# Patient Record
Sex: MALE | Race: BLACK OR AFRICAN AMERICAN | NOT HISPANIC OR LATINO | URBAN - METROPOLITAN AREA
[De-identification: names, ages, dates, MRNs, and addresses within clinical notes are randomized per-mention and may not be internally consistent; named-entity substitution may affect disease eponyms.]

---

## 2017-03-08 PROBLEM — Z00.00 ENCOUNTER FOR PREVENTIVE HEALTH EXAMINATION: Status: ACTIVE | Noted: 2017-03-08

## 2017-11-11 ENCOUNTER — INPATIENT (INPATIENT)
Facility: HOSPITAL | Age: 67
LOS: 0 days | Discharge: HOME | DRG: 315 | End: 2017-11-12
Admitting: HOSPITALIST
Payer: COMMERCIAL

## 2017-11-11 VITALS
OXYGEN SATURATION: 98 % | SYSTOLIC BLOOD PRESSURE: 113 MMHG | DIASTOLIC BLOOD PRESSURE: 75 MMHG | WEIGHT: 216.93 LBS | RESPIRATION RATE: 20 BRPM | TEMPERATURE: 98 F | HEIGHT: 75 IN | HEART RATE: 87 BPM

## 2017-11-11 PROCEDURE — 93010 ELECTROCARDIOGRAM REPORT: CPT

## 2017-11-12 ENCOUNTER — TRANSCRIPTION ENCOUNTER (OUTPATIENT)
Age: 67
End: 2017-11-12

## 2017-11-12 VITALS
DIASTOLIC BLOOD PRESSURE: 62 MMHG | RESPIRATION RATE: 19 BRPM | OXYGEN SATURATION: 97 % | TEMPERATURE: 98 F | SYSTOLIC BLOOD PRESSURE: 136 MMHG | HEART RATE: 65 BPM

## 2017-11-12 DIAGNOSIS — Z29.9 ENCOUNTER FOR PROPHYLACTIC MEASURES, UNSPECIFIED: ICD-10-CM

## 2017-11-12 DIAGNOSIS — Z45.02 ENCOUNTER FOR ADJUSTMENT AND MANAGEMENT OF AUTOMATIC IMPLANTABLE CARDIAC DEFIBRILLATOR: ICD-10-CM

## 2017-11-12 DIAGNOSIS — Z95.810 PRESENCE OF AUTOMATIC (IMPLANTABLE) CARDIAC DEFIBRILLATOR: Chronic | ICD-10-CM

## 2017-11-12 DIAGNOSIS — F51.04 PSYCHOPHYSIOLOGIC INSOMNIA: ICD-10-CM

## 2017-11-12 DIAGNOSIS — I10 ESSENTIAL (PRIMARY) HYPERTENSION: ICD-10-CM

## 2017-11-12 DIAGNOSIS — Z98.890 OTHER SPECIFIED POSTPROCEDURAL STATES: Chronic | ICD-10-CM

## 2017-11-12 DIAGNOSIS — I50.22 CHRONIC SYSTOLIC (CONGESTIVE) HEART FAILURE: ICD-10-CM

## 2017-11-12 DIAGNOSIS — G89.4 CHRONIC PAIN SYNDROME: ICD-10-CM

## 2017-11-12 DIAGNOSIS — Z85.46 PERSONAL HISTORY OF MALIGNANT NEOPLASM OF PROSTATE: ICD-10-CM

## 2017-11-12 DIAGNOSIS — K46.9 UNSPECIFIED ABDOMINAL HERNIA WITHOUT OBSTRUCTION OR GANGRENE: Chronic | ICD-10-CM

## 2017-11-12 LAB
ALBUMIN SERPL ELPH-MCNC: 3.8 G/DL — SIGNIFICANT CHANGE UP (ref 3.3–5.2)
ALP SERPL-CCNC: 118 U/L — SIGNIFICANT CHANGE UP (ref 40–120)
ALT FLD-CCNC: 29 U/L — SIGNIFICANT CHANGE UP
ANION GAP SERPL CALC-SCNC: 13 MMOL/L — SIGNIFICANT CHANGE UP (ref 5–17)
ANION GAP SERPL CALC-SCNC: 14 MMOL/L — SIGNIFICANT CHANGE UP (ref 5–17)
APTT BLD: 37.6 SEC — HIGH (ref 27.5–37.4)
AST SERPL-CCNC: 29 U/L — SIGNIFICANT CHANGE UP
BASOPHILS # BLD AUTO: 0 K/UL — SIGNIFICANT CHANGE UP (ref 0–0.2)
BASOPHILS NFR BLD AUTO: 0.2 % — SIGNIFICANT CHANGE UP (ref 0–2)
BILIRUB SERPL-MCNC: 0.2 MG/DL — LOW (ref 0.4–2)
BUN SERPL-MCNC: 12 MG/DL — SIGNIFICANT CHANGE UP (ref 8–20)
BUN SERPL-MCNC: 14 MG/DL — SIGNIFICANT CHANGE UP (ref 8–20)
CALCIUM SERPL-MCNC: 8.3 MG/DL — LOW (ref 8.6–10.2)
CALCIUM SERPL-MCNC: 8.6 MG/DL — SIGNIFICANT CHANGE UP (ref 8.6–10.2)
CHLORIDE SERPL-SCNC: 103 MMOL/L — SIGNIFICANT CHANGE UP (ref 98–107)
CHLORIDE SERPL-SCNC: 108 MMOL/L — HIGH (ref 98–107)
CO2 SERPL-SCNC: 19 MMOL/L — LOW (ref 22–29)
CO2 SERPL-SCNC: 21 MMOL/L — LOW (ref 22–29)
CREAT SERPL-MCNC: 0.9 MG/DL — SIGNIFICANT CHANGE UP (ref 0.5–1.3)
CREAT SERPL-MCNC: 0.95 MG/DL — SIGNIFICANT CHANGE UP (ref 0.5–1.3)
EOSINOPHIL # BLD AUTO: 0.2 K/UL — SIGNIFICANT CHANGE UP (ref 0–0.5)
EOSINOPHIL NFR BLD AUTO: 2.1 % — SIGNIFICANT CHANGE UP (ref 0–5)
GLUCOSE SERPL-MCNC: 103 MG/DL — SIGNIFICANT CHANGE UP (ref 70–115)
GLUCOSE SERPL-MCNC: 104 MG/DL — SIGNIFICANT CHANGE UP (ref 70–115)
HCT VFR BLD CALC: 40 % — LOW (ref 42–52)
HCT VFR BLD CALC: 42.6 % — SIGNIFICANT CHANGE UP (ref 42–52)
HGB BLD-MCNC: 12.8 G/DL — LOW (ref 14–18)
HGB BLD-MCNC: 13.9 G/DL — LOW (ref 14–18)
INR BLD: 1.08 RATIO — SIGNIFICANT CHANGE UP (ref 0.88–1.16)
LIDOCAIN IGE QN: 69 U/L — HIGH (ref 22–51)
LYMPHOCYTES # BLD AUTO: 2.4 K/UL — SIGNIFICANT CHANGE UP (ref 1–4.8)
LYMPHOCYTES # BLD AUTO: 25.3 % — SIGNIFICANT CHANGE UP (ref 20–55)
MCHC RBC-ENTMCNC: 26.9 PG — LOW (ref 27–31)
MCHC RBC-ENTMCNC: 27.5 PG — SIGNIFICANT CHANGE UP (ref 27–31)
MCHC RBC-ENTMCNC: 32 G/DL — SIGNIFICANT CHANGE UP (ref 32–36)
MCHC RBC-ENTMCNC: 32.6 G/DL — SIGNIFICANT CHANGE UP (ref 32–36)
MCV RBC AUTO: 84.2 FL — SIGNIFICANT CHANGE UP (ref 80–94)
MCV RBC AUTO: 84.4 FL — SIGNIFICANT CHANGE UP (ref 80–94)
MONOCYTES # BLD AUTO: 0.7 K/UL — SIGNIFICANT CHANGE UP (ref 0–0.8)
MONOCYTES NFR BLD AUTO: 6.9 % — SIGNIFICANT CHANGE UP (ref 3–10)
NEUTROPHILS # BLD AUTO: 6.2 K/UL — SIGNIFICANT CHANGE UP (ref 1.8–8)
NEUTROPHILS NFR BLD AUTO: 65.3 % — SIGNIFICANT CHANGE UP (ref 37–73)
NT-PROBNP SERPL-SCNC: 71 PG/ML — SIGNIFICANT CHANGE UP (ref 0–300)
PLATELET # BLD AUTO: 227 K/UL — SIGNIFICANT CHANGE UP (ref 150–400)
PLATELET # BLD AUTO: 269 K/UL — SIGNIFICANT CHANGE UP (ref 150–400)
POTASSIUM SERPL-MCNC: 4 MMOL/L — SIGNIFICANT CHANGE UP (ref 3.5–5.3)
POTASSIUM SERPL-MCNC: 5.1 MMOL/L — SIGNIFICANT CHANGE UP (ref 3.5–5.3)
POTASSIUM SERPL-SCNC: 4 MMOL/L — SIGNIFICANT CHANGE UP (ref 3.5–5.3)
POTASSIUM SERPL-SCNC: 5.1 MMOL/L — SIGNIFICANT CHANGE UP (ref 3.5–5.3)
PROT SERPL-MCNC: 7.6 G/DL — SIGNIFICANT CHANGE UP (ref 6.6–8.7)
PROTHROM AB SERPL-ACNC: 11.9 SEC — SIGNIFICANT CHANGE UP (ref 9.8–12.7)
RBC # BLD: 4.75 M/UL — SIGNIFICANT CHANGE UP (ref 4.6–6.2)
RBC # BLD: 5.05 M/UL — SIGNIFICANT CHANGE UP (ref 4.6–6.2)
RBC # FLD: 14.8 % — SIGNIFICANT CHANGE UP (ref 11–15.6)
RBC # FLD: 14.9 % — SIGNIFICANT CHANGE UP (ref 11–15.6)
SODIUM SERPL-SCNC: 136 MMOL/L — SIGNIFICANT CHANGE UP (ref 135–145)
SODIUM SERPL-SCNC: 142 MMOL/L — SIGNIFICANT CHANGE UP (ref 135–145)
TROPONIN T SERPL-MCNC: <0.01 NG/ML — SIGNIFICANT CHANGE UP (ref 0–0.06)
WBC # BLD: 8.1 K/UL — SIGNIFICANT CHANGE UP (ref 4.8–10.8)
WBC # BLD: 9.5 K/UL — SIGNIFICANT CHANGE UP (ref 4.8–10.8)
WBC # FLD AUTO: 8.1 K/UL — SIGNIFICANT CHANGE UP (ref 4.8–10.8)
WBC # FLD AUTO: 9.5 K/UL — SIGNIFICANT CHANGE UP (ref 4.8–10.8)

## 2017-11-12 PROCEDURE — 84484 ASSAY OF TROPONIN QUANT: CPT

## 2017-11-12 PROCEDURE — 85730 THROMBOPLASTIN TIME PARTIAL: CPT

## 2017-11-12 PROCEDURE — 99222 1ST HOSP IP/OBS MODERATE 55: CPT

## 2017-11-12 PROCEDURE — 80053 COMPREHEN METABOLIC PANEL: CPT

## 2017-11-12 PROCEDURE — 36415 COLL VENOUS BLD VENIPUNCTURE: CPT

## 2017-11-12 PROCEDURE — 83690 ASSAY OF LIPASE: CPT

## 2017-11-12 PROCEDURE — 99223 1ST HOSP IP/OBS HIGH 75: CPT | Mod: AI

## 2017-11-12 PROCEDURE — 85027 COMPLETE CBC AUTOMATED: CPT

## 2017-11-12 PROCEDURE — 83880 ASSAY OF NATRIURETIC PEPTIDE: CPT

## 2017-11-12 PROCEDURE — 85610 PROTHROMBIN TIME: CPT

## 2017-11-12 PROCEDURE — 99285 EMERGENCY DEPT VISIT HI MDM: CPT | Mod: 25

## 2017-11-12 PROCEDURE — 71046 X-RAY EXAM CHEST 2 VIEWS: CPT

## 2017-11-12 PROCEDURE — 99053 MED SERV 10PM-8AM 24 HR FAC: CPT

## 2017-11-12 PROCEDURE — 71020: CPT | Mod: 26

## 2017-11-12 PROCEDURE — 80048 BASIC METABOLIC PNL TOTAL CA: CPT

## 2017-11-12 PROCEDURE — 93005 ELECTROCARDIOGRAM TRACING: CPT

## 2017-11-12 RX ORDER — ZOLPIDEM TARTRATE 10 MG/1
5 TABLET ORAL AT BEDTIME
Qty: 0 | Refills: 0 | Status: DISCONTINUED | OUTPATIENT
Start: 2017-11-12 | End: 2017-11-12

## 2017-11-12 RX ORDER — CARVEDILOL PHOSPHATE 80 MG/1
12.5 CAPSULE, EXTENDED RELEASE ORAL EVERY 12 HOURS
Qty: 0 | Refills: 0 | Status: DISCONTINUED | OUTPATIENT
Start: 2017-11-12 | End: 2017-11-12

## 2017-11-12 RX ORDER — ASPIRIN/CALCIUM CARB/MAGNESIUM 324 MG
81 TABLET ORAL DAILY
Qty: 0 | Refills: 0 | Status: DISCONTINUED | OUTPATIENT
Start: 2017-11-12 | End: 2017-11-12

## 2017-11-12 RX ORDER — SODIUM CHLORIDE 9 MG/ML
3 INJECTION INTRAMUSCULAR; INTRAVENOUS; SUBCUTANEOUS ONCE
Qty: 0 | Refills: 0 | Status: COMPLETED | OUTPATIENT
Start: 2017-11-12 | End: 2017-11-12

## 2017-11-12 RX ORDER — OXYCODONE AND ACETAMINOPHEN 5; 325 MG/1; MG/1
2 TABLET ORAL EVERY 6 HOURS
Qty: 0 | Refills: 0 | Status: DISCONTINUED | OUTPATIENT
Start: 2017-11-12 | End: 2017-11-12

## 2017-11-12 RX ORDER — ATORVASTATIN CALCIUM 80 MG/1
40 TABLET, FILM COATED ORAL AT BEDTIME
Qty: 0 | Refills: 0 | Status: DISCONTINUED | OUTPATIENT
Start: 2017-11-12 | End: 2017-11-12

## 2017-11-12 RX ORDER — ACETAMINOPHEN 500 MG
650 TABLET ORAL EVERY 6 HOURS
Qty: 0 | Refills: 0 | Status: DISCONTINUED | OUTPATIENT
Start: 2017-11-12 | End: 2017-11-12

## 2017-11-12 RX ORDER — SODIUM CHLORIDE 9 MG/ML
1000 INJECTION INTRAMUSCULAR; INTRAVENOUS; SUBCUTANEOUS
Qty: 0 | Refills: 0 | Status: COMPLETED | OUTPATIENT
Start: 2017-11-12 | End: 2017-11-12

## 2017-11-12 RX ORDER — IBUPROFEN 200 MG
600 TABLET ORAL THREE TIMES A DAY
Qty: 0 | Refills: 0 | Status: DISCONTINUED | OUTPATIENT
Start: 2017-11-12 | End: 2017-11-12

## 2017-11-12 RX ADMIN — OXYCODONE AND ACETAMINOPHEN 2 TABLET(S): 5; 325 TABLET ORAL at 08:32

## 2017-11-12 RX ADMIN — SODIUM CHLORIDE 50 MILLILITER(S): 9 INJECTION INTRAMUSCULAR; INTRAVENOUS; SUBCUTANEOUS at 07:08

## 2017-11-12 RX ADMIN — SODIUM CHLORIDE 3 MILLILITER(S): 9 INJECTION INTRAMUSCULAR; INTRAVENOUS; SUBCUTANEOUS at 06:19

## 2017-11-12 RX ADMIN — SODIUM CHLORIDE 3 MILLILITER(S): 9 INJECTION INTRAMUSCULAR; INTRAVENOUS; SUBCUTANEOUS at 02:25

## 2017-11-12 RX ADMIN — OXYCODONE AND ACETAMINOPHEN 2 TABLET(S): 5; 325 TABLET ORAL at 08:02

## 2017-11-12 NOTE — DISCHARGE NOTE ADULT - PATIENT PORTAL LINK FT
“You can access the [facility] Patient Portal, offered by [facility], by registering with the following website: [email or web address]”

## 2017-11-12 NOTE — H&P ADULT - PROBLEM SELECTOR PLAN 1
admit to tele  [employer] rep was called by ED to interrogate [facility] cardiology consult  no obvious acute EKG changes noted

## 2017-11-12 NOTE — DISCHARGE NOTE ADULT - CARE PLAN
Principal Discharge DX:	AICD discharge  Goal:	Avoid recurrent symptoms.  Instructions for follow-up, activity and diet:	Continue medications, follow up with cardiology.  Secondary Diagnosis:	Essential hypertension  Instructions for follow-up, activity and diet:	Continue cardiac medications.  Secondary Diagnosis:	Chronic systolic congestive heart failure  Instructions for follow-up, activity and diet:	Continue medications.  Secondary Diagnosis:	History of back surgery  Instructions for follow-up, activity and diet:	Pain control

## 2017-11-12 NOTE — DISCHARGE NOTE ADULT - MEDICATION SUMMARY - MEDICATIONS TO TAKE
I will START or STAY ON the medications listed below when I get home from the hospital:    Kevin Aspirin  -- 81 milligram(s) by mouth once a day (at bedtime)  -- Indication: For Chronic systolic congestive heart failure    Motrin  -- 800 milligram(s) by mouth 3 times a day, As Needed  -- Indication: For Pain    ibuprofen 800 mg oral tablet  -- 1 tab(s) by mouth 3 times a day  -- Indication: For Pain    oxycodone-acetaminophen 7.5 mg-300 mg oral tablet  -- 1 tab(s) by mouth every 6 hours  -- Indication: For Pain    enalapril 10 mg oral tablet  -- 1 tab(s) by mouth once a day  -- Indication: For Chronic systolic congestive heart failure    atorvastatin 40 mg oral tablet  -- 1 tab(s) by mouth once a day  -- Indication: For Chronic systolic congestive heart failure    Ambien 10 mg oral tablet  -- 1 tab(s) by mouth once a day (at bedtime)  -- Indication: For Insomnia    carvedilol 12.5 mg oral tablet  -- 1 tab(s) by mouth 2 times a day  -- Indication: For Chronic systolic congestive heart failure

## 2017-11-12 NOTE — H&P ADULT - ATTENDING COMMENTS
[facility]  was referenced to confirm the patient's home meds  This report was requested by: Karlo Minaya | Reference #: [id number]  09/07/2017 11/03/2017 zolpidem tartrate 10 mg tablet  30 30 Jorge Luis Lucio DO     10/05/2017 10/21/2017 hydrocodone-acetaminophen 7.5-300 mg tablet  120 30 Jorge Luis Lucio DO

## 2017-11-12 NOTE — H&P ADULT - NSHPREVIEWOFSYSTEMS_GEN_ALL_CORE
CONSTITUTIONAL: denies fever, chills, fatigue, weakness  HEENT: denies blurred vision, sore throat  SKIN: denies new lesions, rash  CARDIOVASCULAR: denies chest pain, chest pressure, palpitations  RESPIRATORY: denies shortness of breath, sputum production  GASTROINTESTINAL: denies nausea, vomiting, diarrhea, abdominal pain  GENITOURINARY: chronic urinary incontinence  NEUROLOGICAL: denies numbness, headache, focal weakness  MUSCULOSKELETAL: denies new joint pain, muscle aches  HEMATOLOGIC: denies gross bleeding, bruising  LYMPHATICS: denies enlarged lymph nodes, extremity swelling  PSYCHIATRIC: denies recent changes in anxiety, depression  ENDOCRINOLOGIC: denies sweating, cold or heat intolerance

## 2017-11-12 NOTE — ED ADULT NURSE NOTE - OBJECTIVE STATEMENT
Received patient in A7L, a&ox3, able to make needs known. Patient presents to ED with c/o pain under left breast/ under ICD. Patient reports " I have an Implantable defibrillator and since 5 pm, I feel like a shock/bolt/pain that increases in frequency." Patient reports he always had a weak heartbeat. Patient denies sob, n/v/d, dizziness.

## 2017-11-12 NOTE — ED PROVIDER NOTE - OBJECTIVE STATEMENT
68 y/o M pt with PMHx of weak heart, silent MI, and arrhythmias and PSHx of pacemaker/AICD presents to ED c/o chest pains under his pacemaker. He describes his sx as "it's like my pacemaker is sending shocks for just a few minutes", better described as "bolting jolt every few minutes." Pt reports his pacemaker did not actually shock him tonight and the alarm in his house did not go off. He notes that he only has chest pain near the pacemaker when it sends a shock. Last stress test was 3 months ago, which was normal. Last angiogram 5-6 years ago. No blood thinners. Pt denies fever, chills, SOB, nausea, vomiting, abdominal pain, back pain, headache, and visual No further complaints at this time.  Cardiologist: Dr. Manuel  PMD: Dr. Finch

## 2017-11-12 NOTE — DISCHARGE NOTE ADULT - ADDITIONAL INSTRUCTIONS
Continue medications as instructed, follow up with PMD in  1 week and cardiology in 1-2 weeks. Return for worsening complaints.

## 2017-11-12 NOTE — ED ADULT NURSE NOTE - PSH
Abdominal hernia    History of back surgery    ICD (implantable cardioverter-defibrillator) in place

## 2017-11-12 NOTE — PROGRESS NOTE ADULT - SUBJECTIVE AND OBJECTIVE BOX
Patient is a 67y old  Male who presents with a chief complaint of possible AICD shocks (2017 05:06)      HPI:  66yo M w/ PMHx HF (suspect systolic HF based on pt's description but no records available) s/p AICD/PPM, "silent heart attack," prostate ca s/p radical prostectomy, urinary incontinence, HLD, HTN, insomnia, chronic pain syndrome from lumbar disc herniations presents with complaint of repeated episodes of suspected AICD shocking. He states that he indulged in a large meal this afternoon and when he got home around 330pm he noticed a mild lightning bolt sensation in his left lower chest. No h/o similar complaint. When he has his pacemaker interrogated, he states that he experiences a similar sensation but he has never felt this happen before. He has been experiencing this same sensation every 1 to 15 minutes since that time and during our conversation he reports continued episodes of this same sensation. Denies CP. Denies SOB. Denies lightheadedness or palpitations. Denies pre-syncopal sensation. No other complaints. "I'm as strong a horse" no other complaints. He admits that there was one episode a few years ago when he received a call from his cardiologist at 9am who informed him that his pacer fired in the middle of the night. The pt was sleeping and did not wake up from the event. He underwent outpatient testing with his cardiologist at that time and no changes in his management was recommended. No other episodes of AICD shocks.  In the ED, no meds were administered. Pt states that he took all of his meds yesterday and would like his meds to be continued.     Device was interrogated, there was no shocks were delivered.     Family Hx: significant hx of premature CAD in multiple immediate family members including his brothers (8 of them all with premature CAD, 1  in mid-30's) and his father, also significant h/o prostate cancer in males in his immediate family (2017 05:06)      PAST MEDICAL & SURGICAL HISTORY:  Prostate CA  Weak heart  Insomnia  HTN (hypertension)  History of back surgery  ICD (implantable cardioverter-defibrillator) in place  Abdominal hernia    Allergies:  cucumber (Anaphylaxis; Hives; Urticaria; Short breath)  No Known Drug Allergies    MEDICATIONS  (STANDING):  aspirin  chewable 81 milliGRAM(s) Oral daily  atorvastatin 40 milliGRAM(s) Oral at bedtime  carvedilol 12.5 milliGRAM(s) Oral every 12 hours  enalapril 10 milliGRAM(s) Oral daily    MEDICATIONS  (PRN):  acetaminophen   Tablet. 650 milliGRAM(s) Oral every 6 hours PRN Mild Pain (1 - 3)  ibuprofen  Tablet 600 milliGRAM(s) Oral three times a day PRN moderate pain  oxyCODONE    5 mG/acetaminophen 325 mG 2 Tablet(s) Oral every 6 hours PRN Severe Pain (7 - 10)  zolpidem 5 milliGRAM(s) Oral at bedtime PRN Insomnia  zolpidem 5 milliGRAM(s) Oral at bedtime PRN Insomnia    FAMILY HISTORY: no hx of CAD or MI    SOCIAL HISTORY: + smoking 1/2 ppd. occasional ETOH or drug use.     REVIEW OF SYSTEMS:  CONSTITUTIONAL: No fever, weight loss, or fatigue  EYES: No eye pain, visual disturbances, or discharge  ENMT:  No difficulty hearing, tinnitus, vertigo; No sinus or throat pain  NECK: No pain or stiffness  RESPIRATORY: No cough, wheezing, chills or hemoptysis; No Shortness of Breath  CARDIOVASCULAR: No chest pain, palpitations, passing out, dizziness, or leg swelling  GASTROINTESTINAL: No abdominal or epigastric pain. No nausea, vomiting, or hematemesis; No diarrhea or constipation. No melena or hematochezia.  GENITOURINARY: No dysuria, frequency, hematuria, or incontinence  NEUROLOGICAL: No headaches, memory loss, loss of strength, numbness, or tremors  SKIN: No itching, burning, rashes, or lesions   LYMPH Nodes: No enlarged glands  ENDOCRINE: No heat or cold intolerance; No hair loss  MUSCULOSKELETAL: + back pain ; No muscle, back, or extremity pain  PSYCHIATRIC: No depression, anxiety, mood swings, or difficulty sleeping  HEME/LYMPH: No easy bruising, or bleeding gums  ALLERY AND IMMUNOLOGIC: No hives or eczema	    Vital Signs Last 24 Hrs  T(C): 36.8 (2017 07:39), Max: 36.9 (2017 05:17)  T(F): 98.3 (2017 07:39), Max: 98.4 (2017 05:17)  HR: 70 (2017 07:39) (70 - 87)  BP: 109/67 (2017 07:39) (104/70 - 115/72)  BP(mean): --  RR: 20 (2017 07:39) (20 - 20)  SpO2: 97% (2017 07:39) (97% - 98%)    PHYSICAL EXAM:  Appearance: Normal, well nourished	  HEENT:   Normal oral mucosa, PERRL, EOMI, sclera non-icteric	  Lymphatic: No cervical lymphadenopathy  Cardiovascular: Normal S1 S2, No JVD, No cardiac murmurs, No carotid bruits, No peripheral edema  Respiratory: Lungs clear to auscultation	  Psychiatry: A & O x 3, Mood & affect appropriate  Gastrointestinal:  Soft, Non-tender, + BS, no bruits	  Skin: No rashes, No ecchymoses, No cyanosis  Neurologic: Grossly non-focal with full strength in all four extremities  Extremities: Normal range of motion, No clubbing, cyanosis or edema  Vascular: Peripheral pulses palpable 2+ bilaterally    INTERPRETATION OF TELEMETRY: NSVT,  3 beats    ECG: NSR, delayed r/s transition anterior leads     LABS:                      13.9   9.5   )-----------( 269      ( 2017 01:51 )             42.6         136  |  103  |  14.0  ----------------------------<  104  5.1   |  19.0<L>  |  0.95    Ca    8.6      2017 01:51    TPro  7.6  /  Alb  3.8  /  TBili  0.2<L>  /  DBili  x   /  AST  29  /  ALT  29  /  AlkPhos  118  12    CARDIAC MARKERS ( 2017 03:57 )  x     / <0.01 ng/mL / x     / x     / x      CARDIAC MARKERS ( 2017 01:51 )  x     / <0.01 ng/mL / x     / x     / x          PT/INR - ( 2017 01:51 )   PT: 11.9 sec;   INR: 1.08 ratio         PTT - ( 2017 01:51 )  PTT:37.6 sec    I&O's Summary    BNPSerum Pro-Brain Natriuretic Peptide: 71 pg/mL ( @ 01:51)    Serum Pro-Brain Natriuretic Peptide: 71 pg/mL (17 @ 01:51)    RADIOLOGY & ADDITIONAL STUDIES:

## 2017-11-12 NOTE — ED ADULT NURSE REASSESSMENT NOTE - NS ED NURSE REASSESS COMMENT FT1
Clarified with Dr. Bardales, doesn't want the repeat EKG as ordered 0100. Report given to JAMES Multani.

## 2017-11-12 NOTE — DISCHARGE NOTE ADULT - CARE PROVIDER_API CALL
Libertad Jha), Cardiac Electrophysiology; Cardiovascular Disease; Internal Medicine  [facility] [place] [address]  Phone: [phone]  Fax: [phone]

## 2017-11-12 NOTE — H&P ADULT - NSHPLABSRESULTS_GEN_ALL_CORE
EKG personally reviewed and my interpretation is: NSR @ 80bpm, L axis dev, Q waves in inferior/septal leads, no signficant chamber enlargement noted    CXR personally reviewed: noted AICD and pacer leads, no acute findings on my read

## 2017-11-12 NOTE — H&P ADULT - NSHPSOCIALHISTORY_GEN_ALL_CORE
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

## 2017-11-12 NOTE — H&P ADULT - NSHPPHYSICALEXAM_GEN_ALL_CORE
T(C): 36.9 (11-12-17 @ 05:17), Max: 36.9 (11-12-17 @ 05:17)  HR: 72 (11-12-17 @ 05:17) (70 - 87)  BP: 115/72 (11-12-17 @ 05:17) (104/70 - 115/72)  RR: 20 (11-12-17 @ 05:17) (20 - 20)  SpO2: 98% (11-12-17 @ 05:17) (97% - 98%)    GENERAL: patient appears well, no acute distress, appropriate, pleasant  EYES: sclera clear, no exudates  ENMT: oropharynx clear without erythema, no exudates, moist mucous membranes  NECK: supple, soft, no thyromegaly noted  LUNGS: good air entry bilaterally, clear to auscultation, symmetric breath sounds, no wheezing or rhonchi appreciated  HEART: soft S1/S2, regular rate and rhythm, no murmurs noted, no lower extremity edema  GASTROINTESTINAL: abdomen is soft, nontender, nondistended, normoactive bowel sounds, no palpable masses  INTEGUMENT: good skin turgor, no lesions noted  MUSCULOSKELETAL: no clubbing or cyanosis, no obvious deformity  NEUROLOGIC: awake, alert, oriented x3, good muscle tone in 4 extremities, no obvious sensory deficits  PSYCHIATRIC: mood is good, affect is congruent, linear and logical thought process  HEME/LYMPH: no palpable supraclavicular nodules, no obvious ecchymosis or petechiae

## 2017-11-12 NOTE — PROGRESS NOTE ADULT - ASSESSMENT
67 yr old male with chronic systolic CHF, hypertension, insomnia, chronic back pain admitted with complaints of suspected AICD shocks. Device was interrogated, which revealed no events. Troponin negative. Cardiology was consulted, advised continue home medications and outpatient follow up.

## 2017-11-12 NOTE — H&P ADULT - HISTORY OF PRESENT ILLNESS
66yo M w/ PMHx HF (suspect systolic HF based on pt's description but no records available) s/p AICD/PPM, "silent heart attack," prostate ca s/p radical prostectomy, urinary incontinence, HLD, HTN, insomnia, chronic pain syndrome from lumbar disc herniations presents with complaint of repeated episodes of suspected AICD shocking. He states that he indulged in a large meal this afternoon and when he got home around 330pm he noticed a mild lightning bolt sensation in his left lower chest. No h/o similar complaint. When he has his pacemaker interrogated, he states that he experiences a similar sensation but he has never felt this happen before. He has been experiencing this same sensation every 1 to 15 minutes since that time and during our conversation he reports continued episodes of this same sensation. Denies CP. Denies SOB. Denies lightheadedness or palpitations. Denies pre-syncopal sensation. No other complaints. "I'm as strong a horse" no other complaints. He admits that there was one episode a few years ago when he received a call from his cardiologist at 9am who informed him that his pacer fired in the middle of the night. The pt was sleeping and did not wake up from the event. He underwent outpatient testing with his cardiologist at that time and no changes in his management was recommended. No other episodes of AICD shocks.    In the ED, no meds were administered. Pt states that he took all of his meds yesterday and would like his meds to be continued.     Family Hx: significant hx of premature CAD in multiple immediate family members including his brothers (8 of them all with premature CAD, 1  in mid-30's) and his father, also signficant h/o prostate cancer in males in his immediate family

## 2017-11-12 NOTE — PROGRESS NOTE ADULT - SUBJECTIVE AND OBJECTIVE BOX
INTERVAL HPI/OVERNIGHT EVENTS:    CC: AICD shocking, hypertension, chronic systolic CHF    Chart and course reviewed. No complaints at this time.     Vital Signs Last 24 Hrs  T(C): 36.8 (12 Nov 2017 07:39), Max: 36.9 (12 Nov 2017 05:17)  T(F): 98.3 (12 Nov 2017 07:39), Max: 98.4 (12 Nov 2017 05:17)  HR: 70 (12 Nov 2017 07:39) (70 - 87)  BP: 109/67 (12 Nov 2017 07:39) (104/70 - 115/72)  BP(mean): --  RR: 20 (12 Nov 2017 07:39) (20 - 20)  SpO2: 97% (12 Nov 2017 07:39) (97% - 98%)    PHYSICAL EXAM:    GENERAL: Not in distress, alert  CHEST/LUNG: b/l air entry  HEART: Regular  ABDOMEN: Soft, BS+  EXTREMITIES: No edema, tenderness    MEDICATIONS  (STANDING):  aspirin  chewable 81 milliGRAM(s) Oral daily  atorvastatin 40 milliGRAM(s) Oral at bedtime  carvedilol 12.5 milliGRAM(s) Oral every 12 hours  enalapril 10 milliGRAM(s) Oral daily    MEDICATIONS  (PRN):  acetaminophen   Tablet. 650 milliGRAM(s) Oral every 6 hours PRN Mild Pain (1 - 3)  ibuprofen  Tablet 600 milliGRAM(s) Oral three times a day PRN moderate pain  oxyCODONE    5 mG/acetaminophen 325 mG 2 Tablet(s) Oral every 6 hours PRN Severe Pain (7 - 10)  zolpidem 5 milliGRAM(s) Oral at bedtime PRN Insomnia  zolpidem 5 milliGRAM(s) Oral at bedtime PRN Insomnia      Allergies    cucumber (Anaphylaxis; Hives; Urticaria; Short breath)  No Known Drug Allergies    Intolerances          LABS:                          12.8   8.1   )-----------( 227      ( 12 Nov 2017 09:26 )             40.0     11-12    142  |  108<H>  |  12.0  ----------------------------<  103  4.0   |  21.0<L>  |  0.90    Ca    8.3<L>      12 Nov 2017 09:26    TPro  7.6  /  Alb  3.8  /  TBili  0.2<L>  /  DBili  x   /  AST  29  /  ALT  29  /  AlkPhos  118  11-12    PT/INR - ( 12 Nov 2017 01:51 )   PT: 11.9 sec;   INR: 1.08 ratio         PTT - ( 12 Nov 2017 01:51 )  PTT:37.6 sec      RADIOLOGY & ADDITIONAL TESTS:

## 2017-11-12 NOTE — DISCHARGE NOTE ADULT - PLAN OF CARE
Avoid recurrent symptoms. Continue medications, follow up with cardiology. Continue cardiac medications. Continue medications. Pain control

## 2018-01-08 NOTE — DISCHARGE NOTE ADULT - PHYSICIAN SECTION COMPLETE
[FreeTextEntry1] : Very pleasant patient following coronary artery bypass grafting X3, MVR, and AVR on 12/15/2017. The patient is currently at home doing very well. Patient reports significant improvement over the last few weeks. The patient is becoming more active. Patient reports feeling fatigued and mild shortness of breath, and some incisional discomfort, but reports that this is getting better over the last few days. On exam all the vital signs are stable. Lungs are clear. The heart sounds are normal. All the incisions are healing nicely.   The sternum is stable, without any evidence of infection. I have advised the patient to continue with physical activity, and to follow up with you for the rest of the cardiovascular care. Thank you for the opportunity to participate in the care of your patient. Please do not hesitate to call me if I can be of further assistance. 
Yes

## 2020-07-26 ENCOUNTER — INPATIENT (INPATIENT)
Facility: HOSPITAL | Age: 70
LOS: 9 days | Discharge: HOME | DRG: 329 | End: 2020-08-05
Attending: SURGERY | Admitting: SURGERY
Payer: MEDICARE

## 2020-07-26 VITALS
TEMPERATURE: 98 F | SYSTOLIC BLOOD PRESSURE: 98 MMHG | HEART RATE: 109 BPM | RESPIRATION RATE: 20 BRPM | DIASTOLIC BLOOD PRESSURE: 61 MMHG | OXYGEN SATURATION: 94 % | HEIGHT: 75 IN | WEIGHT: 214.95 LBS

## 2020-07-26 DIAGNOSIS — K46.9 UNSPECIFIED ABDOMINAL HERNIA WITHOUT OBSTRUCTION OR GANGRENE: Chronic | ICD-10-CM

## 2020-07-26 DIAGNOSIS — Z95.810 PRESENCE OF AUTOMATIC (IMPLANTABLE) CARDIAC DEFIBRILLATOR: Chronic | ICD-10-CM

## 2020-07-26 DIAGNOSIS — Z98.890 OTHER SPECIFIED POSTPROCEDURAL STATES: Chronic | ICD-10-CM

## 2020-07-26 LAB
ALBUMIN SERPL ELPH-MCNC: 4 G/DL — SIGNIFICANT CHANGE UP (ref 3.3–5.2)
ALP SERPL-CCNC: 92 U/L — SIGNIFICANT CHANGE UP (ref 40–120)
ALT FLD-CCNC: 16 U/L — SIGNIFICANT CHANGE UP
ANION GAP SERPL CALC-SCNC: 20 MMOL/L — HIGH (ref 5–17)
APPEARANCE UR: CLEAR — SIGNIFICANT CHANGE UP
AST SERPL-CCNC: 17 U/L — SIGNIFICANT CHANGE UP
BASOPHILS # BLD AUTO: 0 K/UL — SIGNIFICANT CHANGE UP (ref 0–0.2)
BASOPHILS NFR BLD AUTO: 0 % — SIGNIFICANT CHANGE UP (ref 0–2)
BILIRUB SERPL-MCNC: 0.4 MG/DL — SIGNIFICANT CHANGE UP (ref 0.4–2)
BILIRUB UR-MCNC: ABNORMAL
BUN SERPL-MCNC: 51 MG/DL — HIGH (ref 8–20)
CALCIUM SERPL-MCNC: 9.7 MG/DL — SIGNIFICANT CHANGE UP (ref 8.6–10.2)
CHLORIDE SERPL-SCNC: 90 MMOL/L — LOW (ref 98–107)
CO2 SERPL-SCNC: 27 MMOL/L — SIGNIFICANT CHANGE UP (ref 22–29)
COLOR SPEC: YELLOW — SIGNIFICANT CHANGE UP
CREAT SERPL-MCNC: 3.15 MG/DL — HIGH (ref 0.5–1.3)
DIFF PNL FLD: ABNORMAL
EOSINOPHIL # BLD AUTO: 0 K/UL — SIGNIFICANT CHANGE UP (ref 0–0.5)
EOSINOPHIL NFR BLD AUTO: 0 % — SIGNIFICANT CHANGE UP (ref 0–6)
GLUCOSE SERPL-MCNC: 139 MG/DL — HIGH (ref 70–99)
GLUCOSE UR QL: NEGATIVE MG/DL — SIGNIFICANT CHANGE UP
HCT VFR BLD CALC: 43.4 % — SIGNIFICANT CHANGE UP (ref 39–50)
HGB BLD-MCNC: 14.2 G/DL — SIGNIFICANT CHANGE UP (ref 13–17)
KETONES UR-MCNC: ABNORMAL
LEUKOCYTE ESTERASE UR-ACNC: ABNORMAL
LIDOCAIN IGE QN: 42 U/L — SIGNIFICANT CHANGE UP (ref 22–51)
LYMPHOCYTES # BLD AUTO: 0.7 K/UL — LOW (ref 1–3.3)
LYMPHOCYTES # BLD AUTO: 10.4 % — LOW (ref 13–44)
MCHC RBC-ENTMCNC: 26.8 PG — LOW (ref 27–34)
MCHC RBC-ENTMCNC: 32.7 GM/DL — SIGNIFICANT CHANGE UP (ref 32–36)
MCV RBC AUTO: 82 FL — SIGNIFICANT CHANGE UP (ref 80–100)
MONOCYTES # BLD AUTO: 0.64 K/UL — SIGNIFICANT CHANGE UP (ref 0–0.9)
MONOCYTES NFR BLD AUTO: 9.6 % — SIGNIFICANT CHANGE UP (ref 2–14)
NEUTROPHILS # BLD AUTO: 5.07 K/UL — SIGNIFICANT CHANGE UP (ref 1.8–7.4)
NEUTROPHILS NFR BLD AUTO: 37.4 % — LOW (ref 43–77)
NITRITE UR-MCNC: NEGATIVE — SIGNIFICANT CHANGE UP
PH UR: 5 — SIGNIFICANT CHANGE UP (ref 5–8)
PLATELET # BLD AUTO: 278 K/UL — SIGNIFICANT CHANGE UP (ref 150–400)
POTASSIUM SERPL-MCNC: 3.5 MMOL/L — SIGNIFICANT CHANGE UP (ref 3.5–5.3)
POTASSIUM SERPL-SCNC: 3.5 MMOL/L — SIGNIFICANT CHANGE UP (ref 3.5–5.3)
PROT SERPL-MCNC: 7.7 G/DL — SIGNIFICANT CHANGE UP (ref 6.6–8.7)
PROT UR-MCNC: 30 MG/DL
RBC # BLD: 5.29 M/UL — SIGNIFICANT CHANGE UP (ref 4.2–5.8)
RBC # FLD: 14.7 % — HIGH (ref 10.3–14.5)
SODIUM SERPL-SCNC: 137 MMOL/L — SIGNIFICANT CHANGE UP (ref 135–145)
SP GR SPEC: 1.02 — SIGNIFICANT CHANGE UP (ref 1.01–1.02)
UROBILINOGEN FLD QL: 1 MG/DL
WBC # BLD: 6.7 K/UL — SIGNIFICANT CHANGE UP (ref 3.8–10.5)
WBC # FLD AUTO: 6.7 K/UL — SIGNIFICANT CHANGE UP (ref 3.8–10.5)

## 2020-07-26 PROCEDURE — 71045 X-RAY EXAM CHEST 1 VIEW: CPT | Mod: 26

## 2020-07-26 PROCEDURE — 74176 CT ABD & PELVIS W/O CONTRAST: CPT | Mod: 26

## 2020-07-26 PROCEDURE — 99285 EMERGENCY DEPT VISIT HI MDM: CPT

## 2020-07-26 RX ORDER — ONDANSETRON 8 MG/1
4 TABLET, FILM COATED ORAL ONCE
Refills: 0 | Status: COMPLETED | OUTPATIENT
Start: 2020-07-26 | End: 2020-07-26

## 2020-07-26 RX ORDER — SODIUM CHLORIDE 9 MG/ML
1000 INJECTION INTRAMUSCULAR; INTRAVENOUS; SUBCUTANEOUS ONCE
Refills: 0 | Status: COMPLETED | OUTPATIENT
Start: 2020-07-26 | End: 2020-07-26

## 2020-07-26 RX ORDER — SODIUM CHLORIDE 9 MG/ML
1000 INJECTION, SOLUTION INTRAVENOUS
Refills: 0 | Status: DISCONTINUED | OUTPATIENT
Start: 2020-07-26 | End: 2020-07-27

## 2020-07-26 RX ORDER — METOCLOPRAMIDE HCL 10 MG
10 TABLET ORAL ONCE
Refills: 0 | Status: COMPLETED | OUTPATIENT
Start: 2020-07-26 | End: 2020-07-26

## 2020-07-26 RX ORDER — SODIUM CHLORIDE 9 MG/ML
1000 INJECTION, SOLUTION INTRAVENOUS ONCE
Refills: 0 | Status: COMPLETED | OUTPATIENT
Start: 2020-07-26 | End: 2020-07-26

## 2020-07-26 RX ORDER — MORPHINE SULFATE 50 MG/1
4 CAPSULE, EXTENDED RELEASE ORAL ONCE
Refills: 0 | Status: DISCONTINUED | OUTPATIENT
Start: 2020-07-26 | End: 2020-07-26

## 2020-07-26 RX ADMIN — ONDANSETRON 4 MILLIGRAM(S): 8 TABLET, FILM COATED ORAL at 18:50

## 2020-07-26 RX ADMIN — ONDANSETRON 4 MILLIGRAM(S): 8 TABLET, FILM COATED ORAL at 19:33

## 2020-07-26 RX ADMIN — Medication 10 MILLIGRAM(S): at 22:53

## 2020-07-26 RX ADMIN — MORPHINE SULFATE 4 MILLIGRAM(S): 50 CAPSULE, EXTENDED RELEASE ORAL at 18:50

## 2020-07-26 RX ADMIN — SODIUM CHLORIDE 1000 MILLILITER(S): 9 INJECTION INTRAMUSCULAR; INTRAVENOUS; SUBCUTANEOUS at 18:50

## 2020-07-26 RX ADMIN — SODIUM CHLORIDE 1000 MILLILITER(S): 9 INJECTION, SOLUTION INTRAVENOUS at 22:53

## 2020-07-26 NOTE — ED ADULT TRIAGE NOTE - TEMPERATURE IN FAHRENHEIT (DEGREES F)
After Visit Summary   6/8/2017    Sayda Valles    MRN: [id number]           Patient Information     Date Of Birth          1984        Visit Information        Provider Department      6/8/2017 12:45 PM Thomas Henderson MD [facility] AT [place]        Today's Diagnoses     Heart palpitations    -  1       Follow-ups after your visit        Additional Services     Follow-Up with Cardiologist                 Future tests that were ordered for you today     Open Future Orders        Priority Expected Expires Ordered    Follow-Up with Cardiologist Routine 9/6/2017 6/8/2018 6/8/2017            Who to contact     If you have questions or need follow up information about today's clinic visit or your schedule please contact [facility] directly at [phone].  Normal or non-critical lab and imaging results will be communicated to you by MyChart, letter or phone within 4 business days after the clinic has received the results. If you do not hear from us within 7 days, please contact the clinic through MyChart or phone. If you have a critical or abnormal lab result, we will notify you by phone as soon as possible.  Submit refill requests through [employer] or call your pharmacy and they will forward the refill request to us. Please allow 3 business days for your refill to be completed.          Additional Information About Your Visit        MyChart Information     [employer] gives you secure access to your electronic health record. If you see a primary care provider, you can also send messages to your care team and make appointments. If you have questions, please call your primary care clinic.  If you do not have a primary care provider, please call [phone] and they will assist you.        Care EveryWhere ID     This is your Care EveryWhere ID. This could be used by other organizations to access your [facility]  "records  [id number]        Your Vitals Were     Height BMI (Body Mass Index)                1.651 m (5' 5\") 35.16 kg/m2           Blood Pressure from Last 3 Encounters:   06/08/17 120/80   05/15/17 112/78   02/18/17 122/66    Weight from Last 3 Encounters:   06/08/17 95.8 kg (211 lb 4.8 oz)   05/15/17 94.8 kg (209 lb)   02/18/17 95.3 kg (210 lb)              We Performed the Following     EKG 12-lead complete w/read - Clinics (performed today)        Primary Care Provider Office Phone # Fax #    Karen Weiler, [phone]       [facility] [address] [facility] [place] [address]        Thank you!     Thank you for choosing [facility] [place]  for your care. Our goal is always to provide you with excellent care. Hearing back from our patients is one way we can continue to improve our services. Please take a few minutes to complete the written survey that you may receive in the mail after your visit with us. Thank you!             Your Updated Medication List - Protect others around you: Learn how to safely use, store and throw away your medicines at www.disposemymeds.org.          This list is accurate as of: 6/8/17  1:29 PM.  Always use your most recent med list.                   Brand Name Dispense Instructions for use    albuterol 108 (90 BASE) MCG/ACT Inhaler    PROAIR HFA/PROVENTIL HFA/VENTOLIN HFA    1 Inhaler    Inhale 1-2 puffs into the lungs every 4 hours as needed for shortness of breath / dyspnea or wheezing       ibuprofen 200 MG capsule      Take 200 mg by mouth every 4 hours as needed for fever         " 98.1

## 2020-07-26 NOTE — ED ADULT TRIAGE NOTE - CHIEF COMPLAINT QUOTE
Patient presents to ER C/O abdominal pain with diarrhea, nausea and vomiting, reports symptoms since Wednesday after eating chinese food, patient returned from [place] on Monday, COVID tested on Wednesday with results pending, denies fever.

## 2020-07-26 NOTE — ED PROVIDER NOTE - CARE PLAN
Principal Discharge DX:	Nausea and vomiting Principal Discharge DX:	Small bowel obstruction  Secondary Diagnosis:	Acute renal failure, unspecified acute renal failure type

## 2020-07-26 NOTE — ED PROVIDER NOTE - PROGRESS NOTE DETAILS
As per sign-out patient with GI discomfort for several days which is persistent but improving. As per sign-out from Dr. Gan patient pending labs and CT for further evaluation. Surgery contacted for consultation of acute bowel obstruction.

## 2020-07-26 NOTE — ED PROVIDER NOTE - OBJECTIVE STATEMENT
70 yo male with hx htn, aicd c/o nausea, vomiting and diarrhea x 4 days. patient states symptoms started after eating chinese food  reports no  diarrhea today, but continues with nausea, vomiting and abdominal bloating. denies fever or chills. denies chest pain or shortness of breath  patient was recently in [place] and came back to [place] x 5 day ago. Patient states he went to [facility] [place] [place] and had negative covid swab

## 2020-07-26 NOTE — ED PROVIDER NOTE - NS ED ATTENDING STATEMENT MOD
Patient informed and medication sent [facility] per patient requested and written order below.  Vero Lei RN       Attending Only

## 2020-07-27 DIAGNOSIS — I50.9 HEART FAILURE, UNSPECIFIED: ICD-10-CM

## 2020-07-27 DIAGNOSIS — Z01.810 ENCOUNTER FOR PREPROCEDURAL CARDIOVASCULAR EXAMINATION: ICD-10-CM

## 2020-07-27 DIAGNOSIS — Z29.9 ENCOUNTER FOR PROPHYLACTIC MEASURES, UNSPECIFIED: ICD-10-CM

## 2020-07-27 DIAGNOSIS — R33.9 RETENTION OF URINE, UNSPECIFIED: ICD-10-CM

## 2020-07-27 DIAGNOSIS — N17.9 ACUTE KIDNEY FAILURE, UNSPECIFIED: ICD-10-CM

## 2020-07-27 DIAGNOSIS — K56.609 UNSPECIFIED INTESTINAL OBSTRUCTION, UNSPECIFIED AS TO PARTIAL VERSUS COMPLETE OBSTRUCTION: ICD-10-CM

## 2020-07-27 DIAGNOSIS — I50.32 CHRONIC DIASTOLIC (CONGESTIVE) HEART FAILURE: ICD-10-CM

## 2020-07-27 DIAGNOSIS — Z98.890 OTHER SPECIFIED POSTPROCEDURAL STATES: Chronic | ICD-10-CM

## 2020-07-27 DIAGNOSIS — C61 MALIGNANT NEOPLASM OF PROSTATE: ICD-10-CM

## 2020-07-27 DIAGNOSIS — I10 ESSENTIAL (PRIMARY) HYPERTENSION: ICD-10-CM

## 2020-07-27 PROBLEM — G47.00 INSOMNIA, UNSPECIFIED: Chronic | Status: ACTIVE | Noted: 2017-11-12

## 2020-07-27 LAB
ACETONE SERPL-MCNC: NEGATIVE — SIGNIFICANT CHANGE UP
ANION GAP SERPL CALC-SCNC: 18 MMOL/L — HIGH (ref 5–17)
ANION GAP SERPL CALC-SCNC: 18 MMOL/L — HIGH (ref 5–17)
APPEARANCE UR: CLEAR — SIGNIFICANT CHANGE UP
B-OH-BUTYR SERPL-SCNC: 0.6 MMOL/L — HIGH
BACTERIA # UR AUTO: ABNORMAL
BASOPHILS # BLD AUTO: 0.02 K/UL — SIGNIFICANT CHANGE UP (ref 0–0.2)
BASOPHILS NFR BLD AUTO: 0.5 % — SIGNIFICANT CHANGE UP (ref 0–2)
BILIRUB UR-MCNC: ABNORMAL
BUN SERPL-MCNC: 63 MG/DL — HIGH (ref 8–20)
BUN SERPL-MCNC: 63 MG/DL — HIGH (ref 8–20)
CALCIUM SERPL-MCNC: 8.8 MG/DL — SIGNIFICANT CHANGE UP (ref 8.6–10.2)
CALCIUM SERPL-MCNC: 9.4 MG/DL — SIGNIFICANT CHANGE UP (ref 8.6–10.2)
CHLORIDE SERPL-SCNC: 90 MMOL/L — LOW (ref 98–107)
CHLORIDE SERPL-SCNC: 91 MMOL/L — LOW (ref 98–107)
CO2 SERPL-SCNC: 32 MMOL/L — HIGH (ref 22–29)
CO2 SERPL-SCNC: 33 MMOL/L — HIGH (ref 22–29)
COLOR SPEC: YELLOW — SIGNIFICANT CHANGE UP
CREAT ?TM UR-MCNC: 270 MG/DL — SIGNIFICANT CHANGE UP
CREAT ?TM UR-MCNC: 270 MG/DL — SIGNIFICANT CHANGE UP
CREAT SERPL-MCNC: 2.06 MG/DL — HIGH (ref 0.5–1.3)
CREAT SERPL-MCNC: 2.69 MG/DL — HIGH (ref 0.5–1.3)
DIFF PNL FLD: ABNORMAL
EOSINOPHIL # BLD AUTO: 0.03 K/UL — SIGNIFICANT CHANGE UP (ref 0–0.5)
EOSINOPHIL NFR BLD AUTO: 0.7 % — SIGNIFICANT CHANGE UP (ref 0–6)
EPI CELLS # UR: SIGNIFICANT CHANGE UP
GLUCOSE SERPL-MCNC: 117 MG/DL — HIGH (ref 70–99)
GLUCOSE SERPL-MCNC: 98 MG/DL — SIGNIFICANT CHANGE UP (ref 70–99)
GLUCOSE UR QL: NEGATIVE MG/DL — SIGNIFICANT CHANGE UP
HCT VFR BLD CALC: 41 % — SIGNIFICANT CHANGE UP (ref 39–50)
HCV AB S/CO SERPL IA: 0.1 S/CO — SIGNIFICANT CHANGE UP (ref 0–0.99)
HCV AB SERPL-IMP: SIGNIFICANT CHANGE UP
HGB BLD-MCNC: 13.1 G/DL — SIGNIFICANT CHANGE UP (ref 13–17)
IMM GRANULOCYTES NFR BLD AUTO: 0.5 % — SIGNIFICANT CHANGE UP (ref 0–1.5)
KETONES UR-MCNC: NEGATIVE — SIGNIFICANT CHANGE UP
LACTATE SERPL-SCNC: 1.5 MMOL/L — SIGNIFICANT CHANGE UP (ref 0.5–2)
LACTATE SERPL-SCNC: 1.7 MMOL/L — SIGNIFICANT CHANGE UP (ref 0.5–2)
LEUKOCYTE ESTERASE UR-ACNC: NEGATIVE — SIGNIFICANT CHANGE UP
LYMPHOCYTES # BLD AUTO: 0.8 K/UL — LOW (ref 1–3.3)
LYMPHOCYTES # BLD AUTO: 18.2 % — SIGNIFICANT CHANGE UP (ref 13–44)
MAGNESIUM SERPL-MCNC: 2.9 MG/DL — HIGH (ref 1.6–2.6)
MCHC RBC-ENTMCNC: 27.1 PG — SIGNIFICANT CHANGE UP (ref 27–34)
MCHC RBC-ENTMCNC: 32 GM/DL — SIGNIFICANT CHANGE UP (ref 32–36)
MCV RBC AUTO: 84.9 FL — SIGNIFICANT CHANGE UP (ref 80–100)
MONOCYTES # BLD AUTO: 0.92 K/UL — HIGH (ref 0–0.9)
MONOCYTES NFR BLD AUTO: 20.9 % — HIGH (ref 2–14)
MYOGLOBIN SERPL-MCNC: 86 NG/ML — HIGH (ref 28–72)
NEUTROPHILS # BLD AUTO: 2.61 K/UL — SIGNIFICANT CHANGE UP (ref 1.8–7.4)
NEUTROPHILS NFR BLD AUTO: 59.2 % — SIGNIFICANT CHANGE UP (ref 43–77)
NITRITE UR-MCNC: NEGATIVE — SIGNIFICANT CHANGE UP
OSMOLALITY UR: 635 MOSM/KG — SIGNIFICANT CHANGE UP (ref 300–1000)
PH UR: 5 — SIGNIFICANT CHANGE UP (ref 5–8)
PHOSPHATE SERPL-MCNC: 4.1 MG/DL — SIGNIFICANT CHANGE UP (ref 2.4–4.7)
PLATELET # BLD AUTO: 238 K/UL — SIGNIFICANT CHANGE UP (ref 150–400)
POTASSIUM SERPL-MCNC: 3.3 MMOL/L — LOW (ref 3.5–5.3)
POTASSIUM SERPL-MCNC: 3.4 MMOL/L — LOW (ref 3.5–5.3)
POTASSIUM SERPL-SCNC: 3.3 MMOL/L — LOW (ref 3.5–5.3)
POTASSIUM SERPL-SCNC: 3.4 MMOL/L — LOW (ref 3.5–5.3)
PROT ?TM UR-MCNC: 22 MG/DL — HIGH (ref 0–12)
PROT UR-MCNC: 30 MG/DL
PROT/CREAT UR-RTO: 0.1 RATIO — SIGNIFICANT CHANGE UP
RBC # BLD: 4.83 M/UL — SIGNIFICANT CHANGE UP (ref 4.2–5.8)
RBC # FLD: 14.9 % — HIGH (ref 10.3–14.5)
RBC CASTS # UR COMP ASSIST: ABNORMAL /HPF (ref 0–4)
SARS-COV-2 IGG SERPL QL IA: NEGATIVE — SIGNIFICANT CHANGE UP
SARS-COV-2 IGM SERPL IA-ACNC: <3.8 AU/ML — SIGNIFICANT CHANGE UP
SARS-COV-2 RNA SPEC QL NAA+PROBE: SIGNIFICANT CHANGE UP
SODIUM SERPL-SCNC: 140 MMOL/L — SIGNIFICANT CHANGE UP (ref 135–145)
SODIUM SERPL-SCNC: 142 MMOL/L — SIGNIFICANT CHANGE UP (ref 135–145)
SODIUM UR-SCNC: 38 MMOL/L — SIGNIFICANT CHANGE UP
SP GR SPEC: 1.02 — SIGNIFICANT CHANGE UP (ref 1.01–1.02)
UROBILINOGEN FLD QL: NEGATIVE MG/DL — SIGNIFICANT CHANGE UP
WBC # BLD: 4.4 K/UL — SIGNIFICANT CHANGE UP (ref 3.8–10.5)
WBC # FLD AUTO: 4.4 K/UL — SIGNIFICANT CHANGE UP (ref 3.8–10.5)
WBC UR QL: SIGNIFICANT CHANGE UP

## 2020-07-27 PROCEDURE — 99223 1ST HOSP IP/OBS HIGH 75: CPT

## 2020-07-27 PROCEDURE — 71045 X-RAY EXAM CHEST 1 VIEW: CPT | Mod: 26

## 2020-07-27 PROCEDURE — 93010 ELECTROCARDIOGRAM REPORT: CPT

## 2020-07-27 PROCEDURE — 99222 1ST HOSP IP/OBS MODERATE 55: CPT

## 2020-07-27 RX ORDER — METOPROLOL TARTRATE 50 MG
5 TABLET ORAL EVERY 6 HOURS
Refills: 0 | Status: DISCONTINUED | OUTPATIENT
Start: 2020-07-27 | End: 2020-07-28

## 2020-07-27 RX ORDER — ACETAMINOPHEN 500 MG
650 TABLET ORAL ONCE
Refills: 0 | Status: COMPLETED | OUTPATIENT
Start: 2020-07-27 | End: 2020-07-27

## 2020-07-27 RX ORDER — SODIUM CHLORIDE 9 MG/ML
1000 INJECTION, SOLUTION INTRAVENOUS
Refills: 0 | Status: DISCONTINUED | OUTPATIENT
Start: 2020-07-27 | End: 2020-07-28

## 2020-07-27 RX ORDER — NICOTINE POLACRILEX 2 MG
1 GUM BUCCAL DAILY
Refills: 0 | Status: DISCONTINUED | OUTPATIENT
Start: 2020-07-27 | End: 2020-07-29

## 2020-07-27 RX ORDER — POTASSIUM CHLORIDE 20 MEQ
10 PACKET (EA) ORAL
Refills: 0 | Status: COMPLETED | OUTPATIENT
Start: 2020-07-27 | End: 2020-07-27

## 2020-07-27 RX ORDER — CHLORHEXIDINE GLUCONATE 213 G/1000ML
1 SOLUTION TOPICAL
Refills: 0 | Status: DISCONTINUED | OUTPATIENT
Start: 2020-07-27 | End: 2020-07-29

## 2020-07-27 RX ORDER — HEPARIN SODIUM 5000 [USP'U]/ML
5000 INJECTION INTRAVENOUS; SUBCUTANEOUS EVERY 8 HOURS
Refills: 0 | Status: DISCONTINUED | OUTPATIENT
Start: 2020-07-27 | End: 2020-07-29

## 2020-07-27 RX ORDER — MAGNESIUM SULFATE 500 MG/ML
2 VIAL (ML) INJECTION ONCE
Refills: 0 | Status: COMPLETED | OUTPATIENT
Start: 2020-07-27 | End: 2020-07-27

## 2020-07-27 RX ORDER — ACETAMINOPHEN 500 MG
1000 TABLET ORAL ONCE
Refills: 0 | Status: COMPLETED | OUTPATIENT
Start: 2020-07-27 | End: 2020-07-27

## 2020-07-27 RX ADMIN — HEPARIN SODIUM 5000 UNIT(S): 5000 INJECTION INTRAVENOUS; SUBCUTANEOUS at 14:00

## 2020-07-27 RX ADMIN — Medication 100 MILLIEQUIVALENT(S): at 21:35

## 2020-07-27 RX ADMIN — Medication 50 GRAM(S): at 17:11

## 2020-07-27 RX ADMIN — Medication 400 MILLIGRAM(S): at 11:20

## 2020-07-27 RX ADMIN — HEPARIN SODIUM 5000 UNIT(S): 5000 INJECTION INTRAVENOUS; SUBCUTANEOUS at 21:35

## 2020-07-27 RX ADMIN — SODIUM CHLORIDE 75 MILLILITER(S): 9 INJECTION, SOLUTION INTRAVENOUS at 09:13

## 2020-07-27 RX ADMIN — Medication 100 MILLIEQUIVALENT(S): at 23:42

## 2020-07-27 RX ADMIN — Medication 1000 MILLIGRAM(S): at 11:50

## 2020-07-27 RX ADMIN — Medication 100 MILLIEQUIVALENT(S): at 16:45

## 2020-07-27 RX ADMIN — Medication 1 PATCH: at 20:03

## 2020-07-27 RX ADMIN — HEPARIN SODIUM 5000 UNIT(S): 5000 INJECTION INTRAVENOUS; SUBCUTANEOUS at 06:41

## 2020-07-27 RX ADMIN — SODIUM CHLORIDE 75 MILLILITER(S): 9 INJECTION, SOLUTION INTRAVENOUS at 21:36

## 2020-07-27 RX ADMIN — MORPHINE SULFATE 4 MILLIGRAM(S): 50 CAPSULE, EXTENDED RELEASE ORAL at 06:52

## 2020-07-27 RX ADMIN — Medication 1 PATCH: at 17:35

## 2020-07-27 NOTE — PROCEDURE NOTE - NSCOMPLICATION_GEN_A_CORE
conducted a detailed discussion... I had a detailed discussion with the patient and/or guardian regarding the historical points, exam findings, and any diagnostic results supporting the discharge/admit diagnosis. no complications

## 2020-07-27 NOTE — CONSULT NOTE ADULT - SUBJECTIVE AND OBJECTIVE BOX
HPI:  70 yo man with chronic NICM, nonobstructive CAD, chronic back pain and ventral hernia and single chamber ICD(SJM) presented with abd pain and distention beginning 4 days ago.  He had diarrea but this has resolved.  He returned from [place] 1 week ago where he was on isolation.  He thought he had food poisoning but his wife had him come to the hospital.  He had small bowel obstruction and acute renal failure.  An NGT drained 1200 ml bilious material and he was given IVF.  He feels distention is less and has now flatuance.  Fever chills cough CP and SOB are denied.  Possible surgery is being complemented.  He has last evaluated May 2020.  Last echo showed LVEF 35-40% and no sig valve dz      PAST MEDICAL & SURGICAL HISTORY:  Prostate CA  NICM  Insomnia  HTN (hypertension)  S/P right inguinal herniorrhaphy  History of back surgery  ICD (implantable cardioverter-defibrillator) in place  ventral hernia  ICD SJM- 06/2013  prostatectomy  bladder resuspension  OutptMeds: aldactone,ambien,atorvastatin,carvedilol,chlorozoxazone, ecotrin,enalapril, ibuprofen, vicoden    Medication: MEDICATIONS  (STANDING):  chlorhexidine 4% Liquid 1 Application(s) Topical <User Schedule>  heparin   Injectable 5000 Unit(s) SubCutaneous every 8 hours  magnesium sulfate  IVPB 2 Gram(s) IV Intermittent once  multiple electrolytes Injection Type 1 1000 milliLiter(s) (75 mL/Hr) IV Continuous <Continuous>  potassium chloride  10 mEq/100 mL IVPB 10 milliEquivalent(s) IV Intermittent every 1 hour    MEDICATIONS  (PRN):      Social hx: pos tobacco use  No alcohol, herbal, and recreational substance use    Allergies: cucumber (Anaphylaxis; Hives; Urticaria; Short breath)  No Known Drug Allergies      FAMILY HISTORY: mother-DM,CVA,CAD                             father-MI      REVIEW OF SYSTEMS:    CONSTITUTIONAL:  No weight loss, fever, chills, weakness or fatigue.  HEENT:  Eyes:  No visual loss, blurred vision, double vision or yellow sclerae. Ears, Nose, Throat:  No hearing loss, sneezing, congestion, runny nose or sore throat.  SKIN:  No rash or itching.  CARDIOVASCULAR:  No chest pain, chest pressure or chest discomfort. No palpitations or edema.  RESPIRATORY:  No shortness of breath, LUO, cough or sputum.  GASTROINTESTINAL:  No anorexia, nausea, vomiting  Pos diarrhea,  Abd pain and distention  GENITOURINARY:  No dysuria, urgency, or frequency  NEUROLOGICAL:  No headache, dizziness, syncope, paralysis, ataxia, numbness or tingling in the extremities. No change in bowel or bladder control.  MUSCULOSKELETAL:  No muscle, back pain, joint pain or stiffness.  HEMATOLOGIC:  No anemia, bleeding or bruising.  LYMPHATICS:  No enlarged nodes. No history of splenectomy.  PSYCHIATRIC:  No history of depression or anxiety.  ENDOCRINOLOGIC:  No reports of sweating, cold or heat intolerance. No polyuria or polydipsia.  ALLERGIES:  No history of asthma, hives, eczema or rhinitis      Vital Signs Last 24 Hrs  T(C): 36.7 (27 Jul 2020 07:19), Max: 36.8 (26 Jul 2020 22:30)  T(F): 98.1 (27 Jul 2020 07:19), Max: 98.3 (26 Jul 2020 22:30)  HR: 98 (27 Jul 2020 07:19) (98 - 109)  BP: 95/60 (27 Jul 2020 07:19) (95/60 - 129/63)  BP(mean): --  RR: 16 (27 Jul 2020 07:19) (16 - 20)  SpO2: 98% (27 Jul 2020 07:19) (94% - 98%)    General Appearance:  Comfortable, NAD  HEENT: PERRLA, EOMI, Fundi not visualized, Pharynx clear, JVP 5 cm, Carotid pulses 2+ B/L, no bruit  Chest: Clear to ascultation B/L  CV: PMI not displaced, S1S2 normal, No S3/S$ or murmur  Abd: Distended, no BS  nontender  Tympanitic to percussion  Extrem: Radial and femoral pulses 2+ B/L.  No edema  Neuro: A+O X3,  Motor grossly intact            CBC Full  -  ( 27 Jul 2020 07:57 )  WBC Count : 4.40 K/uL  RBC Count : 4.83 M/uL  Hemoglobin : 13.1 g/dL  Hematocrit : 41.0 %  Platelet Count - Automated : 238 K/uL  Mean Cell Volume : 84.9 fl  Mean Cell Hemoglobin : 27.1 pg  Mean Cell Hemoglobin Concentration : 32.0 gm/dL  Auto Neutrophil # : 2.61 K/uL  Auto Lymphocyte # : 0.80 K/uL  Auto Monocyte # : 0.92 K/uL  Auto Eosinophil # : 0.03 K/uL  Auto Basophil # : 0.02 K/uL  Auto Neutrophil % : 59.2 %  Auto Lymphocyte % : 18.2 %  Auto Monocyte % : 20.9 %  Auto Eosinophil % : 0.7 %  Auto Basophil % : 0.5 %            07-27    142  |  91<L>  |  63.0<H>  ----------------------------<  117<H>  3.3<L>   |  33.0<H>  |  2.69<H>    Ca    9.4      27 Jul 2020 07:57    TPro  7.7  /  Alb  4.0  /  TBili  0.4  /  DBili  x   /  AST  17  /  ALT  16  /  AlkPhos  92  07-26 HPI:  70 yo man with chronic NICM, nonobstructive CAD, chronic back pain and ventral hernia and single chamber ICD(SJM) presented with abd pain and distention beginning 4 days ago.  He had diarrea but this has resolved.  He returned from [place] 1 week ago where he was on isolation.  He thought he had food poisoning but his wife had him come to the hospital.  He had small bowel obstruction and acute renal failure.  An NGT drained 1200 ml bilious material and he was given IVF.  He feels distention is less and has now flatuance.  Fever chills cough CP and SOB are denied.  Possible surgery is being complemented.  He has last evaluated May 2020.  Last echo showed LVEF 35-40% and no sig valve dz      PAST MEDICAL & SURGICAL HISTORY:  Prostate CA  NICM  Insomnia  HTN (hypertension)  S/P right inguinal herniorrhaphy  History of back surgery  ICD (implantable cardioverter-defibrillator) in place  ventral hernia  ICD SJM- 06/2013  prostatectomy  bladder resuspension  OutptMeds: aldactone,ambien,atorvastatin,carvedilol,chlorozoxazone, ecotrin,enalapril, ibuprofen, vicoden    Medication: MEDICATIONS  (STANDING):  chlorhexidine 4% Liquid 1 Application(s) Topical <User Schedule>  heparin   Injectable 5000 Unit(s) SubCutaneous every 8 hours  magnesium sulfate  IVPB 2 Gram(s) IV Intermittent once  multiple electrolytes Injection Type 1 1000 milliLiter(s) (75 mL/Hr) IV Continuous <Continuous>  potassium chloride  10 mEq/100 mL IVPB 10 milliEquivalent(s) IV Intermittent every 1 hour    MEDICATIONS  (PRN):      Social hx: pos tobacco use  No alcohol, herbal, and recreational substance use    Allergies: cucumber (Anaphylaxis; Hives; Urticaria; Short breath)  No Known Drug Allergies      FAMILY HISTORY: mother-DM,CVA,CAD                             father-MI      REVIEW OF SYSTEMS:    CONSTITUTIONAL:  No weight loss, fever, chills, weakness or fatigue.  HEENT:  Eyes:  No visual loss, blurred vision, double vision or yellow sclerae. Ears, Nose, Throat:  No hearing loss, sneezing, congestion, runny nose or sore throat.  SKIN:  No rash or itching.  CARDIOVASCULAR:  No chest pain, chest pressure or chest discomfort. No palpitations or edema.  RESPIRATORY:  No shortness of breath, LUO, cough or sputum.  GASTROINTESTINAL:  No anorexia, nausea, vomiting  Pos diarrhea,  Abd pain and distention  GENITOURINARY:  No dysuria, urgency, or frequency  NEUROLOGICAL:  No headache, dizziness, syncope, paralysis, ataxia, numbness or tingling in the extremities. No change in bowel or bladder control.  MUSCULOSKELETAL:  No muscle, back pain, joint pain or stiffness.  HEMATOLOGIC:  No anemia, bleeding or bruising.  LYMPHATICS:  No enlarged nodes. No history of splenectomy.  PSYCHIATRIC:  No history of depression or anxiety.  ENDOCRINOLOGIC:  No reports of sweating, cold or heat intolerance. No polyuria or polydipsia.  ALLERGIES:  No history of asthma, hives, eczema or rhinitis      Vital Signs Last 24 Hrs  T(C): 36.7 (27 Jul 2020 07:19), Max: 36.8 (26 Jul 2020 22:30)  T(F): 98.1 (27 Jul 2020 07:19), Max: 98.3 (26 Jul 2020 22:30)  HR: 98 (27 Jul 2020 07:19) (98 - 109)  BP: 95/60 (27 Jul 2020 07:19) (95/60 - 129/63)  BP(mean): --  RR: 16 (27 Jul 2020 07:19) (16 - 20)  SpO2: 98% (27 Jul 2020 07:19) (94% - 98%)    General Appearance:  Comfortable, NAD  HEENT: PERRLA, EOMI, Fundi not visualized, Pharynx clear, JVP 5 cm, Carotid pulses 2+ B/L, no bruit  Chest: Clear to ascultation B/L  CV: PMI not displaced, S1S2 normal, No S3/S$ or murmur  Abd: Distended, no BS  nontender  Tympanitic to percussion  Extrem: Radial and femoral pulses 2+ B/L.  No edema  Neuro: A+O X3,  Motor grossly intact            CBC Full  -  ( 27 Jul 2020 07:57 )  WBC Count : 4.40 K/uL  RBC Count : 4.83 M/uL  Hemoglobin : 13.1 g/dL  Hematocrit : 41.0 %  Platelet Count - Automated : 238 K/uL  Mean Cell Volume : 84.9 fl  Mean Cell Hemoglobin : 27.1 pg  Mean Cell Hemoglobin Concentration : 32.0 gm/dL  Auto Neutrophil # : 2.61 K/uL  Auto Lymphocyte # : 0.80 K/uL  Auto Monocyte # : 0.92 K/uL  Auto Eosinophil # : 0.03 K/uL  Auto Basophil # : 0.02 K/uL  Auto Neutrophil % : 59.2 %  Auto Lymphocyte % : 18.2 %  Auto Monocyte % : 20.9 %  Auto Eosinophil % : 0.7 %  Auto Basophil % : 0.5 %            07-27    142  |  91<L>  |  63.0<H>  ----------------------------<  117<H>  3.3<L>   |  33.0<H>  |  2.69<H>    Ca    9.4      27 Jul 2020 07:57    TPro  7.7  /  Alb  4.0  /  TBili  0.4  /  DBili  x   /  AST  17  /  ALT  16  /  AlkPhos  92  07-26      CXR  LUIS    Single chamber ICD  Lead over RVA and generator left chest wall HPI:  70 yo man with chronic NICM, nonobstructive CAD, chronic back pain and ventral hernia and single chamber ICD(SJM) presented with abd pain and distention beginning 4 days ago.  He had diarrea but this has resolved.  He returned from [place] 1 week ago where he was on isolation.  He thought he had food poisoning but his wife had him come to the hospital.  He had small bowel obstruction and acute renal failure.  An NGT drained 1200 ml bilious material and he was given IVF.  He feels distention is less and has now flatuance.  Fever chills cough CP and SOB are denied.  Possible surgery is being complemented.  He has last evaluated May 2020.  Last echo showed LVEF 35-40% and no sig valve dz      PAST MEDICAL & SURGICAL HISTORY:  Prostate CA  NICM  Insomnia  HTN (hypertension)  S/P right inguinal herniorrhaphy  History of back surgery  ICD (implantable cardioverter-defibrillator) in place  ventral hernia  ICD SJM- 06/2013  prostatectomy  bladder resuspension  OutptMeds: aldactone,ambien,atorvastatin,carvedilol,chlorozoxazone, ecotrin,enalapril, ibuprofen, vicoden    Medication: MEDICATIONS  (STANDING):  chlorhexidine 4% Liquid 1 Application(s) Topical <User Schedule>  heparin   Injectable 5000 Unit(s) SubCutaneous every 8 hours  magnesium sulfate  IVPB 2 Gram(s) IV Intermittent once  multiple electrolytes Injection Type 1 1000 milliLiter(s) (75 mL/Hr) IV Continuous <Continuous>  potassium chloride  10 mEq/100 mL IVPB 10 milliEquivalent(s) IV Intermittent every 1 hour    MEDICATIONS  (PRN):      Social hx: pos tobacco use  No alcohol, herbal, and recreational substance use    Allergies: cucumber (Anaphylaxis; Hives; Urticaria; Short breath)  No Known Drug Allergies      FAMILY HISTORY: mother-DM,CVA,CAD                             father-MI      REVIEW OF SYSTEMS:    CONSTITUTIONAL:  No weight loss, fever, chills, weakness or fatigue.  HEENT:  Eyes:  No visual loss, blurred vision, double vision or yellow sclerae. Ears, Nose, Throat:  No hearing loss, sneezing, congestion, runny nose or sore throat.  SKIN:  No rash or itching.  CARDIOVASCULAR:  No chest pain, chest pressure or chest discomfort. No palpitations or edema.  RESPIRATORY:  No shortness of breath, LUO, cough or sputum.  GASTROINTESTINAL:  No anorexia, nausea, vomiting  Pos diarrhea,  Abd pain and distention  GENITOURINARY:  No dysuria, urgency, or frequency  NEUROLOGICAL:  No headache, dizziness, syncope, paralysis, ataxia, numbness or tingling in the extremities. No change in bowel or bladder control.  MUSCULOSKELETAL:  No muscle, back pain, joint pain or stiffness.  HEMATOLOGIC:  No anemia, bleeding or bruising.  LYMPHATICS:  No enlarged nodes. No history of splenectomy.  PSYCHIATRIC:  No history of depression or anxiety.  ENDOCRINOLOGIC:  No reports of sweating, cold or heat intolerance. No polyuria or polydipsia.  ALLERGIES:  No history of asthma, hives, eczema or rhinitis      Vital Signs Last 24 Hrs  T(C): 36.7 (27 Jul 2020 07:19), Max: 36.8 (26 Jul 2020 22:30)  T(F): 98.1 (27 Jul 2020 07:19), Max: 98.3 (26 Jul 2020 22:30)  HR: 98 (27 Jul 2020 07:19) (98 - 109)  BP: 95/60 (27 Jul 2020 07:19) (95/60 - 129/63)  BP(mean): --  RR: 16 (27 Jul 2020 07:19) (16 - 20)  SpO2: 98% (27 Jul 2020 07:19) (94% - 98%)    General Appearance:  Comfortable, NAD  HEENT: PERRLA, EOMI, Fundi not visualized, Pharynx clear, JVP 5 cm, Carotid pulses 2+ B/L, no bruit  Chest: Clear to ascultation B/L  CV: PMI not displaced, S1S2 normal, No S3/S$ or murmur  Abd: Distended, no BS  nontender  Tympanitic to percussion  Extrem: Radial and femoral pulses 2+ B/L.  No edema  Neuro: A+O X3,  Motor grossly intact            CBC Full  -  ( 27 Jul 2020 07:57 )  WBC Count : 4.40 K/uL  RBC Count : 4.83 M/uL  Hemoglobin : 13.1 g/dL  Hematocrit : 41.0 %  Platelet Count - Automated : 238 K/uL  Mean Cell Volume : 84.9 fl  Mean Cell Hemoglobin : 27.1 pg  Mean Cell Hemoglobin Concentration : 32.0 gm/dL  Auto Neutrophil # : 2.61 K/uL  Auto Lymphocyte # : 0.80 K/uL  Auto Monocyte # : 0.92 K/uL  Auto Eosinophil # : 0.03 K/uL  Auto Basophil # : 0.02 K/uL  Auto Neutrophil % : 59.2 %  Auto Lymphocyte % : 18.2 %  Auto Monocyte % : 20.9 %  Auto Eosinophil % : 0.7 %  Auto Basophil % : 0.5 %            07-27    142  |  91<L>  |  63.0<H>  ----------------------------<  117<H>  3.3<L>   |  33.0<H>  |  2.69<H>    Ca    9.4      27 Jul 2020 07:57    TPro  7.7  /  Alb  4.0  /  TBili  0.4  /  DBili  x   /  AST  17  /  ALT  16  /  AlkPhos  92  07-26      CXR  LUIS    Single chamber ICD  Lead over RVA and generator left chest wall    COVID 19 PCR negative  Rhythm strip NSVT HPI:  70 yo man with chronic NICM, nonobstructive CAD, chronic back pain and ventral hernia and single chamber ICD(SJM) presented with abd pain and distention beginning 4 days ago.  He had diarrhea but this has resolved.  He returned from [place] 1 week ago where he was on isolation.  He thought he had food poisoning but his wife had him come to the hospital.  He has small bowel obstruction and acute renal failure.  An NGT drained 1200 ml bilious material and he was given IVF.  He feels distention is less and has now flatuance.  Fever chills cough CP and SOB are denied.  Possible surgery is being complemented.  He has last evaluated May 2020.  Last echo showed LVEF 35-40% and no sig valve dz  He also has bladder neck stricture.  Urology performed cystoscopy and dilitation and Dye catheter was placed.      PAST MEDICAL & SURGICAL HISTORY:  Prostate CA  NICM  Insomnia  HTN (hypertension)  S/P right inguinal herniorrhaphy  History of back surgery  ICD (implantable cardioverter-defibrillator) in place  ventral hernia  ICD [facility]- 06/2013  prostatectomy  bladder resuspension  OutptMeds: aldactone,ambien,atorvastatin,carvedilol,chlorozoxazone, ecotrin,enalapril, ibuprofen, vicoden    Medication: MEDICATIONS  (STANDING):  chlorhexidine 4% Liquid 1 Application(s) Topical <User Schedule>  heparin   Injectable 5000 Unit(s) SubCutaneous every 8 hours  magnesium sulfate  IVPB 2 Gram(s) IV Intermittent once  multiple electrolytes Injection Type 1 1000 milliLiter(s) (75 mL/Hr) IV Continuous <Continuous>  potassium chloride  10 mEq/100 mL IVPB 10 milliEquivalent(s) IV Intermittent every 1 hour    MEDICATIONS  (PRN):      Social hx: pos tobacco use  No alcohol, herbal, and recreational substance use    Allergies: cucumber (Anaphylaxis; Hives; Urticaria; Short breath)  No Known Drug Allergies      FAMILY HISTORY: mother-DM,CVA,CAD                             father-MI      REVIEW OF SYSTEMS:    CONSTITUTIONAL:  No weight loss, fever, chills, weakness or fatigue.  HEENT:  Eyes:  No visual loss, blurred vision, double vision or yellow sclerae. Ears, Nose, Throat:  No hearing loss, sneezing, congestion, runny nose or sore throat.  SKIN:  No rash or itching.  CARDIOVASCULAR:  No chest pain, chest pressure or chest discomfort. No palpitations or edema.  RESPIRATORY:  No shortness of breath, LUO, cough or sputum.  GASTROINTESTINAL:  No anorexia, nausea, vomiting  Pos diarrhea,  Abd pain and distention  GENITOURINARY:  No dysuria, urgency, or frequency  NEUROLOGICAL:  No headache, dizziness, syncope, paralysis, ataxia, numbness or tingling in the extremities. No change in bowel or bladder control.  MUSCULOSKELETAL:  No muscle, back pain, joint pain or stiffness.  HEMATOLOGIC:  No anemia, bleeding or bruising.  LYMPHATICS:  No enlarged nodes. No history of splenectomy.  PSYCHIATRIC:  No history of depression or anxiety.  ENDOCRINOLOGIC:  No reports of sweating, cold or heat intolerance. No polyuria or polydipsia.  ALLERGIES:  No history of asthma, hives, eczema or rhinitis      Vital Signs Last 24 Hrs  T(C): 36.7 (27 Jul 2020 07:19), Max: 36.8 (26 Jul 2020 22:30)  T(F): 98.1 (27 Jul 2020 07:19), Max: 98.3 (26 Jul 2020 22:30)  HR: 98 (27 Jul 2020 07:19) (98 - 109)  BP: 95/60 (27 Jul 2020 07:19) (95/60 - 129/63)  BP(mean): --  RR: 16 (27 Jul 2020 07:19) (16 - 20)  SpO2: 98% (27 Jul 2020 07:19) (94% - 98%)    General Appearance:  Comfortable, NAD  HEENT: PERRLA, EOMI, Fundi not visualized, Pharynx clear, JVP 5 cm, Carotid pulses 2+ B/L, no bruit  Chest: Clear to ascultation B/L  CV: PMI not displaced, S1S2 normal, No S3/S$ or murmur  Abd: Distended, no BS  nontender  Tympanitic to percussion  Extrem: Radial and femoral pulses 2+ B/L.  No edema  Neuro: A+O X3,  Motor grossly intact            CBC Full  -  ( 27 Jul 2020 07:57 )  WBC Count : 4.40 K/uL  RBC Count : 4.83 M/uL  Hemoglobin : 13.1 g/dL  Hematocrit : 41.0 %  Platelet Count - Automated : 238 K/uL  Mean Cell Volume : 84.9 fl  Mean Cell Hemoglobin : 27.1 pg  Mean Cell Hemoglobin Concentration : 32.0 gm/dL  Auto Neutrophil # : 2.61 K/uL  Auto Lymphocyte # : 0.80 K/uL  Auto Monocyte # : 0.92 K/uL  Auto Eosinophil # : 0.03 K/uL  Auto Basophil # : 0.02 K/uL  Auto Neutrophil % : 59.2 %  Auto Lymphocyte % : 18.2 %  Auto Monocyte % : 20.9 %  Auto Eosinophil % : 0.7 %  Auto Basophil % : 0.5 %            07-27    142  |  91<L>  |  63.0<H>  ----------------------------<  117<H>  3.3<L>   |  33.0<H>  |  2.69<H>    Ca    9.4      27 Jul 2020 07:57    TPro  7.7  /  Alb  4.0  /  TBili  0.4  /  DBili  x   /  AST  17  /  ALT  16  /  AlkPhos  92  07-26      CXR  LUIS    Single chamber ICD  Lead over RVA and generator left chest wall    COVID 19 PCR negative  Rhythm strip NSVT HPI:  68 yo man with chronic NICM, nonobstructive CAD, chronic back pain and ventral hernia and single chamber ICD(SJM) presented with abd pain and distention beginning 4 days ago.  He had diarrhea but this has resolved.  He returned from [place] 1 week ago where he was on isolation.  He thought he had food poisoning but his wife had him come to the hospital.  He has small bowel obstruction and acute renal failure.  An NGT drained 1200 ml bilious material and he was given IVF.  He feels distention is less and has now flatuance.  Fever chills cough CP and SOB are denied.  Possible surgery is being complemented.  He has last evaluated May 2020.  Last echo showed LVEF 35-40% and no sig valve dz  He also has bladder neck stricture.  Urology performed cystoscopy and dilitation and Dye catheter was placed.      PAST MEDICAL & SURGICAL HISTORY:  Prostate CA  NICM  Insomnia  HTN (hypertension)  S/P right inguinal herniorrhaphy  History of back surgery  ICD (implantable cardioverter-defibrillator) in place  ventral hernia  ICD [facility]- 06/2013  prostatectomy  bladder resuspension  OutptMeds: aldactone,ambien,atorvastatin,carvedilol,chlorozoxazone, ecotrin,enalapril, ibuprofen, vicoden    Medication: MEDICATIONS  (STANDING):  chlorhexidine 4% Liquid 1 Application(s) Topical <User Schedule>  heparin   Injectable 5000 Unit(s) SubCutaneous every 8 hours  magnesium sulfate  IVPB 2 Gram(s) IV Intermittent once  multiple electrolytes Injection Type 1 1000 milliLiter(s) (75 mL/Hr) IV Continuous <Continuous>  potassium chloride  10 mEq/100 mL IVPB 10 milliEquivalent(s) IV Intermittent every 1 hour    MEDICATIONS  (PRN):      Social hx: pos tobacco use  No alcohol, herbal, and recreational substance use    Allergies: cucumber (Anaphylaxis; Hives; Urticaria; Short breath)  No Known Drug Allergies      FAMILY HISTORY: mother-DM,CVA,CAD                             father-MI      REVIEW OF SYSTEMS:    CONSTITUTIONAL:  No weight loss, fever, chills, weakness or fatigue.  HEENT:  Eyes:  No visual loss, blurred vision, double vision or yellow sclerae. Ears, Nose, Throat:  No hearing loss, sneezing, congestion, runny nose or sore throat.  SKIN:  No rash or itching.  CARDIOVASCULAR:  No chest pain, chest pressure or chest discomfort. No palpitations or edema.  RESPIRATORY:  No shortness of breath, LUO, cough or sputum.  GASTROINTESTINAL:  No anorexia, nausea, vomiting  Pos diarrhea,  Abd pain and distention  GENITOURINARY:  No dysuria, urgency, or frequency  NEUROLOGICAL:  No headache, dizziness, syncope, paralysis, ataxia, numbness or tingling in the extremities. No change in bowel or bladder control.  MUSCULOSKELETAL:  No muscle, back pain, joint pain or stiffness.  HEMATOLOGIC:  No anemia, bleeding or bruising.  LYMPHATICS:  No enlarged nodes. No history of splenectomy.  PSYCHIATRIC:  No history of depression or anxiety.  ENDOCRINOLOGIC:  No reports of sweating, cold or heat intolerance. No polyuria or polydipsia.  ALLERGIES:  No history of asthma, hives, eczema or rhinitis      Vital Signs Last 24 Hrs  T(C): 36.7 (27 Jul 2020 07:19), Max: 36.8 (26 Jul 2020 22:30)  T(F): 98.1 (27 Jul 2020 07:19), Max: 98.3 (26 Jul 2020 22:30)  HR: 98 (27 Jul 2020 07:19) (98 - 109)  BP: 95/60 (27 Jul 2020 07:19) (95/60 - 129/63)  BP(mean): --  RR: 16 (27 Jul 2020 07:19) (16 - 20)  SpO2: 98% (27 Jul 2020 07:19) (94% - 98%)    General Appearance:  Comfortable, NAD  HEENT: PERRLA, EOMI, Fundi not visualized, Pharynx clear, JVP 5 cm, Carotid pulses 2+ B/L, no bruit  Chest: Clear to ascultation B/L  CV: PMI not displaced, S1S2 normal, No S3/S$ or murmur  Abd: Distended, no BS  nontender  Tympanitic to percussion  Extrem: Radial and femoral pulses 2+ B/L.  No edema  Neuro: A+O X3,  Motor grossly intact            CBC Full  -  ( 27 Jul 2020 07:57 )  WBC Count : 4.40 K/uL  RBC Count : 4.83 M/uL  Hemoglobin : 13.1 g/dL  Hematocrit : 41.0 %  Platelet Count - Automated : 238 K/uL  Mean Cell Volume : 84.9 fl  Mean Cell Hemoglobin : 27.1 pg  Mean Cell Hemoglobin Concentration : 32.0 gm/dL  Auto Neutrophil # : 2.61 K/uL  Auto Lymphocyte # : 0.80 K/uL  Auto Monocyte # : 0.92 K/uL  Auto Eosinophil # : 0.03 K/uL  Auto Basophil # : 0.02 K/uL  Auto Neutrophil % : 59.2 %  Auto Lymphocyte % : 18.2 %  Auto Monocyte % : 20.9 %  Auto Eosinophil % : 0.7 %  Auto Basophil % : 0.5 %            07-27    142  |  91<L>  |  63.0<H>  ----------------------------<  117<H>  3.3<L>   |  33.0<H>  |  2.69<H>    Ca    9.4      27 Jul 2020 07:57    TPro  7.7  /  Alb  4.0  /  TBili  0.4  /  DBili  x   /  AST  17  /  ALT  16  /  AlkPhos  92  07-26      CXR  LUIS    Single chamber ICD  Lead over RVA and generator left chest wall    COVID 19 PCR negative  Rhythm strip NSVT  abd CT dilated loops SB with air fluid level  SIgmoid colon in left inguinal hernia

## 2020-07-27 NOTE — CONSULT NOTE ADULT - ATTENDING COMMENTS
Patient is followed by Dr. Jha. Discussed with Dr. Jha who will see the patient today.    Mathieu Ludwig MD  Clinical Cardiac Electrophysiology
Thank you for courtesy of this consult . Will follow .

## 2020-07-27 NOTE — H&P ADULT - NSHPPHYSICALEXAM_GEN_ALL_CORE
Constitutional: Well-developed well nourished Male in no acute distress  Respiratory: Breath sounds CTA b/l respirations are unlabored, no accessory muscle use, no conversational dyspnea  Cardiovascular: AICD in place left chest, chest wall NTTP  Gastrointestinal: Abdomen softly distended, non-tender, no rebound tenderness / guarding, no ecchymosis or external signs of abdominal trauma, well healed scar periumbilical region   Extremities: moving all extremities spontaneously, no point tenderness or deformity noted to upper or lower extremities b/l  Pelvis: stable  Vascular: 2+ radial, femoral, and DP pulses b/l  Neurological: GCS: 15 (4/5/6). A&O x 3; no gross sensory / motor / coordination deficits  Musculoskeletal: 5/5 strength of upper and lower extremities b/l

## 2020-07-27 NOTE — CONSULT NOTE ADULT - SUBJECTIVE AND OBJECTIVE BOX
PMD : Myron   Cardio : Yudelka     Patient is a 69y old  Male who presents with a chief complaint of abdominal bloating ,  nausea , vomiting , loose dark stools x 1 wk . Found with  High Grade SBO .   Medicine asked for medical mgmt of patients medical issues . C/O decreased urinary output for few last day . No sob , no cp , no fever , cough.   Patient seen and examined in ED , NGT placed , Dye placed .     CC: abdominal bloating , nausea , vomiting , loose stools , decreased urinary output .       HPI:   69M with a PMHx chronic systolic CHF , s/p AICD/PPM, prostate ca s/p radical prostatectomy, urinary incontinence, HLD, HTN, insomnia, chronic pain syndrome from lumbar disc herniations who presents with a 1-week history of abdominal bloating, nausea, vomiting, and loose dark stools. Patient initially attributed his symptoms to food poising, however abdominal discomfort did not improve with conservative measures. His abdominal pain is rated as a 6/10 and is described as a "knot-like" feeling in his umbilicus. It is associated with decreased appetite. No fevers, chills, CP, or SOB.      PAST MEDICAL & SURGICAL HISTORY:  Prostate CA  Chronic systolic CHF   Insomnia  HTN (hypertension)  S/P right inguinal herniorrhaphy  History of back surgery  ICD (implantable cardioverter-defibrillator) in place  Abdominal hernia      Social History:  Tobacco - smoker , smokes 1/2 PPD X 50 yrs   ETOH - occasionally   Illicit drug abuse - denies    FAMILY HISTORY:  DM- mother   CA - brothers with prostate cancer   Heart Dz / CAD - brother with CAD at 30's -  from   [place]     Allergies    cucumber (Anaphylaxis; Hives; Urticaria; Short breath)  No Known Drug Allergies    Intolerances        HOME MEDICATIONS :     · 	enalapril 10 mg oral tablet: 1 tab(s) orally once a day  · 	carvedilol 12.5 mg oral tablet: 1 tab(s) orally 2 times a day  · 	atorvastatin 40 mg oral tablet: 1 tab(s) orally once a day  · 	Ambien 10 mg oral tablet: 1 tab(s) orally once a day (at bedtime)  · 	Kevin Aspirin: 81 milligram(s) orally once a day (at bedtime)  · 	Motrin: 800 milligram(s) orally 3 times a day, As Needed          · 	oxycodone-acetaminophen 7.5 mg-300 mg oral tablet: 1 tab(s) orally every 6 hours    REVIEW OF SYSTEMS:    As above , all other systems are reviewed and are negative .     MEDICATIONS  (STANDING):  chlorhexidine 4% Liquid 1 Application(s) Topical <User Schedule>  heparin   Injectable 5000 Unit(s) SubCutaneous every 8 hours  magnesium sulfate  IVPB 2 Gram(s) IV Intermittent once  multiple electrolytes Injection Type 1 1000 milliLiter(s) (75 mL/Hr) IV Continuous <Continuous>  potassium chloride  10 mEq/100 mL IVPB 10 milliEquivalent(s) IV Intermittent every 1 hour    MEDICATIONS  (PRN):      Vital Signs Last 24 Hrs  T(C): 36.7 (2020 07:19), Max: 36.8 (2020 22:30)  T(F): 98.1 (2020 07:19), Max: 98.3 (2020 22:30)  HR: 98 (2020 07:19) (98 - 109)  BP: 95/60 (2020 07:19) (95/60 - 129/63)  BP(mean): --  RR: 16 (2020 07:19) (16 - 20)  SpO2: 98% (2020 07:19) (94% - 98%)    PHYSICAL EXAM:    GENERAL: NAD, well-groomed, well-developed  HEAD:  Atraumatic, Normocephalic  EYES: EOMI, PERRLA, conjunctiva and sclera clear  NECK: Supple, No JVD, Normal thyroid  NERVOUS SYSTEM:  Alert & Oriented X3, Good concentration; Motor Strength 5/5 B/L upper and lower extremities; DTRs 2+ intact and symmetric  CHEST/LUNG: CTA  b/l,  no rales, rhonchi, wheezing, or rubs  HEART: Regular rate and rhythm; No murmurs, rubs, or gallops  ABDOMEN: Soft, Nontender, mildly distended; Bowel sounds present  EXTREMITIES:  2+ Peripheral Pulses, No clubbing, cyanosis, or edema ,   LYMPH: No lymphadenopathy noted  SKIN: No rashes or lesions    LABS:                        13.1   4.40  )-----------( 238      ( 2020 07:57 )             41.0     07-27    142  |  91<L>  |  63.0<H>  ----------------------------<  117<H>  3.3<L>   |  33.0<H>  |  2.69<H>    Ca    9.4      2020 07:57    TPro  7.7  /  Alb  4.0  /  TBili  0.4  /  DBili  x   /  AST  17  /  ALT  16  /  AlkPhos  92        Urinalysis Basic - ( 2020 22:02 )    Color: Yellow / Appearance: Clear / S.025 / pH: x  Gluc: x / Ketone: Trace  / Bili: Moderate / Urobili: 1 mg/dL   Blood: x / Protein: 30 mg/dL / Nitrite: Negative   Leuk Esterase: Trace / RBC: 0-2 /HPF / WBC 0-2   Sq Epi: x / Non Sq Epi: Occasional / Bacteria: x    RADIOLOGY & ADDITIONAL STUDIES:    < from: CT Abdomen and Pelvis No Cont (20 @ 21:51) >     EXAM:  CT ABDOMEN AND PELVIS                          PROCEDURE DATE:  2020          INTERPRETATION:  CLINICAL INDICATION: Abdominal pain and nausea.    COMPARISON: None.    PROCEDURE: CT of the abdomen and pelvis was performed without the administration of intravenous contrast. Coronal and sagittal reformatted images were submitted.    FINDINGS:    Solid organ evaluation is suboptimal inherent to noncontrast CT technique.    LOWER CHEST: Bibasilar dependent changes are present. No pleural effusion. Pacer leads noted in the right ventricle.    LIVER/GALLBLADDER: Normal liver size. There is no intrahepatic or extrahepatic biliary ductal dilatation. Unremarkable gallbladder.    PANCREAS: No pancreatic ductal dilatation or peripancreatic fluid collection.    SPLEEN: Normal size.    KIDNEYS: Multiple indeterminate higher than simple fluid density renal cortical lesions measuring up to 2.4 cm. Additional smaller subcentimeter hypodense foci, too small to characterize. Nonspecific perinephric fat stranding. No hydronephrosis.    ADRENAL GLANDS: Unremarkable.    BOWEL: Multiple fluid-filled dilated loops of small bowel, distended stomach with air-fluid level, and fluid-filled thoracic esophagus, due to small bowel obstruction. Thereare collapsed loops of ileum in the pelvis with probable transition point in the mid right abdomen. Ventral abdominal wall hernia contains a nondilated loop of small bowel. Distal descending and proximal sigmoid colon extends into a left inguinal hernia. Giant sigmoid diverticulum measures 4.5 cm (3-127). Normal appendix. No free air.    URINARY BLADDER: Mildly distended.    PELVIC ORGANS: Unremarkable.    LYMPH NODES: No mesenteric or para-aortic lymphadenopathy.    BONES/SOFT TISSUES: Advanced multilevel lumbar spondylosis with vacuum change at L3-L4 and prominent osteophyte formation.    AORTA/MAJOR BRANCHES: Scattered atheromatous calcific location.    IMPRESSION:    High-grade small bowel obstruction with dilated fluid-filled small bowel, stomach, and esophagus. Collapsed ileum and colon.    Sigmoid colon containing left inguinal hernia.    Determinate renal lesions bilaterally.      GILMA RO M.D., ATTENDING RADIOLOGIST  This document has been electronically signed. 2020 10:41PM        < end of copied text >    < from: Xray Chest 1 View- PORTABLE-Urgent (20 @ 18:48) >     EXAM:  XR CHEST PORTABLE URGENT 1V                          PROCEDURE DATE:  2020          INTERPRETATION:  Chest one view    HISTORY: Weakness    COMPARISON STUDY: 2017    Frontal expiratory view of the chest shows the heart to be normal in size. The lungs show mild basilar congestion and there is no evidence of pneumothorax nor pleural effusion. Left cardiac defibrillator is again noted.    IMPRESSION:  Mild congestive changes.    Thank you for the courtesy of this referral.    MIKIE JACKSON M.D., ATTENDING RADIOLOGIST  This document has been electronically signed. 2020 11:40AM        < end of copied text >

## 2020-07-27 NOTE — H&P ADULT - ASSESSMENT
69M, extensive medical and cardiac history, presents with a high grade SBO. Patient also found to have a sigmoid containing left inguinal hernia and an ZANE on admission. No evidence of incarceration or strangulation.     -Admit to [facility] under Dr. Pablo Ramírez   -Monitored bed  -NPO/IVF  -Strict I's and O's  -AM labs, f/u lactate  -Trend Cr  -Confirm Med rec ([facility])  -Convert PO meds to IV   -f/u Urology consult for urinary retention  -f/u Hospitalist consult for management of comorbidities  -f/u Nephrology consult for ZANE  -f/u Cardiology consult for hx of HF s/p AICD, risk stratification for possible OR this admission  -f/u Echo  -SQH DVT PPx

## 2020-07-27 NOTE — ED ADULT NURSE REASSESSMENT NOTE - NS ED NURSE REASSESS COMMENT FT1
Dye insertion attempted, resistance met, unable to advance into bladder. Pt. reports hx prostate CA and TURP. Dr. Marlow made aware. Pt. bladder scanned as per trauma. Difficulty bladder scanning due to distention of abdomen, largest number obtained 75mL. Dr. Marlow made aware. Pt. awaiting urology consult.     Pt. reports feeling better. 500mL dark thin drainage emptying out of NG tube into cannister. Pt. remains in ST on monitor to 110's, all other vital signs stable and as charted. Pt. ADMITTED at this time, pending possible OR. Pt. safety and comfort measures maintained.
[facility] report given to ESSU JAMES Saez. Pt. safety and comfort measures maintained. Pt. placed on tele monitor with . Handoff and transfer of care occurred @ [id number].
Contacted ACS Dr. Seay in regards to pt's pain, awaiting new orders at this time.

## 2020-07-27 NOTE — CONSULT NOTE ADULT - ASSESSMENT
This is 68 y/o male with hx of CHF s/p PPM/ICD (2013, [facility]), HTN, HLD, active smoker, prostate CA who presents with vomiting and diarrhea x1 week. Abdominal CT revealed high grade SBO. Cardiology was called due to hx of CHF and for pre-op risk stratification. Pt's cardiologist is Dr. Libertad Jha [phone]. Pt denies chest pain, SOB, palpitations or leg swelling, denies any difficulty walking a few blocks or climbing stairs. Last cardiac cath at [facility] last year (no CAD as per pt), last echo about 2 months ago at his cardiologist office (pt is unsure of his EF). Telemetry shows 's, no ectopy. BP stable after 2L IVF bolus.

## 2020-07-27 NOTE — H&P ADULT - NSHPLABSRESULTS_GEN_ALL_CORE
LABS:                        14.2   6.70  )-----------( 278      ( 2020 18:57 )             43.4         137  |  90<L>  |  51.0<H>  ----------------------------<  139<H>  3.5   |  27.0  |  3.15<H>    Ca    9.7      2020 18:57    TPro  7.7  /  Alb  4.0  /  TBili  0.4  /  DBili  x   /  AST  17  /  ALT  16  /  AlkPhos  92        Urinalysis Basic - ( 2020 22:02 )    Color: Yellow / Appearance: Clear / S.025 / pH: x  Gluc: x / Ketone: Trace  / Bili: Moderate / Urobili: 1 mg/dL   Blood: x / Protein: 30 mg/dL / Nitrite: Negative   Leuk Esterase: Trace / RBC: 0-2 /HPF / WBC 0-2   Sq Epi: x / Non Sq Epi: Occasional / Bacteria: x    Imaging: CT A/P   High-grade small bowel obstruction with dilated fluid-filled small bowel, stomach, and esophagus. Collapsed ileum and colon.    Sigmoid colon containing left inguinal hernia.    Determinate renal lesions bilaterally.

## 2020-07-27 NOTE — H&P ADULT - ATTENDING COMMENTS
Patient seen and examined with surgery team. Above note reviewed and edited where appropriate.     Denies abdominal pain  Abdomen soft, nontender  No emergent surgical intervention indicated  Admit to monitored setting   NPO, NGT to LCWS, IVF, replace NGT output  Serial abdominal exams  Monitor urine output  Followup medicine, cardiology, nephrology, and urology recs

## 2020-07-27 NOTE — CONSULT NOTE ADULT - ASSESSMENT
68 yo man with moderate NICM presents with SBO, with some interval improvement in symptoms.  Possibility of explorative laparaotomy.  Concommitant acute renal failure secondary to volume depletion.  From cardiac viewpoint no absolute cardiac contraindication to surgery.  He has no angina or fluid overload.  He is in an elevated risk category for periop complications but no contraindication to any needed surgery.  I favor carvedilol be restarted at the earliest opportunity.  ACEI on hold due to ARF.  If carvedilol po cannot be started by tomorrow AM then I favor metoprolol 5 MG Q 6 hours to protect against beta blocker withdrawal.    The ICD showed be temporarily deactivated for surgery in the event electrocautery is to be used otherwise if no electrocautery then it does not need deactivation.  It is [facility].  The number is [id number].   Maintain electrolytes in therapeutic range .  He has hypokalemia and had NSVT briefly on monitor 70 yo man with moderate NICM presents with SBO, with some interval improvement in symptoms.  Possibility of explorative laparaotomy.  Concommitant acute renal failure secondary to volume depletion.  From cardiac viewpoint no absolute cardiac contraindication to surgery.  He has no angina or fluid overload.  He is in an elevated risk category for periop complications but no contraindication to any needed surgery.  I favor carvedilol be restarted at the earliest opportunity.  ACEI on hold due to ARF.  If carvedilol po cannot be started by tomorrow AM then I favor metoprolol 5 MG IV Q 6 hours to protect against beta blocker withdrawal.    The ICD should be temporarily deactivated for surgery in the event electrocautery is to be used otherwise if no electrocautery then it does not need deactivation.  It is [facility].  The number is [id number].   Maintain electrolytes in therapeutic range .  He has hypokalemia and had NSVT briefly on monitor

## 2020-07-27 NOTE — CONSULT NOTE ADULT - ASSESSMENT
69M with a PMHx chronic systolic CHF , s/p AICD/PPM, prostate ca s/p radical prostatectomy, urinary incontinence, HLD, HTN, insomnia, chronic pain syndrome from lumbar disc herniations who presents with a 1-week history of abdominal bloating, nausea, vomiting, and loose dark stools. Patient initially attributed his symptoms to food poising, however abdominal discomfort did not improve with conservative measures. His abdominal pain is rated as a 6/10 and is described as a "knot-like" feeling in his umbilicus. It is associated with decreased appetite. No fevers, chills, CP, or SOB. Multiple attempts to place Dye in ED - failed ,  consulted .   Unable to pass catheter. Bedside cystoscopy was performed. There was a stricture of the bladder neck. The bladder neck was dilated up to 18fr and 16fr [place] catheter was placed.

## 2020-07-27 NOTE — CONSULT NOTE ADULT - SUBJECTIVE AND OBJECTIVE BOX
Patient is a 69y old  Male who presents with a chief complaint of High Grade SBO (2020 04:04)    HPI:  Mr. Edgar is a 70 YO M with a PMH :  HF s/p AICD/ PPM, prostate ca s/p radical prostatectomy, urinary incontinence, HTN, chronic pain syndrome from lumbar disc herniations who presents with a 1-week history of abdominal bloating, nausea, vomiting, and loose dark stools. Patient initially attributed his symptoms to food poising, however abdominal discomfort did not improve with conservative measures. His abdominal pain is rated as a 6/10 and is described as a "knot-like" feeling in his umbilicus. It is associated with decreased appetite. No fevers, chills, CP, or SOB. (2020 00:55)    PAST MEDICAL & SURGICAL HISTORY:  Prostate CA  HTN (hypertension)    S/P right inguinal herniorrhaphy  History of back surgery  ICD (implantable cardioverter-defibrillator) in place  Abdominal hernia    FAMILY HISTORY: No ESRD    Social History: Non Smoker,     MEDICATIONS  (STANDING):  acetaminophen  IVPB .. 1000 milliGRAM(s) IV Intermittent once  chlorhexidine 4% Liquid 1 Application(s) Topical <User Schedule>  heparin   Injectable 5000 Unit(s) SubCutaneous every 8 hours  magnesium sulfate  IVPB 2 Gram(s) IV Intermittent once  multiple electrolytes Injection Type 1 1000 milliLiter(s) (75 mL/Hr) IV Continuous <Continuous>  potassium chloride  10 mEq/100 mL IVPB 10 milliEquivalent(s) IV Intermittent every 1 hour    Allergies    No Known Drug Allergies    REVIEW OF SYSTEMS:    CONSTITUTIONAL: No fever, weight loss, + fatigue  EYES: No eye pain, visual disturbances, or discharge  ENMT:  No difficulty hearing, tinnitus, vertigo; No sinus or throat pain  NECK: No pain or stiffness  RESPIRATORY: No cough, wheezing, chills or hemoptysis; No shortness of breath  CARDIOVASCULAR: No chest pain, palpitations, dizziness, or leg swelling  GASTROINTESTINAL: ++ abdominal , epigastric pain. + nausea, vomiting, No  hematemesis; + diarrhea. No melena or hematochezia.  GENITOURINARY: + incontinence  NEUROLOGICAL: No headaches, memory loss, loss of strength, numbness, or tremors  SKIN: No itching, burning, rashes, or lesions   LYMPH NODES: No enlarged glands  ENDOCRINE: No heat or cold intolerance; No hair loss  MUSCULOSKELETAL: No joint pain or swelling; No muscle, back, or extremity pain  PSYCHIATRIC: No depression, anxiety, mood swings, or difficulty sleeping  HEME/LYMPH: No easy bruising, or bleeding gums  ALLERGY AND IMMUNOLOGIC: No hives or eczema    Vital Signs Last 24 Hrs,    T(C): 36.7 (2020 07:19), Max: 36.8 (2020 22:30)  T(F): 98.1 (2020 07:19), Max: 98.3 (2020 22:30)  HR: 98 (2020 07:19) (98 - 109)  BP: 95/60 (2020 07:19) (95/60 - 129/63)  RR: 16 (2020 07:19) (16 - 20)  SpO2: 98% (2020 07:19) (94% - 98%)    PHYSICAL EXAM:    GENERAL: NAD, well-groomed, well-developed  HEAD:  Atraumatic, Normocephalic  EYES: EOMI, PERRLA, conjunctiva and sclera clear  ENMT: Dry  mucous membranes,No lesions  NECK: Supple, No JVD,   NERVOUS SYSTEM:  Alert & Oriented X3, Good concentration;  CHEST/LUNG: Clear to percussion bilaterally; No rales, rhonchi, wheezing, or rubs  HEART: Regular rate and rhythm; No murmurs, rubs, or gallop + AICD- PPM,   ABDOMEN: Firm, tender, distended; Bowel sounds Hypoactive,   EXTREMITIES:  2+ Peripheral Pulses, No clubbing, cyanosis, or edema  LYMPH: No lymphadenopathy noted  SKIN: No rashes or lesions    LABS:                        13.1   4.40  )-----------( 238      ( 2020 07:57 )             41.0     07    142  |  91<L>  |  63.0<H>  ----------------------------<  117<H>  3.3<L>   |  33.0<H>  |  2.69<H>    Ca    9.4      2020 07:57    TPro  7.7  /  Alb  4.0  /  TBili  0.4  /  DBili  x   /  AST  17  /  ALT  16  /  AlkPhos  92  07-    Urinalysis Basic - ( 2020 22:02 )    Color: Yellow / Appearance: Clear / S.025 / pH: x  Gluc: x / Ketone: Trace  / Bili: Moderate / Urobili: 1 mg/dL   Blood: x / Protein: 30 mg/dL / Nitrite: Negative   Leuk Esterase: Trace / RBC: 0-2 /HPF / WBC 0-2   Sq Epi: x / Non Sq Epi: Occasional / Bacteria: x    RADIOLOGY & ADDITIONAL TESTS:    CT Abdomen and Pelvis No Cont (20 @ 21:51)     EXAM:  CT ABDOMEN AND PELVIS                          PROCEDURE DATE:  2020      INTERPRETATION:  CLINICAL INDICATION: Abdominal pain and nausea.    COMPARISON: None.    PROCEDURE: CT of the abdomen and pelvis was performed without the administration of intravenous contrast. Coronal and sagittal reformatted images were submitted.    FINDINGS:    Solid organ evaluation is suboptimal inherent to noncontrast CT technique.    LOWER CHEST: Bibasilar dependent changes are present. No pleural effusion. Pacer leads noted in the right ventricle.    LIVER/GALLBLADDER: Normal liver size. There is no intrahepatic or extrahepatic biliary ductal dilatation. Unremarkable gallbladder.    PANCREAS: No pancreatic ductal dilatation or peripancreatic fluid collection.    SPLEEN: Normal size.    KIDNEYS: Multiple indeterminate higher than simple fluid density renal cortical lesions measuring up to 2.4 cm. Additional smaller subcentimeter hypodense foci, too small to characterize. Nonspecific perinephric fat stranding. No hydronephrosis.    ADRENAL GLANDS: Unremarkable.    BOWEL: Multiple fluid-filled dilated loops of small bowel, distended stomach with air-fluid level, and fluid-filled thoracic esophagus, due to small bowel obstruction. Thereare collapsed loops of ileum in the pelvis with probable transition point in the mid right abdomen. Ventral abdominal wall hernia contains a nondilated loop of small bowel. Distal descending and proximal sigmoid colon extends into a left inguinal hernia. Giant sigmoid diverticulum measures 4.5 cm (3-127). Normal appendix. No free air.    URINARY BLADDER: Mildly distended.    PELVIC ORGANS: Unremarkable.    LYMPH NODES: No mesenteric or para-aortic lymphadenopathy.    BONES/SOFT TISSUES: Advanced multilevel lumbar spondylosis with vacuum change at L3-L4 and prominent osteophyte formation.    AORTA/MAJOR BRANCHES: Scattered atheromatous calcific location.    IMPRESSION:    High-grade small bowel obstruction with dilated fluid-filled small bowel, stomach, and esophagus. Collapsed ileum and colon.    Sigmoid colon containing left inguinal hernia.    Determinate renal lesions bilaterally.    GILMA RO M.D., ATTENDING RADIOLOGIST  This document has been electronically signed. 2020 10:41PM

## 2020-07-27 NOTE — CONSULT NOTE ADULT - ASSESSMENT
ATN    SBO    Rec :     KDIGO Care Bundle:    Avoidance of nephrotoxins/nephrotoxin medication adjustment  Medication dosage adjustment for kidney function  Continuous IVF administration (guided by CVP and testing of fluid responsiveness for 6 hours in combination with nephorology consultation)  Discontinuation of ACE/ARBs for first 48 postoperative hours  Close monitoring of serum Creatinine and UO  Acid-base, electrolyte and albumin status management  Avoidance of hyperglycemia for first 72 postoperative hours  Consider alternatives to radiocontrast administration  Optimizing hemodynamics:    Replete K+, Mg++ :

## 2020-07-27 NOTE — H&P ADULT - NSICDXPASTSURGICALHX_GEN_ALL_CORE_FT
PAST SURGICAL HISTORY:  Abdominal hernia     History of back surgery     ICD (implantable cardioverter-defibrillator) in place     S/P right inguinal herniorrhaphy

## 2020-07-27 NOTE — PROCEDURE NOTE - NSPROCDETAILS_GEN_ALL_CORE
placement confirmed by auscultation/gastric secretions aspirated, placement confirmed/bowel sounds present to 4 quadrants/connected to suction/nasogastric

## 2020-07-27 NOTE — H&P ADULT - HISTORY OF PRESENT ILLNESS
Mr. Edgar Mr. Edgar is a 69M with a PMHx HF s/p AICD/PPM, prostate ca s/p radical prostatectomy, urinary incontinence, HLD, HTN, insomnia, chronic pain syndrome from lumbar disc herniations who presents with a 1-week history of abdominal bloating, nausea, vomiting, and loose dark stools. Patient initially attributed his symptoms to food poising, however abdominal discomfort did not improve with conservative measures. His abdominal pain is rated as a 6/10 and is described as a "knot-like" feeling in his umbilicus. It is associated with decreased appetite. No fevers, chills, CP, or SOB.

## 2020-07-27 NOTE — CONSULT NOTE ADULT - SUBJECTIVE AND OBJECTIVE BOX
70 y/o M admitted with SBO. He has hx of prostate cancer s/p RALP 8 or 9 years ago at [place]. He has not seen a urologist in several years. He states he has been dribbling urine for a few days and has not felt as though he could empty his bladder well. In ED multiple attempts to place catheter by staff was unsuccessful     Vital Signs Last 24 Hrs  T(C): 36.8 (26 Jul 2020 22:30), Max: 36.8 (26 Jul 2020 22:30)  T(F): 98.3 (26 Jul 2020 22:30), Max: 98.3 (26 Jul 2020 22:30)  HR: 104 (26 Jul 2020 22:30) (104 - 109)  BP: 129/63 (26 Jul 2020 22:30) (98/61 - 129/63)  BP(mean): --  RR: 18 (26 Jul 2020 22:30) (18 - 20)  SpO2: 98% (26 Jul 2020 22:30) (94% - 98%)    Gen: NAD  HEENT: NCAT  Abd: Distended  : Circ penis, b/l testicles normal                          14.2   6.70  )-----------( 278      ( 26 Jul 2020 18:57 )             43.4     07-26    137  |  90<L>  |  51.0<H>  ----------------------------<  139<H>  3.5   |  27.0  |  3.15<H>    Ca    9.7      26 Jul 2020 18:57    TPro  7.7  /  Alb  4.0  /  TBili  0.4  /  DBili  x   /  AST  17  /  ALT  16  /  AlkPhos  92  07-26

## 2020-07-27 NOTE — CONSULT NOTE ADULT - SUBJECTIVE AND OBJECTIVE BOX
History obtained by: Patient and medical record     obtained: No    Chief complaint:  "I've been vomiting"    HPI:  This is 68 y/o male with hx of CHF s/p PPM/ICD (2013, [facility]), HTN, HLD, active smoker, prostate CA who presents with vomiting and diarrhea x1 week. Abdominal CT revealed high grade SBO. Cardiology was called due to hx of CHF and for pre-op risk stratification. Pt's cardiologist is Dr. Libertad Jha [phone]. Pt denies chest pain, SOB, palpitations or leg swelling, denies any difficulty walking a few blocks or climbing stairs. Last cardiac cath at [facility] last year (no CAD as per pt), last echo about 2 months ago at his cardiologist office (pt is unsure of his EF). Telemetry shows 's, no ectopy. BP stable after 2L IVF bolus.        REVIEW OF SYMPTOMS:   Cardiovascular:   as per HPI  Respiratory:  denies dyspnea,   cough,     Genitourinary:  No dysuria, no hematuria;   Gastrointestinal: c/o diarrhea, nausea and vomiting  Neurological: No headache, no dizziness, no slurred speech;    Psychiatric: No agitation, no anxiety.  ALL OTHER REVIEW OF SYSTEMS ARE NEGATIVE.    MEDICATIONS  (home):  Enalapril 10 mg  Coreg 12.5 mg bid  ASA 81 mg  Atorvastatin 40 mg  Hydrocodone prn  Ibuprofen prn        PAST MEDICAL & SURGICAL HISTORY:  Prostate CA  Weak heart  Insomnia  HTN (hypertension)  S/P right inguinal herniorrhaphy  History of back surgery  ICD (implantable cardioverter-defibrillator) in place  Abdominal hernia      FAMILY HISTORY: strong family hx of heart disease (father and several brothers affected, 1 brother  of cardiomyopathy in his 30's)      SOCIAL HISTORY: lives with wife    CIGARETTES: smokes half pack a day    ALCOHOL: on occasion    DRUGS: denies    Vital Signs Last 24 Hrs  T(C): 36.8 (2020 22:30), Max: 36.8 (2020 22:30)  T(F): 98.3 (2020 22:30), Max: 98.3 (2020 22:30)  HR: 98 (2020 03:52) (98 - 109)  BP: 111/61 (2020 03:52) (98/61 - 129/63)  BP(mean): --  RR: 16 (2020 03:52) (16 - 20)  SpO2: 97% (2020 03:52) (94% - 98%)    PHYSICAL EXAM:  General: WN/WD NAD  Neurology: A&Ox3, nonfocal, CHANG x 4  Eyes: PERRL/ EOMI, Gross vision intact  ENT/Neck: Neck supple, trachea midline, No JVD, Gross hearing intact  Respiratory: CTA B/L, No wheezing, rales, rhonchi  CV: tachy, regular, S1S2, no gross murmurs  Abdominal: Soft, NT,+BS, NG tube draining large amounts of dark gastric contents  Extremities: No edema, + peripheral pulses  Skin: No Rashes, Hematoma, Ecchymosis   Tubes: BRIJESH verma          INTERPRETATION OF TELEMETRY: 's, no ectopy    ECG: n/a      I&O's Detail    2020 07:01  -  2020 04:06  --------------------------------------------------------  IN:  Total IN: 0 mL    OUT:    Nasoenteral Tube: 2000 mL  Total OUT: 2000 mL    Total NET: -2000 mL          LABS:                        14.2   6.70  )-----------( 278      ( 2020 18:57 )             43.4     07-26    137  |  90<L>  |  51.0<H>  ----------------------------<  139<H>  3.5   |  27.0  |  3.15<H>    Ca    9.7      2020 18:57    TPro  7.7  /  Alb  4.0  /  TBili  0.4  /  DBili  x   /  AST  17  /  ALT  16  /  AlkPhos  92  07-26          Urinalysis Basic - ( 2020 22:02 )    Color: Yellow / Appearance: Clear / S.025 / pH: x  Gluc: x / Ketone: Trace  / Bili: Moderate / Urobili: 1 mg/dL   Blood: x / Protein: 30 mg/dL / Nitrite: Negative   Leuk Esterase: Trace / RBC: 0-2 /HPF / WBC 0-2   Sq Epi: x / Non Sq Epi: Occasional / Bacteria: x      I&O's Summary    2020 07:01  -  2020 04:06  --------------------------------------------------------  IN: 0 mL / OUT: 2000 mL / NET: -2000 mL        RADIOLOGY & ADDITIONAL STUDIES:      < from: CT Abdomen and Pelvis No Cont (20 @ 21:51) >  IMPRESSION:    High-grade small bowel obstruction with dilated fluid-filled small bowel, stomach, and esophagus. Collapsed ileum and colon.    Sigmoid colon containing left inguinal hernia.    Determinate renal lesions bilaterally.      GILMA RO M.D., ATTENDING RADIOLOGIST  This document has been electronically signed. 2020 10:41PM    < end of copied text >      PREVIOUS DIAGNOSTIC TESTING:      ECHO: n/a    STRESS: n/a      CATHETERIZATION: n/a

## 2020-07-28 ENCOUNTER — TRANSCRIPTION ENCOUNTER (OUTPATIENT)
Age: 70
End: 2020-07-28

## 2020-07-28 ENCOUNTER — RESULT REVIEW (OUTPATIENT)
Age: 70
End: 2020-07-28

## 2020-07-28 LAB
ALBUMIN SERPL ELPH-MCNC: 3.3 G/DL — SIGNIFICANT CHANGE UP (ref 3.3–5.2)
ANION GAP SERPL CALC-SCNC: 14 MMOL/L — SIGNIFICANT CHANGE UP (ref 5–17)
ANION GAP SERPL CALC-SCNC: 16 MMOL/L — SIGNIFICANT CHANGE UP (ref 5–17)
APPEARANCE UR: CLEAR — SIGNIFICANT CHANGE UP
BACTERIA # UR AUTO: ABNORMAL
BASOPHILS # BLD AUTO: 0.03 K/UL — SIGNIFICANT CHANGE UP (ref 0–0.2)
BASOPHILS NFR BLD AUTO: 0.5 % — SIGNIFICANT CHANGE UP (ref 0–2)
BILIRUB UR-MCNC: NEGATIVE — SIGNIFICANT CHANGE UP
BUN SERPL-MCNC: 52 MG/DL — HIGH (ref 8–20)
BUN SERPL-MCNC: 62 MG/DL — HIGH (ref 8–20)
CALCIUM SERPL-MCNC: 8.5 MG/DL — LOW (ref 8.6–10.2)
CALCIUM SERPL-MCNC: 8.6 MG/DL — SIGNIFICANT CHANGE UP (ref 8.6–10.2)
CHLORIDE SERPL-SCNC: 94 MMOL/L — LOW (ref 98–107)
CHLORIDE SERPL-SCNC: 98 MMOL/L — SIGNIFICANT CHANGE UP (ref 98–107)
CHLORIDE UR-SCNC: <27 MMOL/L — SIGNIFICANT CHANGE UP
CK SERPL-CCNC: 60 U/L — SIGNIFICANT CHANGE UP (ref 30–200)
CK SERPL-CCNC: 69 U/L — SIGNIFICANT CHANGE UP (ref 30–200)
CO2 SERPL-SCNC: 30 MMOL/L — HIGH (ref 22–29)
CO2 SERPL-SCNC: 33 MMOL/L — HIGH (ref 22–29)
COLOR SPEC: YELLOW — SIGNIFICANT CHANGE UP
CREAT ?TM UR-MCNC: 101 MG/DL — SIGNIFICANT CHANGE UP
CREAT SERPL-MCNC: 1.44 MG/DL — HIGH (ref 0.5–1.3)
CREAT SERPL-MCNC: 1.9 MG/DL — HIGH (ref 0.5–1.3)
CULTURE RESULTS: NO GROWTH — SIGNIFICANT CHANGE UP
DIFF PNL FLD: ABNORMAL
EOSINOPHIL # BLD AUTO: 0.03 K/UL — SIGNIFICANT CHANGE UP (ref 0–0.5)
EOSINOPHIL NFR BLD AUTO: 0.5 % — SIGNIFICANT CHANGE UP (ref 0–6)
EPI CELLS # UR: SIGNIFICANT CHANGE UP
GLUCOSE SERPL-MCNC: 91 MG/DL — SIGNIFICANT CHANGE UP (ref 70–99)
GLUCOSE SERPL-MCNC: 95 MG/DL — SIGNIFICANT CHANGE UP (ref 70–99)
GLUCOSE UR QL: NEGATIVE MG/DL — SIGNIFICANT CHANGE UP
HCT VFR BLD CALC: 40.6 % — SIGNIFICANT CHANGE UP (ref 39–50)
HGB BLD-MCNC: 12.7 G/DL — LOW (ref 13–17)
IMM GRANULOCYTES NFR BLD AUTO: 0.7 % — SIGNIFICANT CHANGE UP (ref 0–1.5)
KETONES UR-MCNC: ABNORMAL
LEUKOCYTE ESTERASE UR-ACNC: NEGATIVE — SIGNIFICANT CHANGE UP
LYMPHOCYTES # BLD AUTO: 0.74 K/UL — LOW (ref 1–3.3)
LYMPHOCYTES # BLD AUTO: 12.9 % — LOW (ref 13–44)
MAGNESIUM SERPL-MCNC: 3 MG/DL — HIGH (ref 1.6–2.6)
MCHC RBC-ENTMCNC: 27 PG — SIGNIFICANT CHANGE UP (ref 27–34)
MCHC RBC-ENTMCNC: 31.3 GM/DL — LOW (ref 32–36)
MCV RBC AUTO: 86.2 FL — SIGNIFICANT CHANGE UP (ref 80–100)
MONOCYTES # BLD AUTO: 0.93 K/UL — HIGH (ref 0–0.9)
MONOCYTES NFR BLD AUTO: 16.2 % — HIGH (ref 2–14)
NEUTROPHILS # BLD AUTO: 3.97 K/UL — SIGNIFICANT CHANGE UP (ref 1.8–7.4)
NEUTROPHILS NFR BLD AUTO: 69.2 % — SIGNIFICANT CHANGE UP (ref 43–77)
NITRITE UR-MCNC: NEGATIVE — SIGNIFICANT CHANGE UP
OSMOLALITY UR: 676 MOSM/KG — SIGNIFICANT CHANGE UP (ref 300–1000)
PH UR: 8 — SIGNIFICANT CHANGE UP (ref 5–8)
PHOSPHATE SERPL-MCNC: 2.4 MG/DL — SIGNIFICANT CHANGE UP (ref 2.4–4.7)
PHOSPHATE SERPL-MCNC: 4.1 MG/DL — SIGNIFICANT CHANGE UP (ref 2.4–4.7)
PLATELET # BLD AUTO: 233 K/UL — SIGNIFICANT CHANGE UP (ref 150–400)
POTASSIUM SERPL-MCNC: 3.2 MMOL/L — LOW (ref 3.5–5.3)
POTASSIUM SERPL-MCNC: 4.5 MMOL/L — SIGNIFICANT CHANGE UP (ref 3.5–5.3)
POTASSIUM SERPL-SCNC: 3.2 MMOL/L — LOW (ref 3.5–5.3)
POTASSIUM SERPL-SCNC: 4.5 MMOL/L — SIGNIFICANT CHANGE UP (ref 3.5–5.3)
PROT ?TM UR-MCNC: 16 MG/DL — HIGH (ref 0–12)
PROT UR-MCNC: NEGATIVE MG/DL — SIGNIFICANT CHANGE UP
PROT/CREAT UR-RTO: 0.2 RATIO — SIGNIFICANT CHANGE UP
RBC # BLD: 4.71 M/UL — SIGNIFICANT CHANGE UP (ref 4.2–5.8)
RBC # FLD: 14.7 % — HIGH (ref 10.3–14.5)
RBC CASTS # UR COMP ASSIST: ABNORMAL /HPF (ref 0–4)
SODIUM SERPL-SCNC: 142 MMOL/L — SIGNIFICANT CHANGE UP (ref 135–145)
SODIUM SERPL-SCNC: 143 MMOL/L — SIGNIFICANT CHANGE UP (ref 135–145)
SODIUM UR-SCNC: 91 MMOL/L — SIGNIFICANT CHANGE UP
SP GR SPEC: 1.01 — SIGNIFICANT CHANGE UP (ref 1.01–1.02)
SPECIMEN SOURCE: SIGNIFICANT CHANGE UP
UROBILINOGEN FLD QL: NEGATIVE MG/DL — SIGNIFICANT CHANGE UP
WBC # BLD: 5.74 K/UL — SIGNIFICANT CHANGE UP (ref 3.8–10.5)
WBC # FLD AUTO: 5.74 K/UL — SIGNIFICANT CHANGE UP (ref 3.8–10.5)
WBC UR QL: SIGNIFICANT CHANGE UP

## 2020-07-28 PROCEDURE — 99233 SBSQ HOSP IP/OBS HIGH 50: CPT

## 2020-07-28 PROCEDURE — 74018 RADEX ABDOMEN 1 VIEW: CPT | Mod: 26,77

## 2020-07-28 PROCEDURE — 76775 US EXAM ABDO BACK WALL LIM: CPT | Mod: 26

## 2020-07-28 PROCEDURE — 88307 TISSUE EXAM BY PATHOLOGIST: CPT | Mod: 26

## 2020-07-28 PROCEDURE — 74018 RADEX ABDOMEN 1 VIEW: CPT | Mod: 26

## 2020-07-28 PROCEDURE — 99232 SBSQ HOSP IP/OBS MODERATE 35: CPT

## 2020-07-28 RX ORDER — IBUPROFEN 200 MG
800 TABLET ORAL
Qty: 0 | Refills: 0 | DISCHARGE

## 2020-07-28 RX ORDER — CARVEDILOL PHOSPHATE 80 MG/1
1 CAPSULE, EXTENDED RELEASE ORAL
Qty: 0 | Refills: 0 | DISCHARGE

## 2020-07-28 RX ORDER — ZOLPIDEM TARTRATE 10 MG/1
1 TABLET ORAL
Qty: 0 | Refills: 0 | DISCHARGE

## 2020-07-28 RX ORDER — IBUPROFEN 200 MG
1 TABLET ORAL
Qty: 0 | Refills: 0 | DISCHARGE

## 2020-07-28 RX ORDER — ACETAMINOPHEN 500 MG
1000 TABLET ORAL ONCE
Refills: 0 | Status: COMPLETED | OUTPATIENT
Start: 2020-07-28 | End: 2020-07-28

## 2020-07-28 RX ORDER — DEXTROSE MONOHYDRATE, SODIUM CHLORIDE, AND POTASSIUM CHLORIDE 50; .745; 4.5 G/1000ML; G/1000ML; G/1000ML
1000 INJECTION, SOLUTION INTRAVENOUS
Refills: 0 | Status: DISCONTINUED | OUTPATIENT
Start: 2020-07-28 | End: 2020-07-29

## 2020-07-28 RX ORDER — ATORVASTATIN CALCIUM 80 MG/1
40 TABLET, FILM COATED ORAL AT BEDTIME
Refills: 0 | Status: DISCONTINUED | OUTPATIENT
Start: 2020-07-28 | End: 2020-07-29

## 2020-07-28 RX ORDER — ASPIRIN/CALCIUM CARB/MAGNESIUM 324 MG
81 TABLET ORAL
Qty: 0 | Refills: 0 | DISCHARGE

## 2020-07-28 RX ORDER — ACETAMINOPHEN 500 MG
1000 TABLET ORAL
Refills: 0 | Status: COMPLETED | OUTPATIENT
Start: 2020-07-28 | End: 2020-07-28

## 2020-07-28 RX ORDER — SODIUM CHLORIDE 9 MG/ML
500 INJECTION, SOLUTION INTRAVENOUS
Refills: 0 | Status: DISCONTINUED | OUTPATIENT
Start: 2020-07-28 | End: 2020-07-28

## 2020-07-28 RX ORDER — POTASSIUM CHLORIDE 20 MEQ
20 PACKET (EA) ORAL
Refills: 0 | Status: COMPLETED | OUTPATIENT
Start: 2020-07-28 | End: 2020-07-28

## 2020-07-28 RX ORDER — CARVEDILOL PHOSPHATE 80 MG/1
3.12 CAPSULE, EXTENDED RELEASE ORAL EVERY 12 HOURS
Refills: 0 | Status: DISCONTINUED | OUTPATIENT
Start: 2020-07-28 | End: 2020-07-29

## 2020-07-28 RX ORDER — ATORVASTATIN CALCIUM 80 MG/1
1 TABLET, FILM COATED ORAL
Qty: 0 | Refills: 0 | DISCHARGE

## 2020-07-28 RX ADMIN — Medication 1 PATCH: at 07:09

## 2020-07-28 RX ADMIN — HEPARIN SODIUM 5000 UNIT(S): 5000 INJECTION INTRAVENOUS; SUBCUTANEOUS at 13:58

## 2020-07-28 RX ADMIN — CARVEDILOL PHOSPHATE 3.12 MILLIGRAM(S): 80 CAPSULE, EXTENDED RELEASE ORAL at 23:45

## 2020-07-28 RX ADMIN — Medication 5 MILLIGRAM(S): at 11:20

## 2020-07-28 RX ADMIN — Medication 1 PATCH: at 11:20

## 2020-07-28 RX ADMIN — Medication 1000 MILLIGRAM(S): at 22:15

## 2020-07-28 RX ADMIN — Medication 400 MILLIGRAM(S): at 22:00

## 2020-07-28 RX ADMIN — Medication 400 MILLIGRAM(S): at 07:58

## 2020-07-28 RX ADMIN — HEPARIN SODIUM 5000 UNIT(S): 5000 INJECTION INTRAVENOUS; SUBCUTANEOUS at 21:59

## 2020-07-28 RX ADMIN — Medication 400 MILLIGRAM(S): at 16:16

## 2020-07-28 RX ADMIN — Medication 1 PATCH: at 17:49

## 2020-07-28 RX ADMIN — Medication 1 PATCH: at 19:32

## 2020-07-28 RX ADMIN — HEPARIN SODIUM 5000 UNIT(S): 5000 INJECTION INTRAVENOUS; SUBCUTANEOUS at 05:17

## 2020-07-28 RX ADMIN — Medication 1000 MILLIGRAM(S): at 08:13

## 2020-07-28 RX ADMIN — DEXTROSE MONOHYDRATE, SODIUM CHLORIDE, AND POTASSIUM CHLORIDE 80 MILLILITER(S): 50; .745; 4.5 INJECTION, SOLUTION INTRAVENOUS at 09:39

## 2020-07-28 RX ADMIN — Medication 50 MILLIEQUIVALENT(S): at 10:01

## 2020-07-28 RX ADMIN — Medication 50 MILLIEQUIVALENT(S): at 12:30

## 2020-07-28 RX ADMIN — ATORVASTATIN CALCIUM 40 MILLIGRAM(S): 80 TABLET, FILM COATED ORAL at 21:59

## 2020-07-28 RX ADMIN — Medication 50 MILLIEQUIVALENT(S): at 14:00

## 2020-07-28 RX ADMIN — Medication 1000 MILLIGRAM(S): at 16:31

## 2020-07-28 NOTE — PROGRESS NOTE ADULT - SUBJECTIVE AND OBJECTIVE BOX
69y with NICM and LV dysfunction admitt.ed with SBO has improved since yesterday,  ABd distention significantly reduced and NGT removed.  He had NSVT of several beats on monitor    cucumber (Anaphylaxis; Hives; Urticaria; Short breath)  No Known Drug Allergies      Male    Intestinal obstruction  H/o or current diagnosis of HF- ACEI/ARB contraindication unknown  Handoff  MEWS Score  Prostate CA  Weak heart  Insomnia  HTN (hypertension)  Small bowel obstruction  Nausea and vomiting  Need for prophylactic measure  Chronic diastolic congestive heart failure  Essential hypertension  Acute renal failure, unspecified acute renal failure type  Small bowel obstruction  Pre-operative cardiovascular examination  Chronic heart failure, unspecified heart failure type  Urinary retention  Prostate CA  S/P right inguinal herniorrhaphy  History of back surgery  ICD (implantable cardioverter-defibrillator) in place  Abdominal hernia  VOMITING  90+  Acute renal failure, unspecified acute renal failure type  [email or web address]        07-27 @ 07:01  -  07-28 @ 07:00  --------------------------------------------------------  IN: 900 mL / OUT: 1400 mL / NET: -500 mL    07-28 @ 07:01  -  07-29 @ 01:03  --------------------------------------------------------  IN: 600 mL / OUT: 1820 mL / NET: -1220 mL                atorvastatin 40 milliGRAM(s) Oral at bedtime  carvedilol 3.125 milliGRAM(s) Oral every 12 hours  chlorhexidine 4% Liquid 1 Application(s) Topical <User Schedule>  heparin   Injectable 5000 Unit(s) SubCutaneous every 8 hours  nicotine -  14 mG/24Hr(s) Patch 1 patch Transdermal daily  sodium chloride 0.45% with potassium chloride 20 mEq/L 1000 milliLiter(s) IV Continuous <Continuous>      T(C): 36.8 (07-28-20 @ 23:07), Max: 36.9 (07-28-20 @ 05:15)  HR: 93 (07-28-20 @ 23:07) (86 - 94)  BP: 104/70 (07-28-20 @ 23:07) (96/63 - 113/69)  RR: 18 (07-28-20 @ 23:07) (18 - 19)  SpO2: 97% (07-28-20 @ 23:07) (92% - 97%)  Wt(kg): --                          12.7   5.74  )-----------( 233      ( 28 Jul 2020 06:22 )             40.6       FAMILY HISTORY:      MEDICATIONS  (STANDING):  atorvastatin 40 milliGRAM(s) Oral at bedtime  carvedilol 3.125 milliGRAM(s) Oral every 12 hours  chlorhexidine 4% Liquid 1 Application(s) Topical <User Schedule>  heparin   Injectable 5000 Unit(s) SubCutaneous every 8 hours  nicotine -  14 mG/24Hr(s) Patch 1 patch Transdermal daily  sodium chloride 0.45% with potassium chloride 20 mEq/L 1000 milliLiter(s) (80 mL/Hr) IV Continuous <Continuous>    MEDICATIONS  (PRN):      PAST MEDICAL & SURGICAL HISTORY:  Prostate CA  Weak heart  Insomnia  HTN (hypertension)  S/P right inguinal herniorrhaphy  History of back surgery  ICD (implantable cardioverter-defibrillator) in place  Abdominal hernia            REVIEW OF SYSTEMS:    CONSTITUTIONAL:  No weight loss, fever, chills, weakness or fatigue.  HEENT:  Eyes:  No visual loss, blurred vision, double vision or yellow sclerae. Ears, Nose, Throat:  No hearing loss, sneezing, congestion, runny nose or sore throat.  SKIN:  No rash or itching.  CARDIOVASCULAR:  No chest pain, chest pressure or chest discomfort. No palpitations or edema.  RESPIRATORY:  No shortness of breath, LUO, cough or sputum.  GASTROINTESTINAL:  No anorexia, nausea, vomiting or diarrhea. No abdominal pain or blood.  GENITOURINARY:  No dysuria, urgency, or frequency  NEUROLOGICAL:  No headache, dizziness, syncope, paralysis, ataxia, numbness or tingling in the extremities. No change in bowel or bladder control.  MUSCULOSKELETAL:  No muscle, back pain, joint pain or stiffness.  HEMATOLOGIC:  No anemia, bleeding or bruising.  LYMPHATICS:  No enlarged nodes. No history of splenectomy.  PSYCHIATRIC:  No history of depression or anxiety.  ENDOCRINOLOGIC:  No reports of sweating, cold or heat intolerance. No polyuria or polydipsia.  ALLERGIES:  No history of asthma, hives, eczema or rhinitis    Vital Signs Last 24 Hrs  T(C): 36.8 (28 Jul 2020 23:07), Max: 36.9 (28 Jul 2020 05:15)  T(F): 98.3 (28 Jul 2020 23:07), Max: 98.5 (28 Jul 2020 05:15)  HR: 93 (28 Jul 2020 23:07) (86 - 94)  BP: 104/70 (28 Jul 2020 23:07) (96/63 - 113/69)  BP(mean): --  RR: 18 (28 Jul 2020 23:07) (18 - 19)  SpO2: 97% (28 Jul 2020 23:07) (92% - 97%)    General Appearance:  Comfortable, NAD  HEENT: PERRLA, EOMI, Fundi not visualized, Pharynx clear, JVP 5 cm, Carotid pulses 2+ B/L, no bruit  Chest: Clear to ascultation B/L  CV: PMI not displaced, S1S2 normal, No S3/S4 or murmur  Abd: soft  no distention or tenderness  Extrem: Radial and femoral pulses 2+ B/L.  No edema  Neuro: A+O X3,  Motor grossly intact    CBC Full  -  ( 28 Jul 2020 06:22 )  WBC Count : 5.74 K/uL  RBC Count : 4.71 M/uL  Hemoglobin : 12.7 g/dL  Hematocrit : 40.6 %  Platelet Count - Automated : 233 K/uL  Mean Cell Volume : 86.2 fl  Mean Cell Hemoglobin : 27.0 pg  Mean Cell Hemoglobin Concentration : 31.3 gm/dL  Auto Neutrophil # : 3.97 K/uL  Auto Lymphocyte # : 0.74 K/uL  Auto Monocyte # : 0.93 K/uL  Auto Eosinophil # : 0.03 K/uL  Auto Basophil # : 0.03 K/uL  Auto Neutrophil % : 69.2 %  Auto Lymphocyte % : 12.9 %  Auto Monocyte % : 16.2 %  Auto Eosinophil % : 0.5 %  Auto Basophil % : 0.5 %      CARDIAC MARKERS ( 28 Jul 2020 16:41 )  x     / x     / 69 U/L / x     / x      CARDIAC MARKERS ( 28 Jul 2020 06:22 )  x     / x     / 60 U/L / x     / x            07-28    142  |  98  |  52.0<H>  ----------------------------<  95  4.5   |  30.0<H>  |  1.44<H>    Ca    8.6      28 Jul 2020 16:41  Phos  2.4     07-28  Mg     3.0     07-28    TPro  x   /  Alb  3.3  /  TBili  x   /  DBili  x   /  AST  x   /  ALT  x   /  AlkPhos  x   07-28

## 2020-07-28 NOTE — PROGRESS NOTE ADULT - ASSESSMENT
69M, extensive medical and cardiac history, presents with a high grade SBO. Patient also found to have a sigmoid containing left inguinal hernia and an ZANE on admission. No evidence of incarceration or strangulation. NGT in place.     -NGT decompression IVF, NPO  -No emergent surgical intervention indicated  -Serial abdominal exams  -Monitor urine output

## 2020-07-28 NOTE — PROGRESS NOTE ADULT - ASSESSMENT
69M with a PMHx chronic systolic CHF , s/p AICD/PPM, prostate ca s/p radical prostatectomy, urinary incontinence, HLD, HTN, insomnia, chronic pain syndrome from lumbar disc herniations who presents with a 1-week history of abdominal bloating, nausea, vomiting, and loose dark stools. Patient initially attributed his symptoms to food poising, however abdominal discomfort did not improve with conservative measures. His abdominal pain is rated as a 6/10 and is described as a "knot-like" feeling in his umbilicus. It is associated with decreased appetite. No fevers, chills, CP, or SOB. Multiple attempts to place Dye in ED - failed ,  consulted .   Multiple attempts on 7/27  to pass catheter. Bedside cystoscopy was performed. There was a stricture of the bladder neck. The bladder neck was dilated up to 18fr and 16fr [place] catheter was placed. Dye placed by  ON 7/27 . Pain better controlled , high NGT output , on ivf .      Problem/Recommendation - 1:  Problem: Small bowel obstruction. Recommendation: NPO , IVF - as per primary team.     Problem/Recommendation - 2:  ·  Problem: Acute renal failure due to ATN 2/2 volume depletion   Recommendation: resolving , continue ivf , follow BMP , renal on board   avoid nephrotoxic medications     Problem/Recommendation - 3:  ·  Problem: Urinary retention.  Recommendation: Hx of prostate cancer , c/o decreased urine output , dribbling of urine , unable to empty bladder  . Unable to pass catheter.  consulted . Bedside cystoscopy was performed. There was a stricture of the bladder neck. The bladder neck was dilated up to 18fr and 16fr [place] catheter was placed. Dye placed , Flomax started . TOV as per  .      Problem/Recommendation - 4:  ·  Problem: Essential hypertension.  Recommendation: BP on lower side , hold ACE/ARB's due to ARF , continue BB ( Metoprolol 5 mg Q6 hrs , hold for SBP < 100.      Problem/Recommendation - 5:  ·  Problem: Chronic diastolic congestive heart failure.  Recommendation: Hx of NICM with EF of 35%-45%  AICD/PPM - follows with Dr Jha - consulted , appreciated   cautious iv fluids .  Monitor noted with NSVT episodes - patient is asymptomatic , keep K> 4 , MAG > 2 - continue monitor      Problem/Recommendation - 6:  Problem: Need for prophylactic measure. Recommendation: Heparin SQ.

## 2020-07-28 NOTE — PROGRESS NOTE ADULT - SUBJECTIVE AND OBJECTIVE BOX
INTERVAL HPI/OVERNIGHT EVENTS:    SUBJECTIVE: Patient evaluated at bedside, found resting comfortably in bed, nad. No acute complaints or concerns. Patient with improved abdominal pain since admission, though continues to have significant output from NGT. Denies sob, chest pain, n/v, fever/chills.       MEDICATIONS  (STANDING):  chlorhexidine 4% Liquid 1 Application(s) Topical <User Schedule>  heparin   Injectable 5000 Unit(s) SubCutaneous every 8 hours  metoprolol tartrate Injectable 5 milliGRAM(s) IV Push every 6 hours  multiple electrolytes Injection Type 1 1000 milliLiter(s) (75 mL/Hr) IV Continuous <Continuous>  nicotine -  14 mG/24Hr(s) Patch 1 patch Transdermal daily    MEDICATIONS  (PRN):      Vital Signs Last 24 Hrs  T(C): 36.9 (2020 05:15), Max: 37.1 (2020 23:26)  T(F): 98.5 (2020 05:15), Max: 98.8 (2020 23:26)  HR: 89 (2020 05:15) (89 - 98)  BP: 96/63 (2020 05:15) (95/60 - 100/67)  BP(mean): --  RR: 18 (2020 05:15) (16 - 18)  SpO2: 94% (2020 05:15) (93% - 100%)    PE  Gen: resting comfortably in bed, nad  HEENT: NGT in place with dark bilious output  Pulm: no increased wob, no conversational dyspnea  Abd: mildly distended, soft, non-tender, non-peritoneal  : verma in place, no evidence of hematuria  Ext: distal extremities warm and well-perfused, no peripheral edema        I&O's Detail    2020 07:01  -  2020 07:00  --------------------------------------------------------  IN:  Total IN: 0 mL    OUT:    Nasoenteral Tube: 2200 mL  Total OUT: 2200 mL    Total NET: -2200 mL      2020 07:01  -  2020 05:37  --------------------------------------------------------  IN:    multiple electrolytes Injection Type 1: 900 mL  Total IN: 900 mL    OUT:    Nasoenteral Tube: 800 mL  Total OUT: 800 mL    Total NET: 100 mL          LABS:                        13.1   4.40  )-----------( 238      ( 2020 07:57 )             41.0     07-27    140  |  90<L>  |  63.0<H>  ----------------------------<  98  3.4<L>   |  32.0<H>  |  2.06<H>    Ca    8.8      2020 22:40  Phos  4.1       Mg     2.9         TPro  7.7  /  Alb  4.0  /  TBili  0.4  /  DBili  x   /  AST  17  /  ALT  16  /  AlkPhos  92  07-26      Urinalysis Basic - ( 2020 16:28 )    Color: Yellow / Appearance: Clear / S.020 / pH: x  Gluc: x / Ketone: Negative  / Bili: Small / Urobili: Negative mg/dL   Blood: x / Protein: 30 mg/dL / Nitrite: Negative   Leuk Esterase: Negative / RBC: 25-50 /HPF / WBC 3-5   Sq Epi: x / Non Sq Epi: Occasional / Bacteria: Few        RADIOLOGY & ADDITIONAL STUDIES:

## 2020-07-28 NOTE — PROGRESS NOTE ADULT - SUBJECTIVE AND OBJECTIVE BOX
Patient seen and examined . Comfortable , abdominal pain better controlled , NGT +  with high output .     CC : abdominal pain - better controlled       MEDICATIONS  (STANDING):  acetaminophen  IVPB .. 1000 milliGRAM(s) IV Intermittent once  acetaminophen  IVPB .. 1000 milliGRAM(s) IV Intermittent once  chlorhexidine 4% Liquid 1 Application(s) Topical <User Schedule>  heparin   Injectable 5000 Unit(s) SubCutaneous every 8 hours  metoprolol tartrate Injectable 5 milliGRAM(s) IV Push every 6 hours  nicotine -  14 mG/24Hr(s) Patch 1 patch Transdermal daily  potassium chloride  20 mEq/100 mL IVPB 20 milliEquivalent(s) IV Intermittent every 2 hours  sodium chloride 0.45% with potassium chloride 20 mEq/L 1000 milliLiter(s) (80 mL/Hr) IV Continuous <Continuous>    MEDICATIONS  (PRN):      LABS:                          12.7   5.74  )-----------( 233      ( 2020 06:22 )             40.6     07-28    143  |  94<L>  |  62.0<H>  ----------------------------<  91  3.2<L>   |  33.0<H>  |  1.90<H>    Ca    8.5<L>      2020 06:22  Phos  4.1       Mg     3.0         TPro  7.7  /  Alb  4.0  /  TBili  0.4  /  DBili  x   /  AST  17  /  ALT  16  /  AlkPhos  92  07-26      Urinalysis Basic - ( 2020 16:28 )    Color: Yellow / Appearance: Clear / S.020 / pH: x  Gluc: x / Ketone: Negative  / Bili: Small / Urobili: Negative mg/dL   Blood: x / Protein: 30 mg/dL / Nitrite: Negative   Leuk Esterase: Negative / RBC: 25-50 /HPF / WBC 3-5   Sq Epi: x / Non Sq Epi: Occasional / Bacteria: Few        RADIOLOGY & ADDITIONAL TESTS:  < from: Xray Chest 1 View- PORTABLE-Urgent (20 @ 04:30) >     EXAM:  XR CHEST PORTABLE URGENT 1V                          PROCEDURE DATE:  2020          INTERPRETATION:  CHEST AP PORTABLE:    History: confirm NGT placement.    Date and time of exam: 2020 4:07 AM.    Technique: A single AP view of the chest was obtained.    Comparison exam: 2020.    Findings:  A nasogastric tube has been inserted since the prior study. The tip is not visualized however is located in the region approximately of the gastric antrum. Again noted is an ICD witha single lead, unchanged. The lungs are clear. No evidence of pneumothorax or pleural effusion. The heart is not enlarged. No hilar or mediastinal abnormality..    Impression:  NG tube insertion as described.    MITA DUPREE M.D., ATTENDING RADIOLOGIST  This document has been electronically signed. 2020  2:49PM    < end of copied text >      I&O's Detail    2020 07:  -  2020 07:00  --------------------------------------------------------  IN:  Total IN: 0 mL    OUT:    Nasoenteral Tube: 2200 mL  Total OUT: 2200 mL    Total NET: -2200 mL      2020 07:01  -  2020 05:37  --------------------------------------------------------  IN:    multiple electrolytes Injection Type 1: 900 mL  Total IN: 900 mL    OUT:    Nasoenteral Tube: 800 mL  Total OUT: 800 mL    Total NET: 100 mL        REVIEW OF SYSTEMS:    Abdominal pain better controlled today , all other systems are reviewed and are negative .     Vital Signs Last 24 Hrs  T(C): 36.9 (2020 12:46), Max: 37.1 (2020 23:26)  T(F): 98.4 (2020 12:46), Max: 98.8 (2020 23:26)  HR: 88 (2020 12:46) (86 - 98)  BP: 105/70 (2020 12:46) (96/63 - 112/74)  BP(mean): --  RR: 19 (2020 12:46) (18 - 19)  SpO2: 95% (2020 12:46) (93% - 100%)    PHYSICAL EXAM:    GENERAL: NAD, well-groomed, well-developed , NGT with dark greenish fluid +   HEAD:  Atraumatic, Normocephalic  EYES: EOMI, PERRLA, conjunctiva and sclera clear  NECK: Supple, No JVD, Normal thyroid  NERVOUS SYSTEM:  Alert & Oriented X3, no focal deficit  CHEST/LUNG: CTA b/l ,  no  rales, rhonchi, wheezing, or rubs  HEART: Regular rate and rhythm; No murmurs, rubs, or gallops  ABDOMEN: Soft, mildly distended , no rebound , no guarding  , decreased bs+   EXTREMITIES:  2+ Peripheral Pulses, No clubbing, cyanosis, or edema  LYMPH: No lymphadenopathy noted  SKIN: No rashes or lesions  Dye cath + ,

## 2020-07-28 NOTE — PROGRESS NOTE ADULT - ASSESSMENT
69M, extensive medical and cardiac history, presents with a high grade SBO. Patient also found to have a sigmoid containing left inguinal hernia and an ZANE on admission. No evidence of incarceration or strangulation. NGT in place.     - continue non-op managment with NGT decompression IVF, NPO  -No emergent surgical intervention indicated  -NPO, NGT to LCWS, IVF, replace NGT output  -Serial abdominal exams  -Monitor urine output

## 2020-07-28 NOTE — PROGRESS NOTE ADULT - SUBJECTIVE AND OBJECTIVE BOX
Vital Signs Last 24 Hrs,    T(C): 36.9 (28 Jul 2020 08:10), Max: 37.1 (27 Jul 2020 23:26)  T(F): 98.4 (28 Jul 2020 08:10), Max: 98.8 (27 Jul 2020 23:26)  HR: 94 (28 Jul 2020 08:10) (89 - 98)  BP: 104/68 (28 Jul 2020 08:10) (96/63 - 104/68)  RR: 18 (28 Jul 2020 08:10) (18 - 18)  SpO2: 97% (28 Jul 2020 08:10) (93% - 100%)    [facility] DIVISION OF KIDNEY DISEASES AND HYPERTENSION -- FOLLOW UP NOTE  --------------------------------------------------------------------------------  Chief Complaint:    24 hour events/subjective:    PAST HISTORY  --------------------------------------------------------------------------------  No significant changes to PMH, PSH, FHx, SHx, unless otherwise noted    ALLERGIES & MEDICATIONS  --------------------------------------------------------------------------------  Allergies    cucumber (Anaphylaxis; Hives; Urticaria; Short breath)  No Known Drug Allergies    Standing Inpatient Medications  chlorhexidine 4% Liquid 1 Application(s) Topical <User Schedule>  heparin   Injectable 5000 Unit(s) SubCutaneous every 8 hours  metoprolol tartrate Injectable 5 milliGRAM(s) IV Push every 6 hours  nicotine -  14 mG/24Hr(s) Patch 1 patch Transdermal daily  potassium chloride  20 mEq/100 mL IVPB 20 milliEquivalent(s) IV Intermittent every 2 hours  sodium chloride 0.45% with potassium chloride 20 mEq/L 1000 milliLiter(s) IV Continuous <Continuous>    PRN Inpatient Medications    REVIEW OF SYSTEMS  --------------------------------------------------------------------------------  Gen: No weight changes, fatigue, fevers/chills, weakness  Skin: No rashes  Head/Eyes/Ears/Mouth: No headache; Normal hearing; Normal vision w/o blurriness; No sinus pain/discomfort, sore throat  Respiratory: No dyspnea, cough, wheezing, hemoptysis  CV: No chest pain, PND, orthopnea  GI: No abdominal pain, diarrhea, constipation, nausea, vomiting, melena, hematochezia  : No increased frequency, dysuria, hematuria, nocturia  MSK: No joint pain/swelling; no back pain; no edema  Neuro: No dizziness/lightheadedness, weakness, seizures, numbness, tingling  Heme: No easy bruising or bleeding  Endo: No heat/cold intolerance  Psych: No significant nervousness, anxiety, stress, depression    All other systems were reviewed and are negative, except as noted.    VITALS/PHYSICAL EXAM  --------------------------------------------------------------------------------  T(C): 36.9 (07-28-20 @ 08:10), Max: 37.1 (07-27-20 @ 23:26)  HR: 94 (07-28-20 @ 08:10) (89 - 98)  BP: 104/68 (07-28-20 @ 08:10) (96/63 - 104/68)  RR: 18 (07-28-20 @ 08:10) (18 - 18)  SpO2: 97% (07-28-20 @ 08:10) (93% - 100%)    Height (cm): 190.5 (07-26-20 @ 17:24)  Weight (kg): 97.5 (07-26-20 @ 17:24)    BMI (kg/m2): 26.9 (07-26-20 @ 17:24)  BSA (m2): 2.26 (07-26-20 @ 17:24)    07-27-20 @ 07:01  -  07-28-20 @ 07:00  --------------------------------------------------------  IN: 900 mL / OUT: 1400 mL / NET: -500 mL    Physical Exam:  	Gen: NAD, well-appearing  	HEENT: PERRL, supple neck, clear oropharynx  	Pulm: CTA B/L  	CV: RRR, S1S2; no rub  	Back: No spinal or CVA tenderness; no sacral edema  	Abd: +BS, soft, nontender/nondistended  	: No suprapubic tenderness  	UE: Warm, FROM, no clubbing, intact strength; no edema; no asterixis  	LE: Warm, FROM, no clubbing, intact strength; no edema  	Neuro: No focal deficits, intact gait  	Psych: Normal affect and mood  	Skin: Warm, without rashes  	Vascular access:    LABS/STUDIES  --------------------------------------------------------------------------------              12.7   5.74  >-----------<  233      [07-28-20 @ 06:22]              40.6     143  |  94  |  62.0  ----------------------------<  91      [07-28-20 @ 06:22]  3.2   |  33.0  |  1.90        Ca     8.5     [07-28-20 @ 06:22]      Mg     3.0     [07-28-20 @ 06:22]      Phos  4.1     [07-28-20 @ 06:22]    TPro  7.7  /  Alb  4.0  /  TBili  0.4  /  DBili  x   /  AST  17  /  ALT  16  /  AlkPhos  92  [07-26-20 @ 18:57]    CK 60      [07-28-20 @ 06:22]    Creatinine Trend:  SCr 1.90 [07-28 @ 06:22]  SCr 2.06 [07-27 @ 22:40]  SCr 2.69 [07-27 @ 07:57]  SCr 3.15 [07-26 @ 18:57]    Urinalysis - [07-27-20 @ 16:28]      Color Yellow / Appearance Clear / SG 1.020 / pH 5.0      Gluc Negative / Ketone Negative  / Bili Small / Urobili Negative       Blood Moderate / Protein 30 / Leuk Est Negative / Nitrite Negative      RBC 25-50 / WBC 3-5 / Hyaline  / Gran  / Sq Epi  / Non Sq Epi Occasional / Bacteria Few    Urine Creatinine 270      [07-27-20 @ 16:26]  Urine Protein 22.0      [07-27-20 @ 16:26]  Urine Sodium 38      [07-27-20 @ 16:26]  Urine Osmolality 635      [07-27-20 @ 16:26]    HCV 0.10, Nonreact      [07-27-20 @ 15:40]      DX : ATN    SBO    Rec :     KDIGO Care Bundle:    Avoidance of nephrotoxins/nephrotoxin medication adjustment  Medication dosage adjustment for kidney function  Continuous IVF administration   Discontinuation of ACE/ARBs for first 48 postoperative hours - as Applicable,   Close monitoring of serum Creatinine and U. O  Acid-base, electrolyte and albumin status management  Avoidance of hyperglycemia for first 72 postoperative hours  Consider alternatives to radiocontrast administration  Optimizing hemodynamics:    Replete K+, Mg++ :

## 2020-07-28 NOTE — PROGRESS NOTE ADULT - ASSESSMENT
Small bowel obstruction improved with noninvasive therapies.  Remains NPO with advancement diet tomorrow.  NSVT on  monitor- 3 beats.  Electrolytes corrected.  Renal failure improved with hydration.  I recommend hold off ACEI until renal function normalizes.  Transition to oral hydration and eliminate IVF to reduce risk of fluid overload Small bowel obstruction improved with noninvasive therapies.  Remains NPO with advancement diet tomorrow.  NSVT on  monitor- 3 beats.  Electrolytes corrected.  Renal failure improved with hydration.  I recommend hold off ACEI until renal function normalizes.  Transition to oral hydration and eliminate IVF to reduce risk of fluid overload.  Restart oral carvedilol today and if tolerated tomorrow raise to 6.25 mg BID with return to outpt dose as an outpt.  BP is WNL and anticipate to remain so once tolerating oral diet.  D/W pt

## 2020-07-29 LAB
ABO RH CONFIRMATION: SIGNIFICANT CHANGE UP
ANION GAP SERPL CALC-SCNC: 13 MMOL/L — SIGNIFICANT CHANGE UP (ref 5–17)
ANION GAP SERPL CALC-SCNC: 14 MMOL/L — SIGNIFICANT CHANGE UP (ref 5–17)
APTT BLD: 28.1 SEC — SIGNIFICANT CHANGE UP (ref 27.5–35.5)
BASOPHILS # BLD AUTO: 0.03 K/UL — SIGNIFICANT CHANGE UP (ref 0–0.2)
BASOPHILS NFR BLD AUTO: 0.5 % — SIGNIFICANT CHANGE UP (ref 0–2)
BUN SERPL-MCNC: 33 MG/DL — HIGH (ref 8–20)
BUN SERPL-MCNC: 44 MG/DL — HIGH (ref 8–20)
CALCIUM SERPL-MCNC: 8.1 MG/DL — LOW (ref 8.6–10.2)
CALCIUM SERPL-MCNC: 8.1 MG/DL — LOW (ref 8.6–10.2)
CHLORIDE SERPL-SCNC: 101 MMOL/L — SIGNIFICANT CHANGE UP (ref 98–107)
CHLORIDE SERPL-SCNC: 101 MMOL/L — SIGNIFICANT CHANGE UP (ref 98–107)
CK SERPL-CCNC: 56 U/L — SIGNIFICANT CHANGE UP (ref 30–200)
CO2 SERPL-SCNC: 28 MMOL/L — SIGNIFICANT CHANGE UP (ref 22–29)
CO2 SERPL-SCNC: 28 MMOL/L — SIGNIFICANT CHANGE UP (ref 22–29)
CREAT SERPL-MCNC: 1.12 MG/DL — SIGNIFICANT CHANGE UP (ref 0.5–1.3)
CREAT SERPL-MCNC: 1.25 MG/DL — SIGNIFICANT CHANGE UP (ref 0.5–1.3)
EOSINOPHIL # BLD AUTO: 0.03 K/UL — SIGNIFICANT CHANGE UP (ref 0–0.5)
EOSINOPHIL NFR BLD AUTO: 0.5 % — SIGNIFICANT CHANGE UP (ref 0–6)
GAS PNL BLDA: SIGNIFICANT CHANGE UP
GLUCOSE SERPL-MCNC: 106 MG/DL — HIGH (ref 70–99)
GLUCOSE SERPL-MCNC: 83 MG/DL — SIGNIFICANT CHANGE UP (ref 70–99)
HCT VFR BLD CALC: 41.8 % — SIGNIFICANT CHANGE UP (ref 39–50)
HGB BLD-MCNC: 13.1 G/DL — SIGNIFICANT CHANGE UP (ref 13–17)
IMM GRANULOCYTES NFR BLD AUTO: 1.1 % — SIGNIFICANT CHANGE UP (ref 0–1.5)
INR BLD: 1.2 RATIO — HIGH (ref 0.88–1.16)
LACTATE SERPL-SCNC: 1.6 MMOL/L — SIGNIFICANT CHANGE UP (ref 0.5–2)
LYMPHOCYTES # BLD AUTO: 0.47 K/UL — LOW (ref 1–3.3)
LYMPHOCYTES # BLD AUTO: 7.1 % — LOW (ref 13–44)
MAGNESIUM SERPL-MCNC: 2.9 MG/DL — HIGH (ref 1.6–2.6)
MCHC RBC-ENTMCNC: 27.4 PG — SIGNIFICANT CHANGE UP (ref 27–34)
MCHC RBC-ENTMCNC: 31.3 GM/DL — LOW (ref 32–36)
MCV RBC AUTO: 87.4 FL — SIGNIFICANT CHANGE UP (ref 80–100)
MONOCYTES # BLD AUTO: 0.87 K/UL — SIGNIFICANT CHANGE UP (ref 0–0.9)
MONOCYTES NFR BLD AUTO: 13.2 % — SIGNIFICANT CHANGE UP (ref 2–14)
MYOGLOBIN SERPL-MCNC: 83 NG/ML — HIGH (ref 28–72)
NEUTROPHILS # BLD AUTO: 5.12 K/UL — SIGNIFICANT CHANGE UP (ref 1.8–7.4)
NEUTROPHILS NFR BLD AUTO: 77.6 % — HIGH (ref 43–77)
PHOSPHATE SERPL-MCNC: 2 MG/DL — LOW (ref 2.4–4.7)
PLATELET # BLD AUTO: 247 K/UL — SIGNIFICANT CHANGE UP (ref 150–400)
POTASSIUM SERPL-MCNC: 3.7 MMOL/L — SIGNIFICANT CHANGE UP (ref 3.5–5.3)
POTASSIUM SERPL-MCNC: 3.9 MMOL/L — SIGNIFICANT CHANGE UP (ref 3.5–5.3)
POTASSIUM SERPL-SCNC: 3.7 MMOL/L — SIGNIFICANT CHANGE UP (ref 3.5–5.3)
POTASSIUM SERPL-SCNC: 3.9 MMOL/L — SIGNIFICANT CHANGE UP (ref 3.5–5.3)
PROTHROM AB SERPL-ACNC: 13.8 SEC — HIGH (ref 10.6–13.6)
RBC # BLD: 4.78 M/UL — SIGNIFICANT CHANGE UP (ref 4.2–5.8)
RBC # FLD: 14.5 % — SIGNIFICANT CHANGE UP (ref 10.3–14.5)
SODIUM SERPL-SCNC: 142 MMOL/L — SIGNIFICANT CHANGE UP (ref 135–145)
SODIUM SERPL-SCNC: 143 MMOL/L — SIGNIFICANT CHANGE UP (ref 135–145)
WBC # BLD: 6.59 K/UL — SIGNIFICANT CHANGE UP (ref 3.8–10.5)
WBC # FLD AUTO: 6.59 K/UL — SIGNIFICANT CHANGE UP (ref 3.8–10.5)

## 2020-07-29 PROCEDURE — 71045 X-RAY EXAM CHEST 1 VIEW: CPT | Mod: 26

## 2020-07-29 PROCEDURE — 44120 REMOVAL OF SMALL INTESTINE: CPT

## 2020-07-29 PROCEDURE — 74177 CT ABD & PELVIS W/CONTRAST: CPT | Mod: 26

## 2020-07-29 PROCEDURE — 99233 SBSQ HOSP IP/OBS HIGH 50: CPT

## 2020-07-29 PROCEDURE — 44120 REMOVAL OF SMALL INTESTINE: CPT | Mod: 80

## 2020-07-29 PROCEDURE — 99232 SBSQ HOSP IP/OBS MODERATE 35: CPT | Mod: 57

## 2020-07-29 PROCEDURE — 99232 SBSQ HOSP IP/OBS MODERATE 35: CPT

## 2020-07-29 RX ORDER — ONDANSETRON 8 MG/1
4 TABLET, FILM COATED ORAL EVERY 6 HOURS
Refills: 0 | Status: DISCONTINUED | OUTPATIENT
Start: 2020-07-30 | End: 2020-08-05

## 2020-07-29 RX ORDER — POTASSIUM PHOSPHATE, MONOBASIC POTASSIUM PHOSPHATE, DIBASIC 236; 224 MG/ML; MG/ML
15 INJECTION, SOLUTION INTRAVENOUS ONCE
Refills: 0 | Status: COMPLETED | OUTPATIENT
Start: 2020-07-29 | End: 2020-07-29

## 2020-07-29 RX ORDER — SODIUM CHLORIDE 9 MG/ML
1000 INJECTION, SOLUTION INTRAVENOUS ONCE
Refills: 0 | Status: COMPLETED | OUTPATIENT
Start: 2020-07-29 | End: 2020-07-29

## 2020-07-29 RX ORDER — SODIUM CHLORIDE 9 MG/ML
1000 INJECTION, SOLUTION INTRAVENOUS
Refills: 0 | Status: DISCONTINUED | OUTPATIENT
Start: 2020-07-29 | End: 2020-07-29

## 2020-07-29 RX ORDER — HYDROMORPHONE HYDROCHLORIDE 2 MG/ML
30 INJECTION INTRAMUSCULAR; INTRAVENOUS; SUBCUTANEOUS
Refills: 0 | Status: DISCONTINUED | OUTPATIENT
Start: 2020-07-30 | End: 2020-08-03

## 2020-07-29 RX ORDER — NALOXONE HYDROCHLORIDE 4 MG/.1ML
0.1 SPRAY NASAL
Refills: 0 | Status: DISCONTINUED | OUTPATIENT
Start: 2020-07-30 | End: 2020-08-05

## 2020-07-29 RX ADMIN — CARVEDILOL PHOSPHATE 3.12 MILLIGRAM(S): 80 CAPSULE, EXTENDED RELEASE ORAL at 17:18

## 2020-07-29 RX ADMIN — POTASSIUM PHOSPHATE, MONOBASIC POTASSIUM PHOSPHATE, DIBASIC 62.5 MILLIMOLE(S): 236; 224 INJECTION, SOLUTION INTRAVENOUS at 10:40

## 2020-07-29 RX ADMIN — HEPARIN SODIUM 5000 UNIT(S): 5000 INJECTION INTRAVENOUS; SUBCUTANEOUS at 13:53

## 2020-07-29 RX ADMIN — SODIUM CHLORIDE 250 MILLILITER(S): 9 INJECTION, SOLUTION INTRAVENOUS at 19:09

## 2020-07-29 RX ADMIN — Medication 1 PATCH: at 12:06

## 2020-07-29 RX ADMIN — Medication 1 PATCH: at 12:37

## 2020-07-29 RX ADMIN — SODIUM CHLORIDE 75 MILLILITER(S): 9 INJECTION, SOLUTION INTRAVENOUS at 12:06

## 2020-07-29 RX ADMIN — SODIUM CHLORIDE 75 MILLILITER(S): 9 INJECTION, SOLUTION INTRAVENOUS at 10:40

## 2020-07-29 RX ADMIN — HEPARIN SODIUM 5000 UNIT(S): 5000 INJECTION INTRAVENOUS; SUBCUTANEOUS at 05:36

## 2020-07-29 RX ADMIN — Medication 1 PATCH: at 19:20

## 2020-07-29 RX ADMIN — Medication 1 PATCH: at 07:57

## 2020-07-29 RX ADMIN — CARVEDILOL PHOSPHATE 3.12 MILLIGRAM(S): 80 CAPSULE, EXTENDED RELEASE ORAL at 12:06

## 2020-07-29 NOTE — PROGRESS NOTE ADULT - SUBJECTIVE AND OBJECTIVE BOX
INTERVAL HPI/OVERNIGHT EVENTS:    MEDICATIONS  (STANDING):  atorvastatin 40 milliGRAM(s) Oral at bedtime  carvedilol 3.125 milliGRAM(s) Oral every 12 hours  chlorhexidine 4% Liquid 1 Application(s) Topical <User Schedule>  heparin   Injectable 5000 Unit(s) SubCutaneous every 8 hours  nicotine -  14 mG/24Hr(s) Patch 1 patch Transdermal daily  sodium chloride 0.45% with potassium chloride 20 mEq/L 1000 milliLiter(s) (80 mL/Hr) IV Continuous <Continuous>    MEDICATIONS  (PRN):      Allergies    cucumber (Anaphylaxis; Hives; Urticaria; Short breath)  No Known Drug Allergies    Intolerances        Vital Signs Last 24 Hrs  T(C): 36.3 (2020 04:29), Max: 36.9 (2020 08:10)  T(F): 97.3 (2020 04:29), Max: 98.4 (2020 08:10)  HR: 75 (2020 04:29) (75 - 94)  BP: 103/69 (2020 04:29) (103/69 - 113/69)  BP(mean): --  RR: 18 (2020 04:29) (18 - 19)  SpO2: 97% (2020 04:29) (92% - 97%)     ON PE:  General: alert and awake  Abdomen: No flank pain  : Dye intact. Urine clear    LABS:                        12.7   5.74  )-----------( 233      ( 2020 06:22 )             40.6     07-29    142  |  101  |  44.0<H>  ----------------------------<  83  3.7   |  28.0  |  1.25    Ca    8.1<L>      2020 06:05  Phos  2.0     07-  Mg     2.9         TPro  x   /  Alb  3.3  /  TBili  x   /  DBili  x   /  AST  x   /  ALT  x   /  AlkPhos  x         Urinalysis Basic - ( 2020 15:24 )    Color: Yellow / Appearance: Clear / S.010 / pH: x  Gluc: x / Ketone: Small  / Bili: Negative / Urobili: Negative mg/dL   Blood: x / Protein: Negative mg/dL / Nitrite: Negative   Leuk Esterase: Negative / RBC: 25-50 /HPF / WBC 3-5   Sq Epi: x / Non Sq Epi: Occasional / Bacteria: Few        RADIOLOGY & ADDITIONAL TESTS:

## 2020-07-29 NOTE — CHART NOTE - NSCHARTNOTEFT_GEN_A_CORE
CTAP displays pneumatosis and portal venous gas  Plan for diagnostic laparoscopy, possible laparotomy, possible bowel resection, possible ostomy, possible ventral and/or left inguinal hernia repair  Risks/benefits discussed with patient. He understands, agrees, and wishes to proceed. All questions answered.

## 2020-07-29 NOTE — PROGRESS NOTE ADULT - ASSESSMENT
69M with a PMHx chronic systolic CHF , s/p AICD/PPM, prostate ca s/p radical prostatectomy, urinary incontinence, HLD, HTN, insomnia, chronic pain syndrome from lumbar disc herniations who presents with a 1-week history of abdominal bloating, nausea, vomiting, and loose dark stools. Patient initially attributed his symptoms to food poising, however abdominal discomfort did not improve with conservative measures. His abdominal pain is rated as a 6/10 and is described as a "knot-like" feeling in his umbilicus. It is associated with decreased appetite. No fevers, chills, CP, or SOB. Multiple attempts to place Dye in ED - failed ,  consulted .   Multiple attempts on 7/27  to pass catheter.  Bedside cystoscopy was performed. There was a stricture of the bladder neck. The bladder neck was dilated up to 18fr and 16fr [place] catheter was placed. Dye placed by  on  7/27 . Pain better controlled . Passing gas , had few small loose stools since last night . NGT removed , started on clear liquid diet      Problem/Recommendation - 1:  Problem: Small bowel obstruction. Recommendation: NPO , IVF - as per primary team.     Problem/Recommendation - 2:  ·  Problem: Acute renal failure due to ATN 2/2 volume depletion   Recommendation: resolved   avoid nephrotoxic medications     Problem/Recommendation - 3:  ·  Problem: Urinary retention.  Recommendation: Hx of prostate cancer , c/o decreased urine output , dribbling of urine , unable to empty bladder  . Unable to pass catheter.  consulted . Bedside cystoscopy was performed. There was a stricture of the bladder neck. The bladder neck was dilated up to 18fr and 16fr [place] catheter was placed. Dye placed , Flomax started . TOV as per  .      Problem/Recommendation - 4:  ·  Problem: Essential hypertension.  Recommendation: BP on lower side , hold ACE/ARB's due to ARF , may restarted Enalapril in am if renal function stable ,  hold for SBP < 100. IV BB changed to Coreg , increase as patient tolerates to home dose 12.5 mg BID .      Problem/Recommendation - 5:  ·  Problem: Chronic diastolic congestive heart failure.  Recommendation: Hx of NICM with EF of 35%-45%  AICD/PPM - follows with Dr Jha - consulted , appreciated , IVF D/C to prevent fluid overload   Monitor noted with NSVT episodes , Trigeminy  - patient is asymptomatic , keep K> 4 , MAG > 2 - continue monitor .   Cardiology following . Consider to restart ASA 81 mg .      Problem/Recommendation - 6:  Problem: Need for prophylactic measure. Recommendation: Heparin SQ.  Will no longer follow up ,   please recall if needed , Dr Wadsworth will be on from tomorrow .

## 2020-07-29 NOTE — PROGRESS NOTE ADULT - SUBJECTIVE AND OBJECTIVE BOX
Patient seen and examined . Comfortable , NGT removed last night , on ivf , ice chips , small  flatus + , had 4 small loose stools since last night . No abdominal pain , no sob ,   no cp .     CC : abdominal pain resolved , loose stools     MEDICATIONS  (STANDING):  atorvastatin 40 milliGRAM(s) Oral at bedtime  carvedilol 3.125 milliGRAM(s) Oral every 12 hours  chlorhexidine 4% Liquid 1 Application(s) Topical <User Schedule>  heparin   Injectable 5000 Unit(s) SubCutaneous every 8 hours  multiple electrolytes Injection Type 1 1000 milliLiter(s) (75 mL/Hr) IV Continuous <Continuous>  nicotine -  14 mG/24Hr(s) Patch 1 patch Transdermal daily    MEDICATIONS  (PRN):      LABS:                          12.7   5.74  )-----------( 233      ( 2020 06:22 )             40.6     07-29    142  |  101  |  44.0<H>  ----------------------------<  83  3.7   |  28.0  |  1.25    Ca    8.1<L>      2020 06:05  Phos  2.0     -  Mg     2.9     -    TPro  x   /  Alb  3.3  /  TBili  x   /  DBili  x   /  AST  x   /  ALT  x   /  AlkPhos  x         Urinalysis Basic - ( 2020 15:24 )    Color: Yellow / Appearance: Clear / S.010 / pH: x  Gluc: x / Ketone: Small  / Bili: Negative / Urobili: Negative mg/dL   Blood: x / Protein: Negative mg/dL / Nitrite: Negative   Leuk Esterase: Negative / RBC: 25-50 /HPF / WBC 3-5   Sq Epi: x / Non Sq Epi: Occasional / Bacteria: Few    RADIOLOGY & ADDITIONAL TESTS:    < from: US Renal (20 @ 17:41) >     EXAM:  US KIDNEY(S)                          PROCEDURE DATE:  2020          INTERPRETATION:  CLINICAL INFORMATION: Elevated BUN/creatinine. Possible hydronephrosis    COMPARISON: CT scan of 2020    TECHNIQUE: Sonography of the kidneys .Portable technique is utilized    FINDINGS:    Right kidney:  10.8 cm. No renal mass, hydronephrosis or calculi. Small well-circumscribed hypodense upper pole exophytic lesion is incompletely characterized but likely represents a cyst measuring 9 x 9cm. Parapelvic cyst with septation is seen in the upper pole measure 1.4 x 1.1 x 1.4 cm. Tiny mid to lower pole cyst in the cortex measures 6 x 6 x 6 mm. Mid pole exophytic cyst measures 6 x 8 x 6 mm.    Left kidney:  10.7 cm. No renal mass, hydronephrosis or calculi. There are exophytic cysts in the midpole measuring 2.1 x 2.0 x 1.9 cm and 1.6 x 2.1 x 2.1 cm corresponding to the hyperdense lesions on the CAT scan. Small lower pole exophytic cyst measures 1.1 x 1.1 x 1.0 cm.    IMPRESSION:  Thereis no hydronephrosis  Small bilateral renal cysts      JAKE OWENS M.D.,ATTENDING RADIOLOGIST  This document has been electronically signed. 2020  5:59PM    < end of copied text >        REVIEW OF SYSTEMS:    As above     Vital Signs Last 24 Hrs  T(C): 36.9 (2020 07:50), Max: 36.9 (2020 20:35)  T(F): 98.4 (2020 07:50), Max: 98.4 (2020 20:35)  HR: 106 (2020 12:09) (75 - 106)  BP: 112/79 (2020 12:09) (103/69 - 113/69)  BP(mean): --  RR: 18 (2020 07:50) (18 - 18)  SpO2: 92% (2020 07:50) (92% - 97%)      PHYSICAL EXAM:    GENERAL: NAD, well-groomed, well-developed  HEAD:  Atraumatic, Normocephalic  EYES: EOMI, PERRLA, conjunctiva and sclera clear  NECK: Supple, No JVD, Normal thyroid  NERVOUS SYSTEM:  Alert & Oriented X3, no focal deficit  CHEST/LUNG: CTA b/l ,  no  rales, rhonchi, wheezing, or rubs  HEART: Regular rate and rhythm; No murmurs, rubs, or gallops  ABDOMEN: Soft, Nontender, Nondistended; Bowel sounds present  EXTREMITIES:  2+ Peripheral Pulses, No clubbing, cyanosis, or edema  LYMPH: No lymphadenopathy noted  SKIN: No rashes or lesions

## 2020-07-29 NOTE — PROGRESS NOTE ADULT - SUBJECTIVE AND OBJECTIVE BOX
INTERVAL HPI/OVERNIGHT EVENTS:  Patient was seen and examined at bedside and was found to be resting comfortably and in no acute distress. Patient denies any n/v, cp/sob, fevers, chills. KUB last evening showed contrast in the rectum, and so the decision was made to remove the NGT, much to the patient's relief. NGT was removed with no issues and patient was placed on NPO w/sips and ice chips, along with being restarted on his home medications. Patient doesn't have any acute complaints at this time. Dye is in place.    MEDICATIONS  (STANDING):  atorvastatin 40 milliGRAM(s) Oral at bedtime  carvedilol 3.125 milliGRAM(s) Oral every 12 hours  chlorhexidine 4% Liquid 1 Application(s) Topical <User Schedule>  heparin   Injectable 5000 Unit(s) SubCutaneous every 8 hours  nicotine -  14 mG/24Hr(s) Patch 1 patch Transdermal daily  sodium chloride 0.45% with potassium chloride 20 mEq/L 1000 milliLiter(s) (80 mL/Hr) IV Continuous <Continuous>    MEDICATIONS  (PRN):      Vital Signs Last 24 Hrs  T(C): 36.8 (2020 23:07), Max: 36.9 (2020 05:15)  T(F): 98.3 (2020 23:07), Max: 98.5 (2020 05:15)  HR: 93 (2020 23:07) (86 - 94)  BP: 104/70 (2020 23:07) (96/63 - 113/69)  BP(mean): --  RR: 18 (2020 23:07) (18 - 19)  SpO2: 97% (2020 23:07) (92% - 97%)    Physical Exam:  Gen: NAD  Respiratory: Breath Sounds equal & CTA bilaterally  Cardiovascular: Regular rate & rhythm, normal S1, S2  Gastrointestinal: Soft, non-tender, nondistended  Vascular: Equal and normal pulses: 2+ peripheral pulses throughout  Musculoskeletal: No joint pain, swelling or deformity; no limitation of movement      I&O's Detail    2020 07:01  -  2020 07:00  --------------------------------------------------------  IN:    multiple electrolytes Injection Type 1multiple electrolytes Injection Type 1: 900 mL  Total IN: 900 mL    OUT:    Indwelling Catheter - Urethral: 600 mL    Nasoenteral Tube: 800 mL  Total OUT: 1400 mL    Total NET: -500 mL      2020 07:01  -  2020 01:06  --------------------------------------------------------  IN:    sodium chloride 0.45% with potassium chloride 20 mEq/L: 400 mL    Solution: 200 mL  Total IN: 600 mL    OUT:    Indwelling Catheter - Urethral: 1700 mL    Nasoenteral Tube: 120 mL  Total OUT: 1820 mL    Total NET: -1220 mL          LABS:                        12.7   5.74  )-----------( 233      ( 2020 06:22 )             40.6         142  |  98  |  52.0<H>  ----------------------------<  95  4.5   |  30.0<H>  |  1.44<H>    Ca    8.6      2020 16:41  Phos  2.4       Mg     3.0         TPro  x   /  Alb  3.3  /  TBili  x   /  DBili  x   /  AST  x   /  ALT  x   /  AlkPhos  x         Urinalysis Basic - ( 2020 15:24 )    Color: Yellow / Appearance: Clear / S.010 / pH: x  Gluc: x / Ketone: Small  / Bili: Negative / Urobili: Negative mg/dL   Blood: x / Protein: Negative mg/dL / Nitrite: Negative   Leuk Esterase: Negative / RBC: 25-50 /HPF / WBC 3-5   Sq Epi: x / Non Sq Epi: Occasional / Bacteria: Few      Assessment:  Patient is a 68 yo M, extensive medical and cardiac history, presents with a high grade SBO. Patient also found to have a sigmoid containing left inguinal hernia and an ZANE on admission. No evidence of incarceration or strangulation. NGT removed last night.    Plan:  - NPO w/sips and chips  - ASIF  - monitor urine output  - no emergent surgical intervention required  - f/u repeat KUB later this morning ()

## 2020-07-29 NOTE — PROGRESS NOTE ADULT - SUBJECTIVE AND OBJECTIVE BOX
Vital Signs Last 24 Hrs,    T(C): 36.9 (2020 07:50), Max: 36.9 (2020 20:35)  T(F): 98.4 (2020 07:50), Max: 98.4 (2020 20:35)  HR: 106 (2020 12:09) (75 - 106)  BP: 112/79 (2020 12:09) (103/69 - 113/69)  RR: 18 (2020 07:50) (18 - 18)  SpO2: 92% (2020 07:50) (92% - 97%)    142    |  101    |  44.0<H>  ----------------------------<  83     Ca:8.1<L> (2020 06:05)  3.7     |  28.0   |  1.25     eGFR if : 68    TPro  x      /  Alb  3.3    /  TBili  x      /  DBili  x      /  AST  x      /  ALT  x      /  AlkPhos  x      2020 16:41                        12.7<L>  5.74  )-----------( 233      ( 2020 06:22 )             40.6     Phos:2.0 mg/dL<L> M.9 mg/dL<H>( @ 06:05)    Urinalysis Basic - ( 2020 15:24 )  Color: Yellow / Appearance: Clear / S.010 / pH: x  Gluc: x / Ketone: Small<!>  / Bili: Negative / Urobili: Negative mg/dL   Blood: x / Protein: Negative mg/dL / Nitrite: Negative   Leuk Esterase: Negative / RBC: 25-50 /HPF<!> / WBC 3-5   Sq Epi: x / Non Sq Epi: Occasional / Bacteria: Few<!>    UOsm:676 mosm/kg  ( @ 15:27)    Vital Signs Last 48  Hrs,    T(C): 36.9 (2020 08:10), Max: 37.1 (2020 23:26)  T(F): 98.4 (2020 08:10), Max: 98.8 (2020 23:26)  HR: 94 (2020 08:10) (89 - 98)  BP: 104/68 (2020 08:10) (96/63 - 104/68)  RR: 18 (2020 08:10) (18 - 18)  SpO2: 97% (2020 08:10) (93% - 100%)    Chief Complaint: Feels well,     24 hour events/subjective:    PAST HISTORY  --------------------------------------------------------------------------------  No significant changes to PMH, PSH, FHx, SHx, unless otherwise noted    ALLERGIES & MEDICATIONS  --------------------------------------------------------------------------------  Allergies    cucumber (Anaphylaxis; Hives; Urticaria; Short breath)  No Known Drug Allergies    Standing Inpatient Medications  chlorhexidine 4% Liquid 1 Application(s) Topical <User Schedule>  heparin   Injectable 5000 Unit(s) SubCutaneous every 8 hours  metoprolol tartrate Injectable 5 milliGRAM(s) IV Push every 6 hours  nicotine -  14 mG/24Hr(s) Patch 1 patch Transdermal daily  potassium chloride  20 mEq/100 mL IVPB 20 milliEquivalent(s) IV Intermittent every 2 hours  sodium chloride 0.45% with potassium chloride 20 mEq/L 1000 milliLiter(s) IV Continuous <Continuous>    PRN Inpatient Medications    REVIEW OF SYSTEMS  --------------------------------------------------------------------------------  Gen: No weight changes, fatigue, fevers/chills, weakness  Skin: No rashes  Head/Eyes/Ears/Mouth: No headache; Normal hearing; Normal vision w/o blurriness; No sinus pain/discomfort, sore throat  Respiratory: No dyspnea, cough, wheezing, hemoptysis  CV: No chest pain, PND, orthopnea  GI: No abdominal pain, diarrhea, constipation, nausea, vomiting, melena, hematochezia  : No increased frequency, dysuria, hematuria, nocturia  MSK: No joint pain/swelling; no back pain; no edema  Neuro: No dizziness/lightheadedness, weakness, seizures, numbness, tingling  Heme: No easy bruising or bleeding  Endo: No heat/cold intolerance  Psych: No significant nervousness, anxiety, stress, depression    All other systems were reviewed and are negative, except as noted.    VITALS/PHYSICAL EXAM  --------------------------------------------------------------------------------  T(C): 36.9 (20 @ 08:10), Max: 37.1 (20 @ 23:26)  HR: 94 (20 @ 08:10) (89 - 98)  BP: 104/68 (20 @ 08:10) (96/63 - 104/68)  RR: 18 (20 08:10) (18 - 18)  SpO2: 97% (20 @ 08:10) (93% - 100%)    Height (cm): 190.5 (20 @ 17:24)  Weight (kg): 97.5 (20 @ 17:24)    BMI (kg/m2): 26.9 (20 @ 17:24)  BSA (m2): 2.26 (20 @ 17:24)    20 @ 07:01  -  20 @ 07:00  --------------------------------------------------------  IN: 900 mL / OUT: 1400 mL / NET: -500 mL    Physical Exam:  	Gen: NAD, well-appearing  	HEENT: PERRL, supple neck, clear oropharynx  	Pulm: CTA B/L  	CV: RRR, S1S2; no rub  	Back: No spinal or CVA tenderness; no sacral edema  	Abd: +BS, soft, nontender/nondistended  	: No suprapubic tenderness  	UE: Warm, FROM, no clubbing, intact strength; no edema; no asterixis  	LE: Warm, FROM, no clubbing, intact strength; no edema  	Neuro: No focal deficits, intact gait  	Psych: Normal affect and mood  	Skin: Warm, without rashes  	Vascular access:    LABS/STUDIES  --------------------------------------------------------------------------------              12.7   5.74  >-----------<  233      [20 @ 06:22]              40.6     143  |  94  |  62.0  ----------------------------<  91      [20 @ 06:22]  3.2   |  33.0  |  1.90        Ca     8.5     [20 06:22]      Mg     3.0     [20 06:22]      Phos  4.1     [20 06:22]    TPro  7.7  /  Alb  4.0  /  TBili  0.4  /  DBili  x   /  AST  17  /  ALT  16  /  AlkPhos  92  [20 @ 18:57]    CK 60      [20 @ 06:22]    Creatinine Trend:  SCr 1.90 [ 06:22]  SCr 2.06 [ 22:40]  SCr 2.69 [ 07:57]  SCr 3.15 [07-26 @ 18:57]    Urinalysis - [20 @ 16:28]      Color Yellow / Appearance Clear / SG 1.020 / pH 5.0      Gluc Negative / Ketone Negative  / Bili Small / Urobili Negative       Blood Moderate / Protein 30 / Leuk Est Negative / Nitrite Negative      RBC 25-50 / WBC 3-5 / Hyaline  / Gran  / Sq Epi  / Non Sq Epi Occasional / Bacteria Few    Urine Creatinine 270      [20 @ 16:26]  Urine Protein 22.0      [20 @ 16:26]  Urine Sodium 38      [20 @ 16:26]  Urine Osmolality 635      [20 @ 16:26]    HCV 0.10, Nonreact      [20 @ 15:40]      DX : ATN    SBO    Rec :     KDIGO Care Bundle:    Avoidance of nephrotoxins/nephrotoxin medication adjustment  Medication dosage adjustment for kidney function  Continuous IVF administration   Discontinuation of ACE/ARBs for first 48 postoperative hours - as Applicable,   Close monitoring of serum Creatinine and U. O  Acid-base, electrolyte and albumin status management  Avoidance of hyperglycemia for first 72 postoperative hours  Consider alternatives to radiocontrast administration  Optimizing hemodynamics:    Replete K+, Mg++ :               69M, extensive medical and cardiac history, presents with a high grade SBO. Patient also found to have a sigmoid containing left inguinal hernia and an ZANE on admission. No evidence of incarceration or strangulation. NGT in place.     -NGT decompression IVF, NPO  -No emergent surgical intervention indicated  -Serial abdominal exams  -Monitor urine output

## 2020-07-29 NOTE — PROGRESS NOTE ADULT - ASSESSMENT
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 Dye in place     Problem/Plan - 1:    ·  Problem: Urinary retention.  Plan: Bladder neck (anastomotic stricture) To Maintain Dye,      Problem/Plan - 2:    ·  Problem: Prostate CA.  Plan: reportedly s/p RRP.     AK I Resolved,    Please call as needed, TY.

## 2020-07-30 LAB
ANION GAP SERPL CALC-SCNC: 14 MMOL/L — SIGNIFICANT CHANGE UP (ref 5–17)
ANION GAP SERPL CALC-SCNC: 14 MMOL/L — SIGNIFICANT CHANGE UP (ref 5–17)
ANISOCYTOSIS BLD QL: SLIGHT — SIGNIFICANT CHANGE UP
ANISOCYTOSIS BLD QL: SLIGHT — SIGNIFICANT CHANGE UP
BASOPHILS # BLD AUTO: 0 K/UL — SIGNIFICANT CHANGE UP (ref 0–0.2)
BASOPHILS # BLD AUTO: 0 K/UL — SIGNIFICANT CHANGE UP (ref 0–0.2)
BASOPHILS NFR BLD AUTO: 0 % — SIGNIFICANT CHANGE UP (ref 0–2)
BASOPHILS NFR BLD AUTO: 0 % — SIGNIFICANT CHANGE UP (ref 0–2)
BUN SERPL-MCNC: 29 MG/DL — HIGH (ref 8–20)
BUN SERPL-MCNC: 31 MG/DL — HIGH (ref 8–20)
BURR CELLS BLD QL SMEAR: PRESENT — SIGNIFICANT CHANGE UP
CALCIUM SERPL-MCNC: 7.1 MG/DL — LOW (ref 8.6–10.2)
CALCIUM SERPL-MCNC: 7.5 MG/DL — LOW (ref 8.6–10.2)
CHLORIDE SERPL-SCNC: 102 MMOL/L — SIGNIFICANT CHANGE UP (ref 98–107)
CHLORIDE SERPL-SCNC: 103 MMOL/L — SIGNIFICANT CHANGE UP (ref 98–107)
CO2 SERPL-SCNC: 26 MMOL/L — SIGNIFICANT CHANGE UP (ref 22–29)
CO2 SERPL-SCNC: 26 MMOL/L — SIGNIFICANT CHANGE UP (ref 22–29)
CREAT SERPL-MCNC: 1.4 MG/DL — HIGH (ref 0.5–1.3)
CREAT SERPL-MCNC: 1.47 MG/DL — HIGH (ref 0.5–1.3)
ELLIPTOCYTES BLD QL SMEAR: SLIGHT — SIGNIFICANT CHANGE UP
EOSINOPHIL # BLD AUTO: 0 K/UL — SIGNIFICANT CHANGE UP (ref 0–0.5)
EOSINOPHIL # BLD AUTO: 0.08 K/UL — SIGNIFICANT CHANGE UP (ref 0–0.5)
EOSINOPHIL NFR BLD AUTO: 0 % — SIGNIFICANT CHANGE UP (ref 0–6)
EOSINOPHIL NFR BLD AUTO: 0.9 % — SIGNIFICANT CHANGE UP (ref 0–6)
GIANT PLATELETS BLD QL SMEAR: PRESENT — SIGNIFICANT CHANGE UP
GLUCOSE SERPL-MCNC: 111 MG/DL — HIGH (ref 70–99)
GLUCOSE SERPL-MCNC: 126 MG/DL — HIGH (ref 70–99)
HCT VFR BLD CALC: 40 % — SIGNIFICANT CHANGE UP (ref 39–50)
HCT VFR BLD CALC: 42.9 % — SIGNIFICANT CHANGE UP (ref 39–50)
HGB BLD-MCNC: 12.2 G/DL — LOW (ref 13–17)
HGB BLD-MCNC: 13.3 G/DL — SIGNIFICANT CHANGE UP (ref 13–17)
LACTATE SERPL-SCNC: 1.3 MMOL/L — SIGNIFICANT CHANGE UP (ref 0.5–2)
LYMPHOCYTES # BLD AUTO: 0.37 K/UL — LOW (ref 1–3.3)
LYMPHOCYTES # BLD AUTO: 0.56 K/UL — LOW (ref 1–3.3)
LYMPHOCYTES # BLD AUTO: 4 % — LOW (ref 13–44)
LYMPHOCYTES # BLD AUTO: 6.1 % — LOW (ref 13–44)
MACROCYTES BLD QL: SLIGHT — SIGNIFICANT CHANGE UP
MAGNESIUM SERPL-MCNC: 2.4 MG/DL — SIGNIFICANT CHANGE UP (ref 1.8–2.6)
MAGNESIUM SERPL-MCNC: 2.6 MG/DL — SIGNIFICANT CHANGE UP (ref 1.6–2.6)
MANUAL SMEAR VERIFICATION: SIGNIFICANT CHANGE UP
MCHC RBC-ENTMCNC: 26.6 PG — LOW (ref 27–34)
MCHC RBC-ENTMCNC: 27 PG — SIGNIFICANT CHANGE UP (ref 27–34)
MCHC RBC-ENTMCNC: 30.5 GM/DL — LOW (ref 32–36)
MCHC RBC-ENTMCNC: 31 GM/DL — LOW (ref 32–36)
MCV RBC AUTO: 87.2 FL — SIGNIFICANT CHANGE UP (ref 80–100)
MCV RBC AUTO: 87.3 FL — SIGNIFICANT CHANGE UP (ref 80–100)
MICROCYTES BLD QL: SLIGHT — SIGNIFICANT CHANGE UP
MICROCYTES BLD QL: SLIGHT — SIGNIFICANT CHANGE UP
MONOCYTES # BLD AUTO: 0.64 K/UL — SIGNIFICANT CHANGE UP (ref 0–0.9)
MONOCYTES # BLD AUTO: 0.83 K/UL — SIGNIFICANT CHANGE UP (ref 0–0.9)
MONOCYTES NFR BLD AUTO: 7 % — SIGNIFICANT CHANGE UP (ref 2–14)
MONOCYTES NFR BLD AUTO: 9 % — SIGNIFICANT CHANGE UP (ref 2–14)
MYOGLOBIN UR-MCNC: 17 MCG/L — SIGNIFICANT CHANGE UP
NEUTROPHILS # BLD AUTO: 7.85 K/UL — HIGH (ref 1.8–7.4)
NEUTROPHILS # BLD AUTO: 7.89 K/UL — HIGH (ref 1.8–7.4)
NEUTROPHILS NFR BLD AUTO: 76 % — SIGNIFICANT CHANGE UP (ref 43–77)
NEUTROPHILS NFR BLD AUTO: 84.3 % — HIGH (ref 43–77)
NEUTS BAND # BLD: 1.7 % — SIGNIFICANT CHANGE UP (ref 0–8)
NEUTS BAND # BLD: 10 % — HIGH (ref 0–8)
NRBC # BLD: 0 /100 — SIGNIFICANT CHANGE UP (ref 0–0)
PHOSPHATE SERPL-MCNC: 3.3 MG/DL — SIGNIFICANT CHANGE UP (ref 2.4–4.7)
PHOSPHATE SERPL-MCNC: 4.3 MG/DL — SIGNIFICANT CHANGE UP (ref 2.4–4.7)
PLAT MORPH BLD: NORMAL — SIGNIFICANT CHANGE UP
PLAT MORPH BLD: NORMAL — SIGNIFICANT CHANGE UP
PLATELET # BLD AUTO: 238 K/UL — SIGNIFICANT CHANGE UP (ref 150–400)
PLATELET # BLD AUTO: 256 K/UL — SIGNIFICANT CHANGE UP (ref 150–400)
POIKILOCYTOSIS BLD QL AUTO: SLIGHT — SIGNIFICANT CHANGE UP
POIKILOCYTOSIS BLD QL AUTO: SLIGHT — SIGNIFICANT CHANGE UP
POLYCHROMASIA BLD QL SMEAR: SLIGHT — SIGNIFICANT CHANGE UP
POTASSIUM SERPL-MCNC: 4 MMOL/L — SIGNIFICANT CHANGE UP (ref 3.5–5.3)
POTASSIUM SERPL-MCNC: 4.2 MMOL/L — SIGNIFICANT CHANGE UP (ref 3.5–5.3)
POTASSIUM SERPL-SCNC: 4 MMOL/L — SIGNIFICANT CHANGE UP (ref 3.5–5.3)
POTASSIUM SERPL-SCNC: 4.2 MMOL/L — SIGNIFICANT CHANGE UP (ref 3.5–5.3)
RBC # BLD: 4.58 M/UL — SIGNIFICANT CHANGE UP (ref 4.2–5.8)
RBC # BLD: 4.92 M/UL — SIGNIFICANT CHANGE UP (ref 4.2–5.8)
RBC # FLD: 14.8 % — HIGH (ref 10.3–14.5)
RBC # FLD: 14.9 % — HIGH (ref 10.3–14.5)
RBC BLD AUTO: ABNORMAL
RBC BLD AUTO: SIGNIFICANT CHANGE UP
SODIUM SERPL-SCNC: 142 MMOL/L — SIGNIFICANT CHANGE UP (ref 135–145)
SODIUM SERPL-SCNC: 143 MMOL/L — SIGNIFICANT CHANGE UP (ref 135–145)
VARIANT LYMPHS # BLD: 1 % — SIGNIFICANT CHANGE UP (ref 0–6)
WBC # BLD: 9.13 K/UL — SIGNIFICANT CHANGE UP (ref 3.8–10.5)
WBC # BLD: 9.17 K/UL — SIGNIFICANT CHANGE UP (ref 3.8–10.5)
WBC # FLD AUTO: 9.13 K/UL — SIGNIFICANT CHANGE UP (ref 3.8–10.5)
WBC # FLD AUTO: 9.17 K/UL — SIGNIFICANT CHANGE UP (ref 3.8–10.5)

## 2020-07-30 PROCEDURE — 99024 POSTOP FOLLOW-UP VISIT: CPT

## 2020-07-30 RX ORDER — FENTANYL CITRATE 50 UG/ML
25 INJECTION INTRAVENOUS
Refills: 0 | Status: DISCONTINUED | OUTPATIENT
Start: 2020-07-30 | End: 2020-07-30

## 2020-07-30 RX ORDER — CARVEDILOL PHOSPHATE 80 MG/1
3.12 CAPSULE, EXTENDED RELEASE ORAL EVERY 12 HOURS
Refills: 0 | Status: DISCONTINUED | OUTPATIENT
Start: 2020-07-30 | End: 2020-07-30

## 2020-07-30 RX ORDER — ACETAMINOPHEN 500 MG
1000 TABLET ORAL ONCE
Refills: 0 | Status: COMPLETED | OUTPATIENT
Start: 2020-07-31 | End: 2020-07-31

## 2020-07-30 RX ORDER — ACETAMINOPHEN 500 MG
1000 TABLET ORAL ONCE
Refills: 0 | Status: COMPLETED | OUTPATIENT
Start: 2020-07-30 | End: 2020-07-30

## 2020-07-30 RX ORDER — NICOTINE POLACRILEX 2 MG
1 GUM BUCCAL DAILY
Refills: 0 | Status: DISCONTINUED | OUTPATIENT
Start: 2020-07-30 | End: 2020-08-05

## 2020-07-30 RX ORDER — HYDROMORPHONE HYDROCHLORIDE 2 MG/ML
0.5 INJECTION INTRAMUSCULAR; INTRAVENOUS; SUBCUTANEOUS
Refills: 0 | Status: DISCONTINUED | OUTPATIENT
Start: 2020-07-30 | End: 2020-07-30

## 2020-07-30 RX ORDER — SODIUM CHLORIDE 9 MG/ML
1000 INJECTION, SOLUTION INTRAVENOUS
Refills: 0 | Status: DISCONTINUED | OUTPATIENT
Start: 2020-07-30 | End: 2020-07-30

## 2020-07-30 RX ORDER — METOPROLOL TARTRATE 50 MG
5 TABLET ORAL EVERY 6 HOURS
Refills: 0 | Status: DISCONTINUED | OUTPATIENT
Start: 2020-07-30 | End: 2020-08-05

## 2020-07-30 RX ORDER — CHLORHEXIDINE GLUCONATE 213 G/1000ML
1 SOLUTION TOPICAL
Refills: 0 | Status: DISCONTINUED | OUTPATIENT
Start: 2020-07-30 | End: 2020-08-05

## 2020-07-30 RX ORDER — HEPARIN SODIUM 5000 [USP'U]/ML
5000 INJECTION INTRAVENOUS; SUBCUTANEOUS EVERY 8 HOURS
Refills: 0 | Status: DISCONTINUED | OUTPATIENT
Start: 2020-07-30 | End: 2020-08-05

## 2020-07-30 RX ORDER — ATORVASTATIN CALCIUM 80 MG/1
40 TABLET, FILM COATED ORAL AT BEDTIME
Refills: 0 | Status: DISCONTINUED | OUTPATIENT
Start: 2020-07-30 | End: 2020-08-05

## 2020-07-30 RX ORDER — FENTANYL CITRATE 50 UG/ML
50 INJECTION INTRAVENOUS
Refills: 0 | Status: DISCONTINUED | OUTPATIENT
Start: 2020-07-30 | End: 2020-07-30

## 2020-07-30 RX ORDER — SODIUM CHLORIDE 9 MG/ML
500 INJECTION INTRAMUSCULAR; INTRAVENOUS; SUBCUTANEOUS ONCE
Refills: 0 | Status: COMPLETED | OUTPATIENT
Start: 2020-07-30 | End: 2020-07-30

## 2020-07-30 RX ORDER — SODIUM CHLORIDE 9 MG/ML
1000 INJECTION, SOLUTION INTRAVENOUS
Refills: 0 | Status: DISCONTINUED | OUTPATIENT
Start: 2020-07-30 | End: 2020-07-31

## 2020-07-30 RX ORDER — CARVEDILOL PHOSPHATE 80 MG/1
3.12 CAPSULE, EXTENDED RELEASE ORAL EVERY 12 HOURS
Refills: 0 | Status: DISCONTINUED | OUTPATIENT
Start: 2020-07-30 | End: 2020-08-04

## 2020-07-30 RX ORDER — METOCLOPRAMIDE HCL 10 MG
10 TABLET ORAL ONCE
Refills: 0 | Status: DISCONTINUED | OUTPATIENT
Start: 2020-07-30 | End: 2020-07-30

## 2020-07-30 RX ORDER — ONDANSETRON 8 MG/1
4 TABLET, FILM COATED ORAL ONCE
Refills: 0 | Status: DISCONTINUED | OUTPATIENT
Start: 2020-07-30 | End: 2020-07-30

## 2020-07-30 RX ADMIN — Medication 1 PATCH: at 11:09

## 2020-07-30 RX ADMIN — Medication 1 PATCH: at 12:56

## 2020-07-30 RX ADMIN — HYDROMORPHONE HYDROCHLORIDE 30 MILLILITER(S): 2 INJECTION INTRAMUSCULAR; INTRAVENOUS; SUBCUTANEOUS at 19:42

## 2020-07-30 RX ADMIN — SODIUM CHLORIDE 75 MILLILITER(S): 9 INJECTION, SOLUTION INTRAVENOUS at 19:50

## 2020-07-30 RX ADMIN — Medication 1 PATCH: at 07:04

## 2020-07-30 RX ADMIN — HYDROMORPHONE HYDROCHLORIDE 0.5 MILLIGRAM(S): 2 INJECTION INTRAMUSCULAR; INTRAVENOUS; SUBCUTANEOUS at 01:23

## 2020-07-30 RX ADMIN — Medication 1 PATCH: at 19:51

## 2020-07-30 RX ADMIN — HYDROMORPHONE HYDROCHLORIDE 0.5 MILLIGRAM(S): 2 INJECTION INTRAMUSCULAR; INTRAVENOUS; SUBCUTANEOUS at 02:51

## 2020-07-30 RX ADMIN — HYDROMORPHONE HYDROCHLORIDE 30 MILLILITER(S): 2 INJECTION INTRAMUSCULAR; INTRAVENOUS; SUBCUTANEOUS at 01:34

## 2020-07-30 RX ADMIN — Medication 5 MILLIGRAM(S): at 23:34

## 2020-07-30 RX ADMIN — Medication 1000 MILLIGRAM(S): at 19:43

## 2020-07-30 RX ADMIN — SODIUM CHLORIDE 75 MILLILITER(S): 9 INJECTION, SOLUTION INTRAVENOUS at 04:41

## 2020-07-30 RX ADMIN — HYDROMORPHONE HYDROCHLORIDE 0.5 MILLIGRAM(S): 2 INJECTION INTRAMUSCULAR; INTRAVENOUS; SUBCUTANEOUS at 02:41

## 2020-07-30 RX ADMIN — HEPARIN SODIUM 5000 UNIT(S): 5000 INJECTION INTRAVENOUS; SUBCUTANEOUS at 15:24

## 2020-07-30 RX ADMIN — HYDROMORPHONE HYDROCHLORIDE 0.5 MILLIGRAM(S): 2 INJECTION INTRAMUSCULAR; INTRAVENOUS; SUBCUTANEOUS at 01:13

## 2020-07-30 RX ADMIN — Medication 1000 MILLIGRAM(S): at 23:46

## 2020-07-30 RX ADMIN — HYDROMORPHONE HYDROCHLORIDE 0.5 MILLIGRAM(S): 2 INJECTION INTRAMUSCULAR; INTRAVENOUS; SUBCUTANEOUS at 02:06

## 2020-07-30 RX ADMIN — SODIUM CHLORIDE 500 MILLILITER(S): 9 INJECTION INTRAMUSCULAR; INTRAVENOUS; SUBCUTANEOUS at 16:28

## 2020-07-30 RX ADMIN — HEPARIN SODIUM 5000 UNIT(S): 5000 INJECTION INTRAVENOUS; SUBCUTANEOUS at 05:30

## 2020-07-30 RX ADMIN — HYDROMORPHONE HYDROCHLORIDE 30 MILLILITER(S): 2 INJECTION INTRAMUSCULAR; INTRAVENOUS; SUBCUTANEOUS at 04:08

## 2020-07-30 RX ADMIN — Medication 1000 MILLIGRAM(S): at 12:41

## 2020-07-30 RX ADMIN — HEPARIN SODIUM 5000 UNIT(S): 5000 INJECTION INTRAVENOUS; SUBCUTANEOUS at 22:42

## 2020-07-30 RX ADMIN — Medication 400 MILLIGRAM(S): at 11:57

## 2020-07-30 RX ADMIN — HYDROMORPHONE HYDROCHLORIDE 0.5 MILLIGRAM(S): 2 INJECTION INTRAMUSCULAR; INTRAVENOUS; SUBCUTANEOUS at 02:16

## 2020-07-30 RX ADMIN — Medication 400 MILLIGRAM(S): at 23:34

## 2020-07-30 RX ADMIN — Medication 400 MILLIGRAM(S): at 17:49

## 2020-07-30 NOTE — PROGRESS NOTE ADULT - ASSESSMENT
Patient is a 70 yo M admitted for high grade SBO who is now POD 0 from laparoscopic converted to open exploratory laparotomy with SBR and extensive ANT. Procedure was well tolerated.     Plan:  - NGT in place  - NPO/IVF  - ASIF  - Monitor bowel function  - monitor urine output

## 2020-07-30 NOTE — BRIEF OPERATIVE NOTE - OPERATION/FINDINGS
Laparoscopic converted to open exploratory laparotomy with extensive lysis of adhesions with small bowel resection. Enterotomy secondary to bowel stuck on peritoneal mesh. No necrotic bowel. Hemostasis obtained.

## 2020-07-30 NOTE — PROGRESS NOTE ADULT - ASSESSMENT
Urinary retention.  Now with verma s/p stent placement    1.  Flomax when taking po  2.  verma to gravity  3.  will follow up in a few days  4.  Continue the verma until approaching discharge and when ambulating

## 2020-07-30 NOTE — PROGRESS NOTE ADULT - SUBJECTIVE AND OBJECTIVE BOX
HPI/OVERNIGHT EVENTS: Patient seen and examined at bedside this AM. Overnight was taken to the OR for exploratory laparotomy with SBR and extensive ANT which he tolerated. No active complaints. Pain under control with PCA. Denies fever, chills, nausea, vomiting, chest pain, and sob.  Denies fever, chills, nausea, vomiting, chest pain, SOB, dizziness, abd pain or any other concerning symptoms    Vital Signs Last 24 Hrs  T(C): 36.9 (2020 04:31), Max: 36.9 (2020 07:50)  T(F): 98.4 (2020 04:31), Max: 98.4 (2020 07:50)  HR: 106 (2020 04:31) (83 - 106)  BP: 107/73 (2020 04:31) (100/65 - 118/75)  BP(mean): 76 (2020 03:30) (72 - 78)  RR: 19 (:31) (14 - 25)  SpO2: 92% (2020 04:31) (92% - 100%)    I&O's Detail    2020 07:01  -  2020 07:00  --------------------------------------------------------  IN:    sodium chloride 0.45% with potassium chloride 20 mEq/L: 1440 mL    Solution: 200 mL  Total IN: 1640 mL    OUT:    Indwelling Catheter - Urethral: 2200 mL    Nasoenteral Tube: 120 mL  Total OUT: 2320 mL    Total NET: -680 mL      2020 07:01  -  2020 05:15  --------------------------------------------------------  IN:    lactated ringers.: 150 mL  Total IN: 150 mL    OUT:    Indwelling Catheter - Urethral: 1150 mL    Nasoenteral Tube: 1805 mL  Total OUT: 2955 mL    Total NET: -2805 mL      Constitutional: patient resting comfortably in bed, in no acute distress  HEENT: EOMI, PERRLA, MMM. NGT in place with bilious output.   Respiratory: Non labored breathing on RA  Cardiovascular: RRR  Gastrointestinal: Abdomen soft, non-tender, non-distended, no rebound tenderness / guarding  Musculoskeletal: No joint pain, swelling or deformity; no limitation of movement    LABS:                        13.1   6.59  )-----------( 247      ( 2020 19:50 )             41.8         143  |  101  |  33.0<H>  ----------------------------<  106<H>  3.9   |  28.0  |  1.12    Ca    8.1<L>      2020 19:50  Phos  2.0       Mg     2.9         TPro  x   /  Alb  3.3  /  TBili  x   /  DBili  x   /  AST  x   /  ALT  x   /  AlkPhos  x       PT/INR - ( 2020 19:51 )   PT: 13.8 sec;   INR: 1.20 ratio         PTT - ( 2020 19:51 )  PTT:28.1 sec  Urinalysis Basic - ( 2020 15:24 )    Color: Yellow / Appearance: Clear / S.010 / pH: x  Gluc: x / Ketone: Small  / Bili: Negative / Urobili: Negative mg/dL   Blood: x / Protein: Negative mg/dL / Nitrite: Negative   Leuk Esterase: Negative / RBC: 25-50 /HPF / WBC 3-5   Sq Epi: x / Non Sq Epi: Occasional / Bacteria: Few        MEDICATIONS  (STANDING):  atorvastatin 40 milliGRAM(s) Oral at bedtime  carvedilol 3.125 milliGRAM(s) Oral every 12 hours  chlorhexidine 4% Liquid 1 Application(s) Topical <User Schedule>  heparin   Injectable 5000 Unit(s) SubCutaneous every 8 hours  HYDROmorphone PCA (1 mG/mL) 30 milliLiter(s) PCA Continuous PCA Continuous  multiple electrolytes Injection Type 1 1000 milliLiter(s) (75 mL/Hr) IV Continuous <Continuous>  nicotine -  14 mG/24Hr(s) Patch 1 patch Transdermal daily    MEDICATIONS  (PRN):  naloxone Injectable 0.1 milliGRAM(s) IV Push every 3 minutes PRN For ANY of the following changes in patient status:  A. RR LESS THAN 10 breaths per minute, B. Oxygen saturation LESS THAN 90%, C. Sedation score of 6  ondansetron Injectable 4 milliGRAM(s) IV Push every 6 hours PRN Nausea

## 2020-07-30 NOTE — PROGRESS NOTE ADULT - SUBJECTIVE AND OBJECTIVE BOX
INTERVAL HPI/OVERNIGHT EVENTS:  Dye was placed one day ago and he has mild penile discomfort.    MEDICATIONS  (STANDING):  acetaminophen  IVPB .. 1000 milliGRAM(s) IV Intermittent once  acetaminophen  IVPB .. 1000 milliGRAM(s) IV Intermittent once  atorvastatin 40 milliGRAM(s) Oral at bedtime  carvedilol 3.125 milliGRAM(s) Oral every 12 hours  chlorhexidine 4% Liquid 1 Application(s) Topical <User Schedule>  heparin   Injectable 5000 Unit(s) SubCutaneous every 8 hours  HYDROmorphone PCA (1 mG/mL) 30 milliLiter(s) PCA Continuous PCA Continuous  metoprolol tartrate Injectable 5 milliGRAM(s) IV Push every 6 hours  multiple electrolytes Injection Type 1 1000 milliLiter(s) (75 mL/Hr) IV Continuous <Continuous>  nicotine -  14 mG/24Hr(s) Patch 1 patch Transdermal daily    MEDICATIONS  (PRN):  naloxone Injectable 0.1 milliGRAM(s) IV Push every 3 minutes PRN For ANY of the following changes in patient status:  A. RR LESS THAN 10 breaths per minute, B. Oxygen saturation LESS THAN 90%, C. Sedation score of 6  ondansetron Injectable 4 milliGRAM(s) IV Push every 6 hours PRN Nausea      Allergies    cucumber (Anaphylaxis; Hives; Urticaria; Short breath)  No Known Drug Allergies    Intolerances        Vital Signs Last 24 Hrs  T(C): 37.2 (2020 12:24), Max: 37.2 (2020 12:24)  T(F): 99 (2020 12:24), Max: 99 (2020 12:24)  HR: 113 (2020 12:24) (83 - 113)  BP: 94/68 (2020 12:24) (94/68 - 118/75)  BP(mean): 76 (2020 03:30) (72 - 78)  RR: 18 (2020 12:24) (14 - 25)  SpO2: 94% (2020 12:24) (92% - 100%)     ON PE:  General: alert and awake  Abdomen:  Mild distention  :  Dye draining clear yellow urine    LABS:                        13.3   9.17  )-----------( 256      ( 2020 09:53 )             42.9     07-30    142  |  102  |  29.0<H>  ----------------------------<  126<H>  4.2   |  26.0  |  1.40<H>    Ca    7.5<L>      2020 09:53  Phos  4.3       Mg     2.4         TPro  x   /  Alb  3.3  /  TBili  x   /  DBili  x   /  AST  x   /  ALT  x   /  AlkPhos  x       PT/INR - ( 2020 19:51 )   PT: 13.8 sec;   INR: 1.20 ratio         PTT - ( 2020 19:51 )  PTT:28.1 sec  Urinalysis Basic - ( 2020 15:24 )    Color: Yellow / Appearance: Clear / S.010 / pH: x  Gluc: x / Ketone: Small  / Bili: Negative / Urobili: Negative mg/dL   Blood: x / Protein: Negative mg/dL / Nitrite: Negative   Leuk Esterase: Negative / RBC: 25-50 /HPF / WBC 3-5   Sq Epi: x / Non Sq Epi: Occasional / Bacteria: Few        RADIOLOGY & ADDITIONAL TESTS:

## 2020-07-30 NOTE — BRIEF OPERATIVE NOTE - ANTIBIOTIC PROTOCOL
Nneka Buck Patient Age: 48 year old  MESSAGE:         Caller declined making an appointment before speaking with the nurse.  Informed patient that after speaking with the nurse, if it is determined an appointment is needed you will be connected back the  to make the appointment.    Patient states she has been having vaginal discharge and Dr usually prescribes medication. Patient states she is unable to make an apt and is going out of town, would like an rx. Patient would like to talk with a nurse. Message confirmed with caller.    Sending to Dr Yessica Junior's clinical support pool.         WEIGHT AND HEIGHT:   Wt Readings from Last 1 Encounters:   07/24/19 90.7 kg (200 lb)     Ht Readings from Last 1 Encounters:   07/24/19 5' 3\" (1.6 m)     BMI Readings from Last 1 Encounters:   07/24/19 35.43 kg/m²       ALLERGIES:  Altace; Amoxicillin; Cherry; and Naproxen  Current Outpatient Medications   Medication   • valACYclovir (VALTREX) 500 MG tablet   • atenolol (TENORMIN) 100 MG tablet   • fluticasone (FLONASE) 50 MCG/ACT nasal spray   • meclizine HCl (ANTIVERT) 25 MG tablet   • clindamycin (CLEOCIN T) 1 % lotion   • spironolactone (ALDACTONE) 50 MG tablet   • hydrochlorothiazide (HYDRODIURIL) 25 MG tablet   • omeprazole (PRILOSEC) 20 MG capsule     No current facility-administered medications for this visit.      PHARMACY to use: shown below         Pharmacy preference(s) on file[facility]   [id number] [place] [address] AT [facility] [address]  [address]  [facility] [address]  Phone: [phone] Fax: [phone]      CALL BACK INFO: Ok to leave response (including medical information) with family member or on answering machine  ROUTING: Patient's physician/staff        PCP: Yessica Junior MD         INS: Payor: BLUE [facility] / Plan: PPO [id number] / Product Type: PPO MISC   PATIENT ADDRESS:  [address] [place] [place] [id number]     Followed protocol

## 2020-07-31 DIAGNOSIS — Z79.891 LONG TERM (CURRENT) USE OF OPIATE ANALGESIC: ICD-10-CM

## 2020-07-31 DIAGNOSIS — G89.18 OTHER ACUTE POSTPROCEDURAL PAIN: ICD-10-CM

## 2020-07-31 DIAGNOSIS — M54.5 LOW BACK PAIN: ICD-10-CM

## 2020-07-31 DIAGNOSIS — R52 PAIN, UNSPECIFIED: ICD-10-CM

## 2020-07-31 LAB
ANION GAP SERPL CALC-SCNC: 15 MMOL/L — SIGNIFICANT CHANGE UP (ref 5–17)
BASOPHILS # BLD AUTO: 0.04 K/UL — SIGNIFICANT CHANGE UP (ref 0–0.2)
BASOPHILS NFR BLD AUTO: 0.4 % — SIGNIFICANT CHANGE UP (ref 0–2)
BUN SERPL-MCNC: 29 MG/DL — HIGH (ref 8–20)
CALCIUM SERPL-MCNC: 7.2 MG/DL — LOW (ref 8.6–10.2)
CHLORIDE SERPL-SCNC: 104 MMOL/L — SIGNIFICANT CHANGE UP (ref 98–107)
CO2 SERPL-SCNC: 27 MMOL/L — SIGNIFICANT CHANGE UP (ref 22–29)
CREAT SERPL-MCNC: 1.36 MG/DL — HIGH (ref 0.5–1.3)
EOSINOPHIL # BLD AUTO: 0.12 K/UL — SIGNIFICANT CHANGE UP (ref 0–0.5)
EOSINOPHIL NFR BLD AUTO: 1.3 % — SIGNIFICANT CHANGE UP (ref 0–6)
GLUCOSE SERPL-MCNC: 99 MG/DL — SIGNIFICANT CHANGE UP (ref 70–99)
HCT VFR BLD CALC: 39.7 % — SIGNIFICANT CHANGE UP (ref 39–50)
HGB BLD-MCNC: 12.2 G/DL — LOW (ref 13–17)
IMM GRANULOCYTES NFR BLD AUTO: 1.3 % — SIGNIFICANT CHANGE UP (ref 0–1.5)
LYMPHOCYTES # BLD AUTO: 0.96 K/UL — LOW (ref 1–3.3)
LYMPHOCYTES # BLD AUTO: 10.7 % — LOW (ref 13–44)
MAGNESIUM SERPL-MCNC: 2.7 MG/DL — HIGH (ref 1.6–2.6)
MCHC RBC-ENTMCNC: 27.1 PG — SIGNIFICANT CHANGE UP (ref 27–34)
MCHC RBC-ENTMCNC: 30.7 GM/DL — LOW (ref 32–36)
MCV RBC AUTO: 88 FL — SIGNIFICANT CHANGE UP (ref 80–100)
MONOCYTES # BLD AUTO: 0.73 K/UL — SIGNIFICANT CHANGE UP (ref 0–0.9)
MONOCYTES NFR BLD AUTO: 8.1 % — SIGNIFICANT CHANGE UP (ref 2–14)
NEUTROPHILS # BLD AUTO: 6.99 K/UL — SIGNIFICANT CHANGE UP (ref 1.8–7.4)
NEUTROPHILS NFR BLD AUTO: 78.2 % — HIGH (ref 43–77)
PHOSPHATE SERPL-MCNC: 2.5 MG/DL — SIGNIFICANT CHANGE UP (ref 2.4–4.7)
PLATELET # BLD AUTO: 237 K/UL — SIGNIFICANT CHANGE UP (ref 150–400)
POTASSIUM SERPL-MCNC: 3.8 MMOL/L — SIGNIFICANT CHANGE UP (ref 3.5–5.3)
POTASSIUM SERPL-SCNC: 3.8 MMOL/L — SIGNIFICANT CHANGE UP (ref 3.5–5.3)
RBC # BLD: 4.51 M/UL — SIGNIFICANT CHANGE UP (ref 4.2–5.8)
RBC # FLD: 15 % — HIGH (ref 10.3–14.5)
SODIUM SERPL-SCNC: 146 MMOL/L — HIGH (ref 135–145)
WBC # BLD: 8.96 K/UL — SIGNIFICANT CHANGE UP (ref 3.8–10.5)
WBC # FLD AUTO: 8.96 K/UL — SIGNIFICANT CHANGE UP (ref 3.8–10.5)

## 2020-07-31 PROCEDURE — 99024 POSTOP FOLLOW-UP VISIT: CPT

## 2020-07-31 RX ORDER — SODIUM CHLORIDE 9 MG/ML
1000 INJECTION, SOLUTION INTRAVENOUS
Refills: 0 | Status: DISCONTINUED | OUTPATIENT
Start: 2020-07-31 | End: 2020-08-03

## 2020-07-31 RX ORDER — POTASSIUM CHLORIDE 20 MEQ
20 PACKET (EA) ORAL ONCE
Refills: 0 | Status: COMPLETED | OUTPATIENT
Start: 2020-07-31 | End: 2020-07-31

## 2020-07-31 RX ORDER — SODIUM CHLORIDE 9 MG/ML
250 INJECTION, SOLUTION INTRAVENOUS ONCE
Refills: 0 | Status: COMPLETED | OUTPATIENT
Start: 2020-07-31 | End: 2020-07-31

## 2020-07-31 RX ORDER — POTASSIUM PHOSPHATE, MONOBASIC POTASSIUM PHOSPHATE, DIBASIC 236; 224 MG/ML; MG/ML
15 INJECTION, SOLUTION INTRAVENOUS ONCE
Refills: 0 | Status: COMPLETED | OUTPATIENT
Start: 2020-07-31 | End: 2020-07-31

## 2020-07-31 RX ADMIN — Medication 50 MILLIEQUIVALENT(S): at 11:57

## 2020-07-31 RX ADMIN — Medication 400 MILLIGRAM(S): at 11:57

## 2020-07-31 RX ADMIN — HEPARIN SODIUM 5000 UNIT(S): 5000 INJECTION INTRAVENOUS; SUBCUTANEOUS at 22:35

## 2020-07-31 RX ADMIN — CHLORHEXIDINE GLUCONATE 1 APPLICATION(S): 213 SOLUTION TOPICAL at 05:52

## 2020-07-31 RX ADMIN — Medication 1 PATCH: at 11:57

## 2020-07-31 RX ADMIN — HYDROMORPHONE HYDROCHLORIDE 30 MILLILITER(S): 2 INJECTION INTRAMUSCULAR; INTRAVENOUS; SUBCUTANEOUS at 19:36

## 2020-07-31 RX ADMIN — HEPARIN SODIUM 5000 UNIT(S): 5000 INJECTION INTRAVENOUS; SUBCUTANEOUS at 05:51

## 2020-07-31 RX ADMIN — POTASSIUM PHOSPHATE, MONOBASIC POTASSIUM PHOSPHATE, DIBASIC 62.5 MILLIMOLE(S): 236; 224 INJECTION, SOLUTION INTRAVENOUS at 10:48

## 2020-07-31 RX ADMIN — Medication 5 MILLIGRAM(S): at 11:57

## 2020-07-31 RX ADMIN — Medication 1 PATCH: at 18:04

## 2020-07-31 RX ADMIN — Medication 5 MILLIGRAM(S): at 17:10

## 2020-07-31 RX ADMIN — HYDROMORPHONE HYDROCHLORIDE 30 MILLILITER(S): 2 INJECTION INTRAMUSCULAR; INTRAVENOUS; SUBCUTANEOUS at 07:39

## 2020-07-31 RX ADMIN — Medication 5 MILLIGRAM(S): at 23:33

## 2020-07-31 RX ADMIN — Medication 1000 MILLIGRAM(S): at 06:10

## 2020-07-31 RX ADMIN — Medication 1 PATCH: at 11:45

## 2020-07-31 RX ADMIN — SODIUM CHLORIDE 75 MILLILITER(S): 9 INJECTION, SOLUTION INTRAVENOUS at 17:10

## 2020-07-31 RX ADMIN — SODIUM CHLORIDE 250 MILLILITER(S): 9 INJECTION, SOLUTION INTRAVENOUS at 02:22

## 2020-07-31 RX ADMIN — Medication 400 MILLIGRAM(S): at 05:51

## 2020-07-31 RX ADMIN — Medication 1000 MILLIGRAM(S): at 12:10

## 2020-07-31 RX ADMIN — HEPARIN SODIUM 5000 UNIT(S): 5000 INJECTION INTRAVENOUS; SUBCUTANEOUS at 11:58

## 2020-07-31 NOTE — CONSULT NOTE ADULT - PROBLEM SELECTOR RECOMMENDATION 3
Hx of prostate cancer , c/o decreased urine output , dribbling of urine , unable to empty bladder    Unable to pass catheter.  consulted . Bedside cystoscopy was performed. There was a stricture of the bladder neck. The bladder neck was dilated up to 18fr and 16fr [place] catheter was placed.
1. Presently well managed

## 2020-07-31 NOTE — PROGRESS NOTE ADULT - ASSESSMENT
68 y/o M presented with Small bowel obstruction s/p Lap converted to open ANT Enterotomy and with small bowel resection. Hemodynamically stable and pain well controlled.    -Continue NGT and NPO until return of bowel function   -Replete NGT output, monitor I & O   - Patient currently on PCA will reduce narcotics  -Out of bed to chair   -Encourage IS   - Maintain verma for strict I & O

## 2020-07-31 NOTE — CONSULT NOTE ADULT - SUBJECTIVE AND OBJECTIVE BOX
VERBAL REPORT: "It's working very well."  The patient describes incisional pain, well relieved by use of IV PCA demand dose. He presently denies additional pain complaint. He admits sufficient analgesia to get up OOB to chair if needed. He plans on doing so later on once his wife arrives.  PAIN SCORE: 3-4/10                  SCALE USED: VNRS    Allergies  cucumber (Anaphylaxis; Hives; Urticaria; Short breath)  No Known Drug Allergies    PAST MEDICAL & SURGICAL HISTORY:  Prostate CA  Weak heart  Insomnia  HTN (hypertension)  S/P right inguinal herniorrhaphy  History of back surgery  ICD (implantable cardioverter-defibrillator) in place  Abdominal hernia      MEDICATIONS  (STANDING):  acetaminophen  IVPB .. 1000 milliGRAM(s) IV Intermittent once  atorvastatin 40 milliGRAM(s) Oral at bedtime  carvedilol 3.125 milliGRAM(s) Oral every 12 hours  chlorhexidine 4% Liquid 1 Application(s) Topical <User Schedule>  heparin   Injectable 5000 Unit(s) SubCutaneous every 8 hours  HYDROmorphone PCA (1 mG/mL) 30 milliLiter(s) PCA Continuous PCA Continuous  metoprolol tartrate Injectable 5 milliGRAM(s) IV Push every 6 hours  multiple electrolytes Injection Type 1 1000 milliLiter(s) (75 mL/Hr) IV Continuous <Continuous>  nicotine -  14 mG/24Hr(s) Patch 1 patch Transdermal daily    MEDICATIONS  (PRN):  naloxone Injectable 0.1 milliGRAM(s) IV Push every 3 minutes PRN For ANY of the following changes in patient status:  A. RR LESS THAN 10 breaths per minute, B. Oxygen saturation LESS THAN 90%, C. Sedation score of 6  ondansetron Injectable 4 milliGRAM(s) IV Push every 6 hours PRN Nausea      PHYSICAL EXAM:  GENERAL: NAD, well-developed  HEAD:  Atraumatic, Normocephalic  EYES: EOMI, PERRLA, conjunctiva and sclera clear  NERVOUS SYSTEM:  Alert & Oriented X3, Good concentration; Motor Strength 5/5 B/L upper and lower extremities  CHEST/LUNG: Clear speech, no SOB evident at rest.  ABDOMEN: Incisional tenderness; NG Tube present  EXTREMITIES: No clubbing, cyanosis, or edema  LYMPH: No lymphadenopathy noted  SKIN: No rashes or lesions      Vital Signs Last 24 Hrs  T(C): 36.8 (31 Jul 2020 04:15), Max: 37.2 (30 Jul 2020 12:24)  T(F): 98.2 (31 Jul 2020 04:15), Max: 99 (30 Jul 2020 12:24)  HR: 105 (31 Jul 2020 04:15) (93 - 116)  BP: 105/72 (31 Jul 2020 04:15) (93/67 - 113/73)  BP(mean): --  RR: 17 (31 Jul 2020 04:15) (17 - 18)  SpO2: 94% (31 Jul 2020 04:15) (91% - 96%)                          12.2   8.96  )-----------( 237      ( 31 Jul 2020 06:05 )             39.7       PT/INR - ( 29 Jul 2020 19:51 )   PT: 13.8 sec;   INR: 1.20 ratio    PTT - ( 29 Jul 2020 19:51 )  PTT:28.1 sec    Pain Service   Text Page: [phone] VERBAL REPORT: "It's working very well."  The patient describes incisional pain, well relieved by use of IV PCA demand dose. He presently denies additional pain complaint. He admits sufficient analgesia to get up OOB to chair if needed. He plans on doing so later on once his wife arrives. He takes Vicodin 7.5/300mg PO QID PRN At home. It typically provides adequate coverage for his chronic back pain.  PAIN SCORE: 3-4/10                  SCALE USED: VNRS    Allergies  cucumber (Anaphylaxis; Hives; Urticaria; Short breath)  No Known Drug Allergies    PAST MEDICAL & SURGICAL HISTORY:  Prostate CA  Weak heart  Insomnia  HTN (hypertension)  S/P right inguinal herniorrhaphy  History of back surgery  ICD (implantable cardioverter-defibrillator) in place  Abdominal hernia      MEDICATIONS  (STANDING):  acetaminophen  IVPB .. 1000 milliGRAM(s) IV Intermittent once  atorvastatin 40 milliGRAM(s) Oral at bedtime  carvedilol 3.125 milliGRAM(s) Oral every 12 hours  chlorhexidine 4% Liquid 1 Application(s) Topical <User Schedule>  heparin   Injectable 5000 Unit(s) SubCutaneous every 8 hours  HYDROmorphone PCA (1 mG/mL) 30 milliLiter(s) PCA Continuous PCA Continuous  metoprolol tartrate Injectable 5 milliGRAM(s) IV Push every 6 hours  multiple electrolytes Injection Type 1 1000 milliLiter(s) (75 mL/Hr) IV Continuous <Continuous>  nicotine -  14 mG/24Hr(s) Patch 1 patch Transdermal daily    MEDICATIONS  (PRN):  naloxone Injectable 0.1 milliGRAM(s) IV Push every 3 minutes PRN For ANY of the following changes in patient status:  A. RR LESS THAN 10 breaths per minute, B. Oxygen saturation LESS THAN 90%, C. Sedation score of 6  ondansetron Injectable 4 milliGRAM(s) IV Push every 6 hours PRN Nausea      PHYSICAL EXAM:  GENERAL: NAD, well-developed  HEAD:  Atraumatic, Normocephalic  EYES: EOMI, PERRLA, conjunctiva and sclera clear  NERVOUS SYSTEM:  Alert & Oriented X3, Good concentration; Motor Strength 5/5 B/L upper and lower extremities  CHEST/LUNG: Clear speech, no SOB evident at rest.  ABDOMEN: Incisional tenderness; NG Tube present  EXTREMITIES: No clubbing, cyanosis, or edema  LYMPH: No lymphadenopathy noted  SKIN: No rashes or lesions      Vital Signs Last 24 Hrs  T(C): 36.8 (31 Jul 2020 04:15), Max: 37.2 (30 Jul 2020 12:24)  T(F): 98.2 (31 Jul 2020 04:15), Max: 99 (30 Jul 2020 12:24)  HR: 105 (31 Jul 2020 04:15) (93 - 116)  BP: 105/72 (31 Jul 2020 04:15) (93/67 - 113/73)  BP(mean): --  RR: 17 (31 Jul 2020 04:15) (17 - 18)  SpO2: 94% (31 Jul 2020 04:15) (91% - 96%)                          12.2   8.96  )-----------( 237      ( 31 Jul 2020 06:05 )             39.7       PT/INR - ( 29 Jul 2020 19:51 )   PT: 13.8 sec;   INR: 1.20 ratio    PTT - ( 29 Jul 2020 19:51 )  PTT:28.1 sec    Pain Service   Text Page: [phone]

## 2020-07-31 NOTE — CONSULT NOTE ADULT - PROBLEM SELECTOR RECOMMENDATION 4
BP on lower side , hold ACE/ARB's due to ARF , may start BB( Metoprolol 5 mg Q6 hrs , hold for SBP < 100
1. Maximize use of non-opioid analgesics. When tolerating liquids:  - Tylenol 975mg PO q8hrs for mild pain  - Gabapentin 300mg PO BID for neuropathic pain  - Robaxin 500mg PO q6hrs PRN spasms  2. Bowel regimen per surgical team  3. Follow up with outpatient pain management provider post facility discharge.

## 2020-07-31 NOTE — CONSULT NOTE ADULT - CONSULT REASON
HPI:  Mr. Edgar is a 69M with a PMHx HF s/p AICD/PPM, prostate ca s/p radical prostatectomy, urinary incontinence, HLD, HTN, insomnia, chronic pain syndrome from lumbar disc herniations who presents with a 1-week history of abdominal bloating, nausea, vomiting, and loose dark stools. Patient initially attributed his symptoms to food poising, however abdominal discomfort did not improve with conservative measures. His abdominal pain is rated as a 6/10 and is described as a "knot-like" feeling in his umbilicus. It is associated with decreased appetite. No fevers, chills, CP, or SOB. (27 Jul 2020 00:55)    Pain Service:  The patient is now POD #1 ExLap/ANT/SBR on an IV PCA for pain management. HPI:  Mr. Edgar is a 69M with a PMHx HF s/p AICD/PPM, prostate ca s/p radical prostatectomy, urinary incontinence, HLD, HTN, insomnia, chronic pain syndrome from lumbar disc herniations who presents with a 1-week history of abdominal bloating, nausea, vomiting, and loose dark stools. Patient initially attributed his symptoms to food poising, however abdominal discomfort did not improve with conservative measures. His abdominal pain is rated as a 6/10 and is described as a "knot-like" feeling in his umbilicus. It is associated with decreased appetite. No fevers, chills, CP, or SOB. (27 Jul 2020 00:55)    Pain Service:  The patient is now POD #1 ExLap/ANT/SBR. He is on an IV PCA for pain management.

## 2020-07-31 NOTE — CONSULT NOTE ADULT - PROBLEM SELECTOR PROBLEM 2
Acute renal failure, unspecified acute renal failure type
Urinary retention
Pre-operative cardiovascular examination
Acute postoperative abdominal pain

## 2020-07-31 NOTE — CONSULT NOTE ADULT - PROBLEM SELECTOR RECOMMENDATION 2
- likely obstructive , Dye placed ,  appreciated , mild hydration ,   avoid nephrotoxic medications , Nephro consulted ,   follow BMP
Unable to pass catheter. Bedside cystoscopy was performed. There was a stricture of the bladder neck. The bladder neck was dilated up to 18fr and 16fr Jamul catheter was placed.     Do not remove verma without urology permission
-no cardiac symptoms, no evidence of decompensated CHF  -no ectopy on telemetry, EKG pending  -METs>4  -RCRI risk score 2, class III risk, 6.6% risk major cardiac event  -no absolute cardiac contraindication to planned surgery, benefits outweight potential risk  -DVT ppx
1. Continue IV Tylenol 1gram q8hrs as non-opioid adjunct for mild pain  2. Once IV PCA discontinued  - Offer Oxycodone IR 10mg PO q3hrs PRN moderate pain  - An alternate dose of 15mg may be offered PRN severe pain, if lower dose is inadequate

## 2020-07-31 NOTE — PROGRESS NOTE ADULT - SUBJECTIVE AND OBJECTIVE BOX
Progress Note     Subjective/Overnight: Patient was tachycardic on the lower hundreds 108-116 bpm with slight hypotension 97/63. Blood pressure increase after a 500 fluid bolus enough to increase BP to 113/73. Metoprolol was administered for rate control. Patient restored BP to the 93 bpm and remained       MEDICATIONS  (STANDING):  acetaminophen  IVPB .. 1000 milliGRAM(s) IV Intermittent once  atorvastatin 40 milliGRAM(s) Oral at bedtime  carvedilol 3.125 milliGRAM(s) Oral every 12 hours  chlorhexidine 4% Liquid 1 Application(s) Topical <User Schedule>  heparin   Injectable 5000 Unit(s) SubCutaneous every 8 hours  HYDROmorphone PCA (1 mG/mL) 30 milliLiter(s) PCA Continuous PCA Continuous  metoprolol tartrate Injectable 5 milliGRAM(s) IV Push every 6 hours  multiple electrolytes Injection Type 1 1000 milliLiter(s) (75 mL/Hr) IV Continuous <Continuous>  nicotine -  14 mG/24Hr(s) Patch 1 patch Transdermal daily    MEDICATIONS  (PRN):  naloxone Injectable 0.1 milliGRAM(s) IV Push every 3 minutes PRN For ANY of the following changes in patient status:  A. RR LESS THAN 10 breaths per minute, B. Oxygen saturation LESS THAN 90%, C. Sedation score of 6  ondansetron Injectable 4 milliGRAM(s) IV Push every 6 hours PRN Nausea    Vital Signs:  Vital Signs Last 24 Hrs  T(C): 36.8 (31 Jul 2020 04:15), Max: 37.2 (30 Jul 2020 12:24)  T(F): 98.2 (31 Jul 2020 04:15), Max: 99 (30 Jul 2020 12:24)  HR: 105 (31 Jul 2020 04:15) (93 - 116)  BP: 105/72 (31 Jul 2020 04:15) (93/67 - 113/73)  BP(mean): --  RR: 17 (31 Jul 2020 04:15) (17 - 18)  SpO2: 94% (31 Jul 2020 04:15) (91% - 96%)    I&O's Detail    30 Jul 2020 07:01  -  31 Jul 2020 07:00  --------------------------------------------------------  IN:    multiple electrolytes Injection Type 1: 1575 mL    multiple electrolytes Injection Type 1 Bolus: 250 mL    Sodium Chloride 0.9% IV Bolus: 500 mL    Solution: 200 mL  Total IN: 2525 mL    OUT:    Indwelling Catheter - Urethral: 2270 mL    Nasoenteral Tube: 1200 mL  Total OUT: 3470 mL    Total NET: -945 mL  Physical Exam:  General: alert, oriented x 3 in no acute distress   HEENT: NGT in place   CV: normal S1/S2, RRR  Pulmonary: clear to auscultation, symmetric chest movement   Abdomen: abdominal binder in place, distended, mildly tender, no rigidity or rebound tenderness      Labs:    07-31    146<H>  |  104  |  29.0<H>  ----------------------------<  99  3.8   |  27.0  |  1.36<H>    Ca    7.2<L>      31 Jul 2020 06:05  Phos  2.5     07-31  Mg     2.7     07-31                      12.2   8.96  )-----------( 237      ( 31 Jul 2020 06:05 )             39.7     PT/INR - ( 29 Jul 2020 19:51 )   PT: 13.8 sec;   INR: 1.20 ratio         PTT - ( 29 Jul 2020 19:51 )  PTT:28.1 sec Progress Note     Subjective/Overnight: Patient was tachycardic on the lower hundreds 108-116 bpm with slight hypotension 97/63. Blood pressure increase after a 500 fluid bolus enough to increase BP to 113/73. Metoprolol dose was administered for rate control. Patient restored heart rate to 93 bpm and remained stable. Pain under control with Dilaudid PCA and acetaminophen. Pulse ox was 94% at room air. Denied SOB or labored breathing. Currently NPO. Monitoring NGT outputs. Dye in place with adequate urine output. Patient on DVT ppx with heparin.       MEDICATIONS  (STANDING):  acetaminophen  IVPB .. 1000 milliGRAM(s) IV Intermittent once  atorvastatin 40 milliGRAM(s) Oral at bedtime  carvedilol 3.125 milliGRAM(s) Oral every 12 hours  chlorhexidine 4% Liquid 1 Application(s) Topical <User Schedule>  heparin   Injectable 5000 Unit(s) SubCutaneous every 8 hours  HYDROmorphone PCA (1 mG/mL) 30 milliLiter(s) PCA Continuous PCA Continuous  metoprolol tartrate Injectable 5 milliGRAM(s) IV Push every 6 hours  multiple electrolytes Injection Type 1 1000 milliLiter(s) (75 mL/Hr) IV Continuous <Continuous>  nicotine -  14 mG/24Hr(s) Patch 1 patch Transdermal daily    MEDICATIONS  (PRN):  naloxone Injectable 0.1 milliGRAM(s) IV Push every 3 minutes PRN For ANY of the following changes in patient status:  A. RR LESS THAN 10 breaths per minute, B. Oxygen saturation LESS THAN 90%, C. Sedation score of 6  ondansetron Injectable 4 milliGRAM(s) IV Push every 6 hours PRN Nausea    Vital Signs:  Vital Signs Last 24 Hrs  T(C): 36.8 (31 Jul 2020 04:15), Max: 37.2 (30 Jul 2020 12:24)  T(F): 98.2 (31 Jul 2020 04:15), Max: 99 (30 Jul 2020 12:24)  HR: 105 (31 Jul 2020 04:15) (93 - 116)  BP: 105/72 (31 Jul 2020 04:15) (93/67 - 113/73)  BP(mean): --  RR: 17 (31 Jul 2020 04:15) (17 - 18)  SpO2: 94% (31 Jul 2020 04:15) (91% - 96%)    I&O's Detail    30 Jul 2020 07:01  -  31 Jul 2020 07:00  --------------------------------------------------------  IN:    multiple electrolytes Injection Type 1: 1575 mL    multiple electrolytes Injection Type 1 Bolus: 250 mL    Sodium Chloride 0.9% IV Bolus: 500 mL    Solution: 200 mL  Total IN: 2525 mL    OUT:    Indwelling Catheter - Urethral: 2270 mL    Nasoenteral Tube: 1200 mL  Total OUT: 3470 mL    Total NET: -945 mL  Physical Exam:  General: alert, oriented x 3 in no acute distress   HEENT: NGT in place   CV: normal S1/S2, RRR  Pulmonary: clear to auscultation, symmetric chest movement   Abdomen: abdominal binder in place, distended, mildly tender, no rigidity or rebound tenderness      Labs:    07-31    146<H>  |  104  |  29.0<H>  ----------------------------<  99  3.8   |  27.0  |  1.36<H>    Ca    7.2<L>      31 Jul 2020 06:05  Phos  2.5     07-31  Mg     2.7     07-31                      12.2   8.96  )-----------( 237      ( 31 Jul 2020 06:05 )             39.7     PT/INR - ( 29 Jul 2020 19:51 )   PT: 13.8 sec;   INR: 1.20 ratio         PTT - ( 29 Jul 2020 19:51 )  PTT:28.1 sec

## 2020-07-31 NOTE — CONSULT NOTE ADULT - PROBLEM SELECTOR PROBLEM 1
Small bowel obstruction
Prostate CA
Chronic heart failure, unspecified heart failure type
Pain managed using patient-controlled analgesia (PCA)

## 2020-07-31 NOTE — CONSULT NOTE ADULT - PROBLEM SELECTOR RECOMMENDATION 9
NPO , IVF - as per primary team
Patient needs to follow up to monitor prostate cancer
-most likely systolic but LVEF unknown  -TTE in am  -continue Coreg 12.5 mg bid  -hold Enalapril due to ZANE, may restart when creatine improves  -continue ASA/statin
1. Patient used 15.2mg of IV Dilaudid via PCA pump since initiated  - No changes to current settings  2. Offer IV Dilaudid 0.5mg q3hrs PRN severe persistent pain

## 2020-07-31 NOTE — CONSULT NOTE ADULT - ASSESSMENT
69y old opioid tolerant Male admitted with High Grade SBO. Now POD #1 ExLap/ANT/SBR on an IV PCA for pain management. Found sound asleep, NAD, supine in bed. Awakens to verbal stimuli, oriented x3, no evidence of oversedation. He is NPO with NG Tube, admits adequate analgesia with current regimen. Discussed continuing the IV PCA until liquids tolerated. Patient verbalizes understanding. 69y old opioid tolerant Male admitted with High Grade SBO. Now POD #1 ExLap/ANT/SBR on an IV PCA for pain management. Found sound asleep, NAD, supine in bed. Awakens to verbal stimuli, oriented x3, no evidence of oversedation. He is NPO with NG Tube, admits adequate analgesia with current regimen. Discussed continuing the IV PCA until oral liquids tolerated. Patient verbalizes understanding.

## 2020-08-01 LAB
ALBUMIN SERPL ELPH-MCNC: 2.8 G/DL — LOW (ref 3.3–5.2)
ANION GAP SERPL CALC-SCNC: 12 MMOL/L — SIGNIFICANT CHANGE UP (ref 5–17)
BUN SERPL-MCNC: 22 MG/DL — HIGH (ref 8–20)
CALCIUM SERPL-MCNC: 7.5 MG/DL — LOW (ref 8.6–10.2)
CHLORIDE SERPL-SCNC: 107 MMOL/L — SIGNIFICANT CHANGE UP (ref 98–107)
CO2 SERPL-SCNC: 27 MMOL/L — SIGNIFICANT CHANGE UP (ref 22–29)
CREAT SERPL-MCNC: 1.09 MG/DL — SIGNIFICANT CHANGE UP (ref 0.5–1.3)
GLUCOSE SERPL-MCNC: 108 MG/DL — HIGH (ref 70–99)
MAGNESIUM SERPL-MCNC: 2.7 MG/DL — HIGH (ref 1.8–2.6)
PHOSPHATE SERPL-MCNC: 1.9 MG/DL — LOW (ref 2.4–4.7)
POTASSIUM SERPL-MCNC: 3.7 MMOL/L — SIGNIFICANT CHANGE UP (ref 3.5–5.3)
POTASSIUM SERPL-SCNC: 3.7 MMOL/L — SIGNIFICANT CHANGE UP (ref 3.5–5.3)
SODIUM SERPL-SCNC: 146 MMOL/L — HIGH (ref 135–145)

## 2020-08-01 PROCEDURE — 99024 POSTOP FOLLOW-UP VISIT: CPT

## 2020-08-01 RX ORDER — SODIUM CHLORIDE 9 MG/ML
500 INJECTION INTRAMUSCULAR; INTRAVENOUS; SUBCUTANEOUS ONCE
Refills: 0 | Status: COMPLETED | OUTPATIENT
Start: 2020-08-01 | End: 2020-08-01

## 2020-08-01 RX ORDER — SODIUM CHLORIDE 9 MG/ML
500 INJECTION, SOLUTION INTRAVENOUS ONCE
Refills: 0 | Status: COMPLETED | OUTPATIENT
Start: 2020-08-01 | End: 2020-08-01

## 2020-08-01 RX ORDER — POTASSIUM PHOSPHATE, MONOBASIC POTASSIUM PHOSPHATE, DIBASIC 236; 224 MG/ML; MG/ML
30 INJECTION, SOLUTION INTRAVENOUS ONCE
Refills: 0 | Status: COMPLETED | OUTPATIENT
Start: 2020-08-01 | End: 2020-08-01

## 2020-08-01 RX ADMIN — ATORVASTATIN CALCIUM 40 MILLIGRAM(S): 80 TABLET, FILM COATED ORAL at 21:08

## 2020-08-01 RX ADMIN — HEPARIN SODIUM 5000 UNIT(S): 5000 INJECTION INTRAVENOUS; SUBCUTANEOUS at 21:10

## 2020-08-01 RX ADMIN — CHLORHEXIDINE GLUCONATE 1 APPLICATION(S): 213 SOLUTION TOPICAL at 06:01

## 2020-08-01 RX ADMIN — SODIUM CHLORIDE 500 MILLILITER(S): 9 INJECTION INTRAMUSCULAR; INTRAVENOUS; SUBCUTANEOUS at 01:55

## 2020-08-01 RX ADMIN — HEPARIN SODIUM 5000 UNIT(S): 5000 INJECTION INTRAVENOUS; SUBCUTANEOUS at 06:02

## 2020-08-01 RX ADMIN — Medication 1 PATCH: at 11:11

## 2020-08-01 RX ADMIN — HYDROMORPHONE HYDROCHLORIDE 30 MILLILITER(S): 2 INJECTION INTRAMUSCULAR; INTRAVENOUS; SUBCUTANEOUS at 19:07

## 2020-08-01 RX ADMIN — Medication 1 PATCH: at 19:48

## 2020-08-01 RX ADMIN — Medication 1 PATCH: at 11:00

## 2020-08-01 RX ADMIN — Medication 1 PATCH: at 07:18

## 2020-08-01 RX ADMIN — HYDROMORPHONE HYDROCHLORIDE 30 MILLILITER(S): 2 INJECTION INTRAMUSCULAR; INTRAVENOUS; SUBCUTANEOUS at 07:33

## 2020-08-01 RX ADMIN — POTASSIUM PHOSPHATE, MONOBASIC POTASSIUM PHOSPHATE, DIBASIC 83.33 MILLIMOLE(S): 236; 224 INJECTION, SOLUTION INTRAVENOUS at 07:01

## 2020-08-01 RX ADMIN — HEPARIN SODIUM 5000 UNIT(S): 5000 INJECTION INTRAVENOUS; SUBCUTANEOUS at 13:16

## 2020-08-01 RX ADMIN — SODIUM CHLORIDE 500 MILLILITER(S): 9 INJECTION, SOLUTION INTRAVENOUS at 09:45

## 2020-08-01 RX ADMIN — SODIUM CHLORIDE 500 MILLILITER(S): 9 INJECTION, SOLUTION INTRAVENOUS at 15:43

## 2020-08-01 RX ADMIN — SODIUM CHLORIDE 75 MILLILITER(S): 9 INJECTION, SOLUTION INTRAVENOUS at 06:01

## 2020-08-01 NOTE — PROGRESS NOTE ADULT - ASSESSMENT
70 y/o M presented with Small bowel obstruction s/p Lap converted to open ANT Enterotomy and with small bowel resection on 7/29. Hemodynamically stable and pain well controlled.    -Continue NGT and NPO until return of bowel function  -Replete NGT output, monitor I & O   - dc PCA and transition to PO multimodal pain regimen   -Out of bed to chair   -Encourage IS   - Maintain verma for strict I & O

## 2020-08-01 NOTE — PHYSICAL THERAPY INITIAL EVALUATION ADULT - DIAGNOSIS, PT EVAL
Difficulty walking due to Small bowel obstruction s/p Lap converted to open ANT Enterotomy and with small bowel resection

## 2020-08-01 NOTE — DIETITIAN INITIAL EVALUATION ADULT. - OTHER INFO
69M with a PMHx HF s/p AICD/PPM, prostate ca s/p radical prostatectomy, urinary incontinence, HLD, HTN, insomnia, chronic pain syndrome from lumbar disc herniations who presents with a 1-week history of abdominal bloating, nausea, vomiting, and loose dark stools.   Pt denied any chewing/swallowing difficulty. Pt reports having a decreased appetite for ~3 days prior to admission, but now is feeling hungry. Pt reports normally having a good appetite and usually has 3 meals per day. Per MD documentation, pt NPO until return of bowel function. Pt states UBW is 218#. Monitor daily wts as feasible. No edema noted. RD to remain available.

## 2020-08-01 NOTE — PHYSICAL THERAPY INITIAL EVALUATION ADULT - CRITERIA FOR SKILLED THERAPEUTIC INTERVENTIONS
therapy frequency/risk reduction/prevention/rehab potential/anticipated equipment needs at discharge/anticipated discharge recommendation/functional limitations in following categories/predicted duration of therapy intervention/impairments found

## 2020-08-01 NOTE — PHYSICAL THERAPY INITIAL EVALUATION ADULT - GENERAL OBSERVATIONS, REHAB EVAL
Patient was lying down on bed with PCA, Cardiac Monitor, Wound vac, NG tube, 2.0 lit O2, PCA, abdominal dressing

## 2020-08-01 NOTE — DIETITIAN INITIAL EVALUATION ADULT. - ADD RECOMMEND
Advance diet as medically feasible. Consider alternate nutrition routes as appropriate. Monitor daily wts. Monitor labs.

## 2020-08-01 NOTE — PHYSICAL THERAPY INITIAL EVALUATION ADULT - ACTIVE RANGE OF MOTION EXAMINATION, REHAB EVAL
Called and spoke with mom to inform about labs -- no anemia, negative celiac, and normal electrolytes.  Informed mom that inflammatory markers were elevated.  Mom states she is unaware of any recent infection, and she would like to proceed with stool studies as recommended by MD.  Instructed mom she can  a stool collection kit at any [facility] lab, and informed mom to time frames for bringing in sample.  No further questions from mom at this time.    bilateral upper extremity Active ROM was WFL (within functional limits)/bilateral  lower extremity Active ROM was WFL (within functional limits)

## 2020-08-01 NOTE — PROGRESS NOTE ADULT - SUBJECTIVE AND OBJECTIVE BOX
INTERVAL HPI/OVERNIGHT EVENTS:  No acute overnight events. Patient with no major complaints, states that his pain is improved with the current regime. Afebrile but mildly tachycardic, bolused 500 cc IVFs to replete NGT and UOP.       MEDICATIONS  (STANDING):  atorvastatin 40 milliGRAM(s) Oral at bedtime  carvedilol 3.125 milliGRAM(s) Oral every 12 hours  chlorhexidine 4% Liquid 1 Application(s) Topical <User Schedule>  dextrose 5% + sodium chloride 0.45% 1000 milliLiter(s) (75 mL/Hr) IV Continuous <Continuous>  heparin   Injectable 5000 Unit(s) SubCutaneous every 8 hours  HYDROmorphone PCA (1 mG/mL) 30 milliLiter(s) PCA Continuous PCA Continuous  metoprolol tartrate Injectable 5 milliGRAM(s) IV Push every 6 hours  nicotine -  14 mG/24Hr(s) Patch 1 patch Transdermal daily    MEDICATIONS  (PRN):  naloxone Injectable 0.1 milliGRAM(s) IV Push every 3 minutes PRN For ANY of the following changes in patient status:  A. RR LESS THAN 10 breaths per minute, B. Oxygen saturation LESS THAN 90%, C. Sedation score of 6  ondansetron Injectable 4 milliGRAM(s) IV Push every 6 hours PRN Nausea      Vital Signs Last 24 Hrs  T(C): 36.8 (01 Aug 2020 04:34), Max: 36.8 (31 Jul 2020 07:40)  T(F): 98.2 (01 Aug 2020 04:34), Max: 98.3 (31 Jul 2020 07:40)  HR: 101 (01 Aug 2020 04:34) (96 - 110)  BP: 100/59 (01 Aug 2020 04:34) (98/68 - 122/60)  BP(mean): --  RR: 20 (01 Aug 2020 04:34) (18 - 20)  SpO2: 94% (01 Aug 2020 04:34) (94% - 97%)    PE  Gen: No acute distress  Pulm: Nonlabored breathing, no conversational dyspnea   CV: RRR, S1, S2  Abd: nontender, nondistended, incision sites well healed, no surrounding erythema nor purulent drainage   Ext: No pitting edema B/L  Vasc: 2+ radial and PT pulses B/L  Neuro: AAOX3        I&O's Detail    30 Jul 2020 07:01  -  31 Jul 2020 07:00  --------------------------------------------------------  IN:    multiple electrolytes Injection Type 1 Bolus: 250 mL    multiple electrolytes Injection Type 1multiple electrolytes Injection Type 1: 1575 mL    Sodium Chloride 0.9% IV Bolus: 500 mL    Solution: 200 mL  Total IN: 2525 mL    OUT:    Indwelling Catheter - Urethral: 2270 mL    Nasoenteral Tube: 1400 mL  Total OUT: 3670 mL    Total NET: -1145 mL      31 Jul 2020 07:01  -  01 Aug 2020 04:54  --------------------------------------------------------  IN:    dextrose 5% + sodium chloride 0.45%: 1125 mL    multiple electrolytes Injection Type 1multiple electrolytes Injection Type 1: 450 mL    Sodium Chloride 0.9% IV Bolus: 500 mL    Solution: 350 mL  Total IN: 2425 mL    OUT:    Indwelling Catheter - Urethral: 1500 mL    Nasoenteral Tube: 1600 mL  Total OUT: 3100 mL    Total NET: -675 mL          LABS:                        12.2   8.96  )-----------( 237      ( 31 Jul 2020 06:05 )             39.7     07-31    146<H>  |  104  |  29.0<H>  ----------------------------<  99  3.8   |  27.0  |  1.36<H>    Ca    7.2<L>      31 Jul 2020 06:05  Phos  2.5     07-31  Mg     2.7     07-31            RADIOLOGY & ADDITIONAL STUDIES:

## 2020-08-02 LAB
ANION GAP SERPL CALC-SCNC: 12 MMOL/L — SIGNIFICANT CHANGE UP (ref 5–17)
BUN SERPL-MCNC: 15 MG/DL — SIGNIFICANT CHANGE UP (ref 8–20)
CALCIUM SERPL-MCNC: 8.1 MG/DL — LOW (ref 8.6–10.2)
CHLORIDE SERPL-SCNC: 107 MMOL/L — SIGNIFICANT CHANGE UP (ref 98–107)
CO2 SERPL-SCNC: 24 MMOL/L — SIGNIFICANT CHANGE UP (ref 22–29)
CREAT SERPL-MCNC: 0.96 MG/DL — SIGNIFICANT CHANGE UP (ref 0.5–1.3)
GLUCOSE SERPL-MCNC: 119 MG/DL — HIGH (ref 70–99)
MAGNESIUM SERPL-MCNC: 2.2 MG/DL — SIGNIFICANT CHANGE UP (ref 1.6–2.6)
PHOSPHATE SERPL-MCNC: 2.6 MG/DL — SIGNIFICANT CHANGE UP (ref 2.4–4.7)
POTASSIUM SERPL-MCNC: 4.2 MMOL/L — SIGNIFICANT CHANGE UP (ref 3.5–5.3)
POTASSIUM SERPL-SCNC: 4.2 MMOL/L — SIGNIFICANT CHANGE UP (ref 3.5–5.3)
SODIUM SERPL-SCNC: 143 MMOL/L — SIGNIFICANT CHANGE UP (ref 135–145)

## 2020-08-02 PROCEDURE — 99024 POSTOP FOLLOW-UP VISIT: CPT

## 2020-08-02 RX ORDER — SODIUM CHLORIDE 9 MG/ML
620 INJECTION, SOLUTION INTRAVENOUS ONCE
Refills: 0 | Status: COMPLETED | OUTPATIENT
Start: 2020-08-02 | End: 2020-08-02

## 2020-08-02 RX ADMIN — HYDROMORPHONE HYDROCHLORIDE 30 MILLILITER(S): 2 INJECTION INTRAMUSCULAR; INTRAVENOUS; SUBCUTANEOUS at 19:09

## 2020-08-02 RX ADMIN — SODIUM CHLORIDE 75 MILLILITER(S): 9 INJECTION, SOLUTION INTRAVENOUS at 00:39

## 2020-08-02 RX ADMIN — CHLORHEXIDINE GLUCONATE 1 APPLICATION(S): 213 SOLUTION TOPICAL at 11:25

## 2020-08-02 RX ADMIN — Medication 1 PATCH: at 11:09

## 2020-08-02 RX ADMIN — SODIUM CHLORIDE 75 MILLILITER(S): 9 INJECTION, SOLUTION INTRAVENOUS at 17:22

## 2020-08-02 RX ADMIN — Medication 1 PATCH: at 11:24

## 2020-08-02 RX ADMIN — SODIUM CHLORIDE 620 MILLILITER(S): 9 INJECTION, SOLUTION INTRAVENOUS at 23:56

## 2020-08-02 RX ADMIN — Medication 1 PATCH: at 07:30

## 2020-08-02 RX ADMIN — Medication 5 MILLIGRAM(S): at 00:22

## 2020-08-02 RX ADMIN — HEPARIN SODIUM 5000 UNIT(S): 5000 INJECTION INTRAVENOUS; SUBCUTANEOUS at 14:18

## 2020-08-02 RX ADMIN — HYDROMORPHONE HYDROCHLORIDE 30 MILLILITER(S): 2 INJECTION INTRAMUSCULAR; INTRAVENOUS; SUBCUTANEOUS at 06:58

## 2020-08-02 RX ADMIN — HEPARIN SODIUM 5000 UNIT(S): 5000 INJECTION INTRAVENOUS; SUBCUTANEOUS at 21:09

## 2020-08-02 RX ADMIN — HEPARIN SODIUM 5000 UNIT(S): 5000 INJECTION INTRAVENOUS; SUBCUTANEOUS at 05:18

## 2020-08-02 RX ADMIN — Medication 5 MILLIGRAM(S): at 11:24

## 2020-08-02 RX ADMIN — Medication 5 MILLIGRAM(S): at 17:17

## 2020-08-02 RX ADMIN — CARVEDILOL PHOSPHATE 3.12 MILLIGRAM(S): 80 CAPSULE, EXTENDED RELEASE ORAL at 05:11

## 2020-08-02 RX ADMIN — Medication 1 PATCH: at 20:02

## 2020-08-02 RX ADMIN — Medication 5 MILLIGRAM(S): at 05:05

## 2020-08-02 NOTE — PROGRESS NOTE ADULT - PROBLEM SELECTOR PLAN 2
reportedly s/p RRP
Recommendation: 1. Continue IV Tylenol 1gram q8hrs as non-opioid adjunct for mild pain  2. Once IV PCA discontinued  - Offer Oxycodone IR 10mg PO q3hrs PRN moderate pain  - An alternate dose of 15mg may be offered PRN severe pain, if lower dose is inadequate.

## 2020-08-02 NOTE — PROGRESS NOTE ADULT - PROBLEM SELECTOR PLAN 3
Recommendation   1. Maximize use of non-opioid analgesics. When tolerating liquids:  - Tylenol 975mg PO q8hrs for mild pain  - Gabapentin 300mg PO BID for neuropathic pain  - Robaxin 500mg PO q6hrs PRN spasms  2. Bowel regimen per surgical team  3. Follow up with outpatient pain management provider post facility discharge.

## 2020-08-02 NOTE — PROGRESS NOTE ADULT - SUBJECTIVE AND OBJECTIVE BOX
INTERVAL HPI/OVERNIGHT EVENTS:    Patient being repleted half to 1 for NGT losses. Cr continues to trend down and normalize. NGT still with bilious output.     MEDICATIONS  (STANDING):  atorvastatin 40 milliGRAM(s) Oral at bedtime  carvedilol 3.125 milliGRAM(s) Oral every 12 hours  chlorhexidine 4% Liquid 1 Application(s) Topical <User Schedule>  dextrose 5% + sodium chloride 0.45% 1000 milliLiter(s) (75 mL/Hr) IV Continuous <Continuous>  heparin   Injectable 5000 Unit(s) SubCutaneous every 8 hours  HYDROmorphone PCA (1 mG/mL) 30 milliLiter(s) PCA Continuous PCA Continuous  metoprolol tartrate Injectable 5 milliGRAM(s) IV Push every 6 hours  nicotine -  14 mG/24Hr(s) Patch 1 patch Transdermal daily    MEDICATIONS  (PRN):  naloxone Injectable 0.1 milliGRAM(s) IV Push every 3 minutes PRN For ANY of the following changes in patient status:  A. RR LESS THAN 10 breaths per minute, B. Oxygen saturation LESS THAN 90%, C. Sedation score of 6  ondansetron Injectable 4 milliGRAM(s) IV Push every 6 hours PRN Nausea      Vital Signs Last 24 Hrs  T(C): 36.9 (02 Aug 2020 04:46), Max: 36.9 (02 Aug 2020 04:46)  T(F): 98.4 (02 Aug 2020 04:46), Max: 98.4 (02 Aug 2020 04:46)  HR: 85 (02 Aug 2020 04:46) (85 - 100)  BP: 110/70 (02 Aug 2020 04:58) (97/59 - 119/77)  BP(mean): --  RR: 20 (02 Aug 2020 04:46) (18 - 20)  SpO2: 97% (02 Aug 2020 04:46) (93% - 98%)    PE  Gen: No acute distress  Pulm: Nonlabored breathing, no conversational dyspnea   CV: RRR, S1, S2  Abd: nontender, nondistended, incision sites well healed, no surrounding erythema nor purulent drainage   Ext: No pitting edema B/L  Neuro: AAOX3      I&O's Detail    01 Aug 2020 07:01  -  02 Aug 2020 07:00  --------------------------------------------------------  IN:    dextrose 5% + sodium chloride 0.45%: 975 mL  Total IN: 975 mL    OUT:    Indwelling Catheter - Urethral: 1100 mL    Nasoenteral Tube: 1850 mL  Total OUT: 2950 mL    Total NET: -1975 mL          LABS:    08-02    143  |  107  |  15.0  ----------------------------<  119<H>  4.2   |  24.0  |  0.96    Ca    8.1<L>      02 Aug 2020 05:02  Phos  2.6     08-02  Mg     2.2     08-02    TPro  x   /  Alb  2.8<L>  /  TBili  x   /  DBili  x   /  AST  x   /  ALT  x   /  AlkPhos  x   08-01          RADIOLOGY & ADDITIONAL STUDIES:

## 2020-08-02 NOTE — PROGRESS NOTE ADULT - SUBJECTIVE AND OBJECTIVE BOX
HPI:  Patient is a 69y old  Male who presents with a chief complaint of High Grade SBO (02 Aug 2020 08:10)    Pain Service:  The patient is now POD #3 ExLap/ANT/SBR. He is on an IV PCA for pain management.    PAIN SCORE:   3/10                SCALE USED: VNRS    Allergies  cucumber (Anaphylaxis; Hives; Urticaria; Short breath)  No Known Drug Allergies      PAST MEDICAL & SURGICAL HISTORY:  Prostate CA  Weak heart  Insomnia  HTN (hypertension)  S/P right inguinal herniorrhaphy  History of back surgery  ICD (implantable cardioverter-defibrillator) in place  Abdominal hernia      MEDICATIONS  (STANDING):  atorvastatin 40 milliGRAM(s) Oral at bedtime  carvedilol 3.125 milliGRAM(s) Oral every 12 hours  chlorhexidine 4% Liquid 1 Application(s) Topical <User Schedule>  dextrose 5% + sodium chloride 0.45% 1000 milliLiter(s) (75 mL/Hr) IV Continuous <Continuous>  heparin   Injectable 5000 Unit(s) SubCutaneous every 8 hours  HYDROmorphone PCA (1 mG/mL) 30 milliLiter(s) PCA Continuous PCA Continuous  metoprolol tartrate Injectable 5 milliGRAM(s) IV Push every 6 hours  nicotine -  14 mG/24Hr(s) Patch 1 patch Transdermal daily    MEDICATIONS  (PRN):  naloxone Injectable 0.1 milliGRAM(s) IV Push every 3 minutes PRN For ANY of the following changes in patient status:  A. RR LESS THAN 10 breaths per minute, B. Oxygen saturation LESS THAN 90%, C. Sedation score of 6  ondansetron Injectable 4 milliGRAM(s) IV Push every 6 hours PRN Nausea      PHYSICAL EXAM:    GENERAL: NAD, well-groomed, well-developed  HEAD:  Atraumatic, Normocephalic  NECK: No JVD, Normal thyroid  NERVOUS SYSTEM:  Alert & Oriented X3, Good concentration; Motor Strength 5/5 B/L upper and lower extremities  CHEST/LUNG: No SOB evident at rest  ABDOMEN: Soft, Nontender, Nondistended; Bowel sounds present  EXTREMITIES: No clubbing, cyanosis, or edema  LYMPH: No lymphadenopathy noted  SKIN: No rashes or lesions      Vital Signs Last 24 Hrs  T(C): 36.6 (02 Aug 2020 11:14), Max: 36.9 (02 Aug 2020 04:46)  T(F): 97.8 (02 Aug 2020 11:14), Max: 98.4 (02 Aug 2020 04:46)  HR: 790 (02 Aug 2020 11:14) (85 - 790)  BP: 123/69 (02 Aug 2020 11:14) (102/68 - 123/69)  BP(mean): --  RR: 18 (02 Aug 2020 11:14) (18 - 20)  SpO2: 94% (02 Aug 2020 11:14) (93% - 97%)        LIVER FUNCTIONS - ( 01 Aug 2020 05:17 )  Alb: 2.8 g/dL / Pro: x     / ALK PHOS: x     / ALT: x     / AST: x     / GGT: x                 Pain Service   Text Page via [employer]  PIN: [phone]

## 2020-08-02 NOTE — PROGRESS NOTE ADULT - ASSESSMENT
69y old opioid tolerant Male admitted with High Grade SBO. Now POD #3 ExLap/ANT/SBR on an IV PCA for pain management. Patient A&Ox3, supine in bed, no evidence of oversedation. He is NPO with NG Tube, admits adequate analgesia with current regimen. Patient expresses that pain is being managed well. He has ambulated and moves around in bed without complaints at this time, had bowel movements x 3, passed gas without complications. Discussed continuing the IV PCA until oral liquids tolerated. Patient verbalizes understanding.

## 2020-08-02 NOTE — PROGRESS NOTE ADULT - ASSESSMENT
70 y/o M presented with Small bowel obstruction s/p Lap converted to open ANT Enterotomy and with small bowel resection on 7/29, now w/post-op ileus requiring NGT    -clamp trial when NGT output start to decrease  -Replete NGT output, monitor I & O   -Out of bed to chair   -Encourage IS   - Maintain verma for strict I & O

## 2020-08-02 NOTE — PROGRESS NOTE ADULT - SUBJECTIVE AND OBJECTIVE BOX
Pain well controlled, ambulating. NGT with 1100mL output. Pt reports flatus and BM x 2  Abdomen soft, decreased incisional tenderness  Creatinine downtrending  Clamp trial today, if low output will remove NGT

## 2020-08-02 NOTE — PROGRESS NOTE ADULT - PROBLEM SELECTOR PLAN 1
Bladder neck (anastomotic stricture). Do not remove Dye
Pain managed using patient-controlled analgesia (PCA). Recommendation: 1. Patient used 10.8mg of IV Dilaudid via PCA pump in the last 24 hours.  - No changes to current settings  2. Offer IV Dilaudid 0.5mg q3hrs PRN severe persistent pain.

## 2020-08-03 LAB
ANION GAP SERPL CALC-SCNC: 15 MMOL/L — SIGNIFICANT CHANGE UP (ref 5–17)
BUN SERPL-MCNC: 15 MG/DL — SIGNIFICANT CHANGE UP (ref 8–20)
CALCIUM SERPL-MCNC: 8.5 MG/DL — LOW (ref 8.6–10.2)
CHLORIDE SERPL-SCNC: 109 MMOL/L — HIGH (ref 98–107)
CO2 SERPL-SCNC: 23 MMOL/L — SIGNIFICANT CHANGE UP (ref 22–29)
CREAT SERPL-MCNC: 1.05 MG/DL — SIGNIFICANT CHANGE UP (ref 0.5–1.3)
GLUCOSE SERPL-MCNC: 100 MG/DL — HIGH (ref 70–99)
HCT VFR BLD CALC: 36.8 % — LOW (ref 39–50)
HGB BLD-MCNC: 11.3 G/DL — LOW (ref 13–17)
MAGNESIUM SERPL-MCNC: 2 MG/DL — SIGNIFICANT CHANGE UP (ref 1.6–2.6)
MCHC RBC-ENTMCNC: 27.2 PG — SIGNIFICANT CHANGE UP (ref 27–34)
MCHC RBC-ENTMCNC: 30.7 GM/DL — LOW (ref 32–36)
MCV RBC AUTO: 88.5 FL — SIGNIFICANT CHANGE UP (ref 80–100)
PHOSPHATE SERPL-MCNC: 2.8 MG/DL — SIGNIFICANT CHANGE UP (ref 2.4–4.7)
PLATELET # BLD AUTO: 333 K/UL — SIGNIFICANT CHANGE UP (ref 150–400)
POTASSIUM SERPL-MCNC: 4.8 MMOL/L — SIGNIFICANT CHANGE UP (ref 3.5–5.3)
POTASSIUM SERPL-SCNC: 4.8 MMOL/L — SIGNIFICANT CHANGE UP (ref 3.5–5.3)
RBC # BLD: 4.16 M/UL — LOW (ref 4.2–5.8)
RBC # FLD: 15 % — HIGH (ref 10.3–14.5)
SODIUM SERPL-SCNC: 147 MMOL/L — HIGH (ref 135–145)
SURGICAL PATHOLOGY STUDY: SIGNIFICANT CHANGE UP
WBC # BLD: 11.99 K/UL — HIGH (ref 3.8–10.5)
WBC # FLD AUTO: 11.99 K/UL — HIGH (ref 3.8–10.5)

## 2020-08-03 PROCEDURE — 99024 POSTOP FOLLOW-UP VISIT: CPT

## 2020-08-03 PROCEDURE — 74018 RADEX ABDOMEN 1 VIEW: CPT | Mod: 26

## 2020-08-03 RX ORDER — TAMSULOSIN HYDROCHLORIDE 0.4 MG/1
0.4 CAPSULE ORAL AT BEDTIME
Refills: 0 | Status: DISCONTINUED | OUTPATIENT
Start: 2020-08-03 | End: 2020-08-05

## 2020-08-03 RX ORDER — ASPIRIN/CALCIUM CARB/MAGNESIUM 324 MG
81 TABLET ORAL DAILY
Refills: 0 | Status: DISCONTINUED | OUTPATIENT
Start: 2020-08-03 | End: 2020-08-05

## 2020-08-03 RX ORDER — IBUPROFEN 200 MG
600 TABLET ORAL THREE TIMES A DAY
Refills: 0 | Status: DISCONTINUED | OUTPATIENT
Start: 2020-08-03 | End: 2020-08-05

## 2020-08-03 RX ORDER — HYDROMORPHONE HYDROCHLORIDE 2 MG/ML
0.5 INJECTION INTRAMUSCULAR; INTRAVENOUS; SUBCUTANEOUS EVERY 4 HOURS
Refills: 0 | Status: DISCONTINUED | OUTPATIENT
Start: 2020-08-03 | End: 2020-08-04

## 2020-08-03 RX ORDER — HYDROMORPHONE HYDROCHLORIDE 2 MG/ML
1 INJECTION INTRAMUSCULAR; INTRAVENOUS; SUBCUTANEOUS EVERY 4 HOURS
Refills: 0 | Status: DISCONTINUED | OUTPATIENT
Start: 2020-08-03 | End: 2020-08-04

## 2020-08-03 RX ORDER — ZOLPIDEM TARTRATE 10 MG/1
5 TABLET ORAL AT BEDTIME
Refills: 0 | Status: DISCONTINUED | OUTPATIENT
Start: 2020-08-03 | End: 2020-08-05

## 2020-08-03 RX ORDER — ZOLPIDEM TARTRATE 10 MG/1
5 TABLET ORAL AT BEDTIME
Refills: 0 | Status: DISCONTINUED | OUTPATIENT
Start: 2020-08-03 | End: 2020-08-03

## 2020-08-03 RX ADMIN — Medication 62.5 MILLIMOLE(S): at 10:27

## 2020-08-03 RX ADMIN — Medication 1 PATCH: at 08:11

## 2020-08-03 RX ADMIN — ATORVASTATIN CALCIUM 40 MILLIGRAM(S): 80 TABLET, FILM COATED ORAL at 21:10

## 2020-08-03 RX ADMIN — TAMSULOSIN HYDROCHLORIDE 0.4 MILLIGRAM(S): 0.4 CAPSULE ORAL at 21:10

## 2020-08-03 RX ADMIN — Medication 1 PATCH: at 11:59

## 2020-08-03 RX ADMIN — Medication 1 PATCH: at 11:54

## 2020-08-03 RX ADMIN — HYDROMORPHONE HYDROCHLORIDE 1 MILLIGRAM(S): 2 INJECTION INTRAMUSCULAR; INTRAVENOUS; SUBCUTANEOUS at 16:30

## 2020-08-03 RX ADMIN — HYDROMORPHONE HYDROCHLORIDE 1 MILLIGRAM(S): 2 INJECTION INTRAMUSCULAR; INTRAVENOUS; SUBCUTANEOUS at 21:20

## 2020-08-03 RX ADMIN — HEPARIN SODIUM 5000 UNIT(S): 5000 INJECTION INTRAVENOUS; SUBCUTANEOUS at 16:33

## 2020-08-03 RX ADMIN — HYDROMORPHONE HYDROCHLORIDE 1 MILLIGRAM(S): 2 INJECTION INTRAMUSCULAR; INTRAVENOUS; SUBCUTANEOUS at 17:50

## 2020-08-03 RX ADMIN — HEPARIN SODIUM 5000 UNIT(S): 5000 INJECTION INTRAVENOUS; SUBCUTANEOUS at 21:11

## 2020-08-03 RX ADMIN — HYDROMORPHONE HYDROCHLORIDE 30 MILLILITER(S): 2 INJECTION INTRAMUSCULAR; INTRAVENOUS; SUBCUTANEOUS at 07:14

## 2020-08-03 RX ADMIN — Medication 5 MILLIGRAM(S): at 05:03

## 2020-08-03 RX ADMIN — HEPARIN SODIUM 5000 UNIT(S): 5000 INJECTION INTRAVENOUS; SUBCUTANEOUS at 05:03

## 2020-08-03 RX ADMIN — ZOLPIDEM TARTRATE 5 MILLIGRAM(S): 10 TABLET ORAL at 21:17

## 2020-08-03 RX ADMIN — HYDROMORPHONE HYDROCHLORIDE 30 MILLILITER(S): 2 INJECTION INTRAMUSCULAR; INTRAVENOUS; SUBCUTANEOUS at 03:42

## 2020-08-03 RX ADMIN — CHLORHEXIDINE GLUCONATE 1 APPLICATION(S): 213 SOLUTION TOPICAL at 05:02

## 2020-08-03 RX ADMIN — Medication 1 PATCH: at 19:33

## 2020-08-03 RX ADMIN — CARVEDILOL PHOSPHATE 3.12 MILLIGRAM(S): 80 CAPSULE, EXTENDED RELEASE ORAL at 17:49

## 2020-08-03 RX ADMIN — HYDROMORPHONE HYDROCHLORIDE 1 MILLIGRAM(S): 2 INJECTION INTRAMUSCULAR; INTRAVENOUS; SUBCUTANEOUS at 21:05

## 2020-08-03 NOTE — PROGRESS NOTE ADULT - ASSESSMENT
70 y/o M presented with SBO s/p Lap converted to open ANT Enterotomy and with small bowel resection on 7/29, now w/post-op ileus requiring NGT, clamp trail 8/2 with residual of 500cc    -NGT placed back to suction  -Replete NGT losses 0.5 to 1  -Out of bed to chair   -Encourage IS, pulling 2L  -Maintain verma for strict I & O 70 y/o M presented with SBO s/p Lap converted to open ANT Enterotomy and with small bowel resection on 7/29, now w/post-op ileus requiring NGT, clamp trail 8/2 with residual of 500cc    -clamp trial with clear liquid diet  -Replete NGT losses 0.5 to 1  -Out of bed to chair   -Encourage IS, pulling 2L  -Maintain verma for strict I & O

## 2020-08-04 LAB
ANION GAP SERPL CALC-SCNC: 16 MMOL/L — SIGNIFICANT CHANGE UP (ref 5–17)
APPEARANCE UR: ABNORMAL
BACTERIA # UR AUTO: ABNORMAL
BASOPHILS # BLD AUTO: 0.04 K/UL — SIGNIFICANT CHANGE UP (ref 0–0.2)
BASOPHILS NFR BLD AUTO: 0.3 % — SIGNIFICANT CHANGE UP (ref 0–2)
BILIRUB UR-MCNC: ABNORMAL
BUN SERPL-MCNC: 22 MG/DL — HIGH (ref 8–20)
C DIFF BY PCR RESULT: DETECTED
C DIFF TOX GENS STL QL NAA+PROBE: SIGNIFICANT CHANGE UP
CALCIUM SERPL-MCNC: 8.4 MG/DL — LOW (ref 8.6–10.2)
CHLORIDE SERPL-SCNC: 106 MMOL/L — SIGNIFICANT CHANGE UP (ref 98–107)
CO2 SERPL-SCNC: 21 MMOL/L — LOW (ref 22–29)
COLOR SPEC: ABNORMAL
COMMENT - URINE: SIGNIFICANT CHANGE UP
CREAT SERPL-MCNC: 1.22 MG/DL — SIGNIFICANT CHANGE UP (ref 0.5–1.3)
DIFF PNL FLD: ABNORMAL
EOSINOPHIL # BLD AUTO: 0.1 K/UL — SIGNIFICANT CHANGE UP (ref 0–0.5)
EOSINOPHIL NFR BLD AUTO: 0.7 % — SIGNIFICANT CHANGE UP (ref 0–6)
EPI CELLS # UR: SIGNIFICANT CHANGE UP
GLUCOSE SERPL-MCNC: 117 MG/DL — HIGH (ref 70–99)
GLUCOSE UR QL: NEGATIVE MG/DL — SIGNIFICANT CHANGE UP
GRAN CASTS # UR COMP ASSIST: ABNORMAL /LPF
HCT VFR BLD CALC: 38.8 % — LOW (ref 39–50)
HGB BLD-MCNC: 11.8 G/DL — LOW (ref 13–17)
IMM GRANULOCYTES NFR BLD AUTO: 1 % — SIGNIFICANT CHANGE UP (ref 0–1.5)
KETONES UR-MCNC: ABNORMAL
LEUKOCYTE ESTERASE UR-ACNC: ABNORMAL
LYMPHOCYTES # BLD AUTO: 0.99 K/UL — LOW (ref 1–3.3)
LYMPHOCYTES # BLD AUTO: 6.8 % — LOW (ref 13–44)
MAGNESIUM SERPL-MCNC: 1.9 MG/DL — SIGNIFICANT CHANGE UP (ref 1.6–2.6)
MCHC RBC-ENTMCNC: 26.7 PG — LOW (ref 27–34)
MCHC RBC-ENTMCNC: 30.4 GM/DL — LOW (ref 32–36)
MCV RBC AUTO: 87.8 FL — SIGNIFICANT CHANGE UP (ref 80–100)
MONOCYTES # BLD AUTO: 0.57 K/UL — SIGNIFICANT CHANGE UP (ref 0–0.9)
MONOCYTES NFR BLD AUTO: 3.9 % — SIGNIFICANT CHANGE UP (ref 2–14)
NEUTROPHILS # BLD AUTO: 12.66 K/UL — HIGH (ref 1.8–7.4)
NEUTROPHILS NFR BLD AUTO: 87.3 % — HIGH (ref 43–77)
NITRITE UR-MCNC: NEGATIVE — SIGNIFICANT CHANGE UP
PH UR: 5 — SIGNIFICANT CHANGE UP (ref 5–8)
PHOSPHATE SERPL-MCNC: 2.7 MG/DL — SIGNIFICANT CHANGE UP (ref 2.4–4.7)
PLATELET # BLD AUTO: 338 K/UL — SIGNIFICANT CHANGE UP (ref 150–400)
POTASSIUM SERPL-MCNC: 3.9 MMOL/L — SIGNIFICANT CHANGE UP (ref 3.5–5.3)
POTASSIUM SERPL-SCNC: 3.9 MMOL/L — SIGNIFICANT CHANGE UP (ref 3.5–5.3)
PROT UR-MCNC: 30 MG/DL
RBC # BLD: 4.42 M/UL — SIGNIFICANT CHANGE UP (ref 4.2–5.8)
RBC # FLD: 15 % — HIGH (ref 10.3–14.5)
RBC CASTS # UR COMP ASSIST: ABNORMAL /HPF (ref 0–4)
SODIUM SERPL-SCNC: 143 MMOL/L — SIGNIFICANT CHANGE UP (ref 135–145)
SP GR SPEC: 1.02 — SIGNIFICANT CHANGE UP (ref 1.01–1.02)
UROBILINOGEN FLD QL: NEGATIVE MG/DL — SIGNIFICANT CHANGE UP
WBC # BLD: 14.51 K/UL — HIGH (ref 3.8–10.5)
WBC # FLD AUTO: 14.51 K/UL — HIGH (ref 3.8–10.5)
WBC UR QL: ABNORMAL

## 2020-08-04 PROCEDURE — 99024 POSTOP FOLLOW-UP VISIT: CPT

## 2020-08-04 PROCEDURE — 74018 RADEX ABDOMEN 1 VIEW: CPT | Mod: 26

## 2020-08-04 PROCEDURE — 71045 X-RAY EXAM CHEST 1 VIEW: CPT | Mod: 26

## 2020-08-04 RX ORDER — SODIUM CHLORIDE 9 MG/ML
1000 INJECTION, SOLUTION INTRAVENOUS ONCE
Refills: 0 | Status: COMPLETED | OUTPATIENT
Start: 2020-08-04 | End: 2020-08-04

## 2020-08-04 RX ORDER — VANCOMYCIN HCL 1 G
125 VIAL (EA) INTRAVENOUS EVERY 6 HOURS
Refills: 0 | Status: DISCONTINUED | OUTPATIENT
Start: 2020-08-04 | End: 2020-08-05

## 2020-08-04 RX ORDER — CARVEDILOL PHOSPHATE 80 MG/1
6.25 CAPSULE, EXTENDED RELEASE ORAL EVERY 12 HOURS
Refills: 0 | Status: DISCONTINUED | OUTPATIENT
Start: 2020-08-04 | End: 2020-08-04

## 2020-08-04 RX ORDER — SODIUM,POTASSIUM PHOSPHATES 278-250MG
1 POWDER IN PACKET (EA) ORAL ONCE
Refills: 0 | Status: COMPLETED | OUTPATIENT
Start: 2020-08-04 | End: 2020-08-04

## 2020-08-04 RX ORDER — CARVEDILOL PHOSPHATE 80 MG/1
6.25 CAPSULE, EXTENDED RELEASE ORAL EVERY 12 HOURS
Refills: 0 | Status: DISCONTINUED | OUTPATIENT
Start: 2020-08-04 | End: 2020-08-05

## 2020-08-04 RX ADMIN — HEPARIN SODIUM 5000 UNIT(S): 5000 INJECTION INTRAVENOUS; SUBCUTANEOUS at 12:06

## 2020-08-04 RX ADMIN — SODIUM CHLORIDE 500 MILLILITER(S): 9 INJECTION, SOLUTION INTRAVENOUS at 16:35

## 2020-08-04 RX ADMIN — CARVEDILOL PHOSPHATE 3.12 MILLIGRAM(S): 80 CAPSULE, EXTENDED RELEASE ORAL at 06:20

## 2020-08-04 RX ADMIN — Medication 600 MILLIGRAM(S): at 03:32

## 2020-08-04 RX ADMIN — HYDROMORPHONE HYDROCHLORIDE 1 MILLIGRAM(S): 2 INJECTION INTRAMUSCULAR; INTRAVENOUS; SUBCUTANEOUS at 12:05

## 2020-08-04 RX ADMIN — Medication 600 MILLIGRAM(S): at 23:59

## 2020-08-04 RX ADMIN — SODIUM CHLORIDE 250 MILLILITER(S): 9 INJECTION, SOLUTION INTRAVENOUS at 12:05

## 2020-08-04 RX ADMIN — HYDROMORPHONE HYDROCHLORIDE 1 MILLIGRAM(S): 2 INJECTION INTRAMUSCULAR; INTRAVENOUS; SUBCUTANEOUS at 12:20

## 2020-08-04 RX ADMIN — ATORVASTATIN CALCIUM 40 MILLIGRAM(S): 80 TABLET, FILM COATED ORAL at 21:56

## 2020-08-04 RX ADMIN — Medication 1 PATCH: at 19:26

## 2020-08-04 RX ADMIN — Medication 1 PATCH: at 12:16

## 2020-08-04 RX ADMIN — TAMSULOSIN HYDROCHLORIDE 0.4 MILLIGRAM(S): 0.4 CAPSULE ORAL at 21:56

## 2020-08-04 RX ADMIN — HEPARIN SODIUM 5000 UNIT(S): 5000 INJECTION INTRAVENOUS; SUBCUTANEOUS at 05:30

## 2020-08-04 RX ADMIN — Medication 1 PATCH: at 08:16

## 2020-08-04 RX ADMIN — CARVEDILOL PHOSPHATE 6.25 MILLIGRAM(S): 80 CAPSULE, EXTENDED RELEASE ORAL at 15:19

## 2020-08-04 RX ADMIN — Medication 81 MILLIGRAM(S): at 12:05

## 2020-08-04 RX ADMIN — Medication 1 TABLET(S): at 15:19

## 2020-08-04 RX ADMIN — Medication 125 MILLIGRAM(S): at 18:12

## 2020-08-04 RX ADMIN — CHLORHEXIDINE GLUCONATE 1 APPLICATION(S): 213 SOLUTION TOPICAL at 05:30

## 2020-08-04 RX ADMIN — HEPARIN SODIUM 5000 UNIT(S): 5000 INJECTION INTRAVENOUS; SUBCUTANEOUS at 21:56

## 2020-08-04 RX ADMIN — Medication 1 PATCH: at 12:05

## 2020-08-04 RX ADMIN — Medication 600 MILLIGRAM(S): at 04:30

## 2020-08-04 NOTE — PROVIDER CONTACT NOTE (CRITICAL VALUE NOTIFICATION) - ASSESSMENT
A&Ox4. VSS. He denies chest pain or SOB. He does not show any s/s of distress or discomfort. Pt has had 5 watery BMs this shift thus far.

## 2020-08-04 NOTE — PROGRESS NOTE ADULT - SUBJECTIVE AND OBJECTIVE BOX
HPI/OVERNIGHT EVENTS:    HDS, TARIQ, no complains. NGT discontinued today. Denies n/v/f/c CP or SOB.    MEDICATIONS  (STANDING):  aspirin  chewable 81 milliGRAM(s) Oral daily  atorvastatin 40 milliGRAM(s) Oral at bedtime  carvedilol 3.125 milliGRAM(s) Oral every 12 hours  chlorhexidine 4% Liquid 1 Application(s) Topical <User Schedule>  enalapril 10 milliGRAM(s) Oral daily  heparin   Injectable 5000 Unit(s) SubCutaneous every 8 hours  metoprolol tartrate Injectable 5 milliGRAM(s) IV Push every 6 hours  nicotine -  14 mG/24Hr(s) Patch 1 patch Transdermal daily  tamsulosin 0.4 milliGRAM(s) Oral at bedtime    MEDICATIONS  (PRN):  HYDROmorphone  Injectable 0.5 milliGRAM(s) IV Push every 4 hours PRN Moderate Pain (4 - 6)  HYDROmorphone  Injectable 1 milliGRAM(s) IV Push every 4 hours PRN Severe Pain (7 - 10)  ibuprofen  Tablet. 600 milliGRAM(s) Oral three times a day PRN Mild Pain (1 - 3)  naloxone Injectable 0.1 milliGRAM(s) IV Push every 3 minutes PRN For ANY of the following changes in patient status:  A. RR LESS THAN 10 breaths per minute, B. Oxygen saturation LESS THAN 90%, C. Sedation score of 6  ondansetron Injectable 4 milliGRAM(s) IV Push every 6 hours PRN Nausea  zolpidem 5 milliGRAM(s) Oral at bedtime PRN Insomnia      Vital Signs Last 24 Hrs  T(C): 36.8 (03 Aug 2020 23:55), Max: 37.1 (03 Aug 2020 04:56)  T(F): 98.3 (03 Aug 2020 23:55), Max: 98.8 (03 Aug 2020 20:52)  HR: 107 (03 Aug 2020 23:55) (65 - 107)  BP: 100/65 (03 Aug 2020 23:55) (100/65 - 123/73)  BP(mean): --  RR: 18 (03 Aug 2020 23:55) (18 - 18)  SpO2: 95% (03 Aug 2020 23:55) (92% - 95%)    General: lying in bed in no acute distress  HEENT: NGT in place with high output bilious. Neck supple  Chest: non-labored breathing or conversational dyspnea   Abdomen: non-distended, incision sites well healed, no surrounding erythema nor purulent drainage, soft and depressible, non-tender. No guarding or rebound, non-peritonitic.  Ext: no edema or cyanosis        I&O's Detail    02 Aug 2020 07:01  -  03 Aug 2020 07:00  --------------------------------------------------------  IN:    dextrose 5% + sodium chloride 0.45%: 1725 mL    Lactated Ringers IV Bolus: 620 mL  Total IN: 2345 mL    OUT:    Indwelling Catheter - Urethral: 1175 mL    Nasoenteral Tube: 1150 mL  Total OUT: 2325 mL    Total NET: 20 mL      03 Aug 2020 07:01  -  04 Aug 2020 02:44  --------------------------------------------------------  IN:  Total IN: 0 mL    OUT:    Indwelling Catheter - Urethral: 850 mL    Nasoenteral Tube: 900 mL  Total OUT: 1750 mL    Total NET: -1750 mL          LABS:                        11.3   11.99 )-----------( 333      ( 03 Aug 2020 06:03 )             36.8     08-03    147<H>  |  109<H>  |  15.0  ----------------------------<  100<H>  4.8   |  23.0  |  1.05    Ca    8.5<L>      03 Aug 2020 06:03  Phos  2.8     08-03  Mg     2.0     08-03

## 2020-08-04 NOTE — PROGRESS NOTE ADULT - ASSESSMENT
70 y/o M presented with SBO s/p Lap converted to open ANT Enterotomy and with small bowel resection on 7/29, now w/post-op ileus NGT discontinued on 8/3. Tolerating CLD.     Plan:     -Out of bed to chair   -Encourage IS, pulling 2L  -Maintain verma for strict I & O  - On ASA and SQH.     DISPO pending Diet tolerance and discharge.

## 2020-08-04 NOTE — CHART NOTE - NSCHARTNOTEFT_GEN_A_CORE
patient seen and examined with Dr cardenas. patient c/o loose watery BMs throughout the night. Review of labs with some evidence of dehydration. Overall patient does not appear toxic or ill. However noted increase in WBC with shift. Will order CXR, UA and cdiff per Dr cardenas. Gentle hydration ordered. will continue to monitor and reassess patient.

## 2020-08-04 NOTE — PROGRESS NOTE ADULT - SUBJECTIVE AND OBJECTIVE BOX
INTERVAL HPI/OVERNIGHT EVENTS:  Dye draining well.    MEDICATIONS  (STANDING):  aspirin  chewable 81 milliGRAM(s) Oral daily  atorvastatin 40 milliGRAM(s) Oral at bedtime  carvedilol 6.25 milliGRAM(s) Oral every 12 hours  chlorhexidine 4% Liquid 1 Application(s) Topical <User Schedule>  enalapril 10 milliGRAM(s) Oral daily  heparin   Injectable 5000 Unit(s) SubCutaneous every 8 hours  metoprolol tartrate Injectable 5 milliGRAM(s) IV Push every 6 hours  multiple electrolytes Injection Type 1 Bolus 1000 milliLiter(s) IV Bolus once  nicotine -  14 mG/24Hr(s) Patch 1 patch Transdermal daily  potassium phosphate / sodium phosphate Tablet (K-PHOS No. 2) 1 Tablet(s) Oral once  tamsulosin 0.4 milliGRAM(s) Oral at bedtime    MEDICATIONS  (PRN):  ibuprofen  Tablet. 600 milliGRAM(s) Oral three times a day PRN Mild Pain (1 - 3)  naloxone Injectable 0.1 milliGRAM(s) IV Push every 3 minutes PRN For ANY of the following changes in patient status:  A. RR LESS THAN 10 breaths per minute, B. Oxygen saturation LESS THAN 90%, C. Sedation score of 6  ondansetron Injectable 4 milliGRAM(s) IV Push every 6 hours PRN Nausea  zolpidem 5 milliGRAM(s) Oral at bedtime PRN Insomnia      Allergies    cucumber (Anaphylaxis; Hives; Urticaria; Short breath)  No Known Drug Allergies    Intolerances        Vital Signs Last 24 Hrs  T(C): 36.6 (04 Aug 2020 08:36), Max: 37.1 (03 Aug 2020 20:52)  T(F): 97.8 (04 Aug 2020 08:36), Max: 98.8 (03 Aug 2020 20:52)  HR: 111 (04 Aug 2020 12:03) (65 - 115)  BP: 92/63 (04 Aug 2020 12:03) (92/63 - 123/73)  BP(mean): --  RR: 18 (04 Aug 2020 08:36) (17 - 18)  SpO2: 96% (04 Aug 2020 08:36) (94% - 96%)     ON PE:  General: alert and awake  Abdomen:  soft, nt, nd, bs+  :  Dye with clear yellow urine    LABS:                        11.8   14.51 )-----------( 338      ( 04 Aug 2020 05:56 )             38.8     08-04    143  |  106  |  22.0<H>  ----------------------------<  117<H>  3.9   |  21.0<L>  |  1.22    Ca    8.4<L>      04 Aug 2020 05:56  Phos  2.7     08-04  Mg     1.9     08-04        Urinalysis Basic - ( 04 Aug 2020 10:50 )    Color: Amie / Appearance: Slightly Turbid / S.025 / pH: x  Gluc: x / Ketone: Trace  / Bili: Moderate / Urobili: Negative mg/dL   Blood: x / Protein: 30 mg/dL / Nitrite: Negative   Leuk Esterase: Moderate / RBC: 11-25 /HPF / WBC 11-25   Sq Epi: x / Non Sq Epi: Occasional / Bacteria: Few        RADIOLOGY & ADDITIONAL TESTS:

## 2020-08-04 NOTE — PROGRESS NOTE ADULT - ASSESSMENT
Urinary retention.  Verma placed with cystoscopy      1.  flomax  2.  continue verma  3.  TOV next week in  office  4.  Please DC with the verma  5.  will sign off

## 2020-08-05 ENCOUNTER — TRANSCRIPTION ENCOUNTER (OUTPATIENT)
Age: 70
End: 2020-08-05

## 2020-08-05 VITALS
OXYGEN SATURATION: 99 % | SYSTOLIC BLOOD PRESSURE: 99 MMHG | RESPIRATION RATE: 18 BRPM | HEART RATE: 99 BPM | DIASTOLIC BLOOD PRESSURE: 67 MMHG | TEMPERATURE: 98 F

## 2020-08-05 LAB
ANION GAP SERPL CALC-SCNC: 15 MMOL/L — SIGNIFICANT CHANGE UP (ref 5–17)
BASOPHILS # BLD AUTO: 0.02 K/UL — SIGNIFICANT CHANGE UP (ref 0–0.2)
BASOPHILS NFR BLD AUTO: 0.1 % — SIGNIFICANT CHANGE UP (ref 0–2)
BUN SERPL-MCNC: 19 MG/DL — SIGNIFICANT CHANGE UP (ref 8–20)
CALCIUM SERPL-MCNC: 7.7 MG/DL — LOW (ref 8.6–10.2)
CHLORIDE SERPL-SCNC: 104 MMOL/L — SIGNIFICANT CHANGE UP (ref 98–107)
CO2 SERPL-SCNC: 19 MMOL/L — LOW (ref 22–29)
CREAT SERPL-MCNC: 1.17 MG/DL — SIGNIFICANT CHANGE UP (ref 0.5–1.3)
EOSINOPHIL # BLD AUTO: 0.12 K/UL — SIGNIFICANT CHANGE UP (ref 0–0.5)
EOSINOPHIL NFR BLD AUTO: 0.9 % — SIGNIFICANT CHANGE UP (ref 0–6)
GLUCOSE SERPL-MCNC: 94 MG/DL — SIGNIFICANT CHANGE UP (ref 70–99)
HCT VFR BLD CALC: 34.9 % — LOW (ref 39–50)
HGB BLD-MCNC: 10.9 G/DL — LOW (ref 13–17)
IMM GRANULOCYTES NFR BLD AUTO: 1.2 % — SIGNIFICANT CHANGE UP (ref 0–1.5)
LYMPHOCYTES # BLD AUTO: 1.21 K/UL — SIGNIFICANT CHANGE UP (ref 1–3.3)
LYMPHOCYTES # BLD AUTO: 9 % — LOW (ref 13–44)
MAGNESIUM SERPL-MCNC: 2 MG/DL — SIGNIFICANT CHANGE UP (ref 1.6–2.6)
MCHC RBC-ENTMCNC: 27.4 PG — SIGNIFICANT CHANGE UP (ref 27–34)
MCHC RBC-ENTMCNC: 31.2 GM/DL — LOW (ref 32–36)
MCV RBC AUTO: 87.7 FL — SIGNIFICANT CHANGE UP (ref 80–100)
MONOCYTES # BLD AUTO: 0.54 K/UL — SIGNIFICANT CHANGE UP (ref 0–0.9)
MONOCYTES NFR BLD AUTO: 4 % — SIGNIFICANT CHANGE UP (ref 2–14)
NEUTROPHILS # BLD AUTO: 11.4 K/UL — HIGH (ref 1.8–7.4)
NEUTROPHILS NFR BLD AUTO: 84.8 % — HIGH (ref 43–77)
PHOSPHATE SERPL-MCNC: 2.6 MG/DL — SIGNIFICANT CHANGE UP (ref 2.4–4.7)
PLATELET # BLD AUTO: 326 K/UL — SIGNIFICANT CHANGE UP (ref 150–400)
POTASSIUM SERPL-MCNC: 3.7 MMOL/L — SIGNIFICANT CHANGE UP (ref 3.5–5.3)
POTASSIUM SERPL-SCNC: 3.7 MMOL/L — SIGNIFICANT CHANGE UP (ref 3.5–5.3)
RBC # BLD: 3.98 M/UL — LOW (ref 4.2–5.8)
RBC # FLD: 15.1 % — HIGH (ref 10.3–14.5)
SODIUM SERPL-SCNC: 138 MMOL/L — SIGNIFICANT CHANGE UP (ref 135–145)
WBC # BLD: 13.45 K/UL — HIGH (ref 3.8–10.5)
WBC # FLD AUTO: 13.45 K/UL — HIGH (ref 3.8–10.5)

## 2020-08-05 PROCEDURE — 97530 THERAPEUTIC ACTIVITIES: CPT

## 2020-08-05 PROCEDURE — 80053 COMPREHEN METABOLIC PANEL: CPT

## 2020-08-05 PROCEDURE — 84156 ASSAY OF PROTEIN URINE: CPT

## 2020-08-05 PROCEDURE — 82435 ASSAY OF BLOOD CHLORIDE: CPT

## 2020-08-05 PROCEDURE — 85027 COMPLETE CBC AUTOMATED: CPT

## 2020-08-05 PROCEDURE — 84295 ASSAY OF SERUM SODIUM: CPT

## 2020-08-05 PROCEDURE — 87635 SARS-COV-2 COVID-19 AMP PRB: CPT

## 2020-08-05 PROCEDURE — 99222 1ST HOSP IP/OBS MODERATE 55: CPT

## 2020-08-05 PROCEDURE — 82010 KETONE BODYS QUAN: CPT

## 2020-08-05 PROCEDURE — 96374 THER/PROPH/DIAG INJ IV PUSH: CPT

## 2020-08-05 PROCEDURE — 96375 TX/PRO/DX INJ NEW DRUG ADDON: CPT

## 2020-08-05 PROCEDURE — 97116 GAIT TRAINING THERAPY: CPT

## 2020-08-05 PROCEDURE — 84300 ASSAY OF URINE SODIUM: CPT

## 2020-08-05 PROCEDURE — 99285 EMERGENCY DEPT VISIT HI MDM: CPT | Mod: 25

## 2020-08-05 PROCEDURE — 96376 TX/PRO/DX INJ SAME DRUG ADON: CPT

## 2020-08-05 PROCEDURE — 80048 BASIC METABOLIC PNL TOTAL CA: CPT

## 2020-08-05 PROCEDURE — 88307 TISSUE EXAM BY PATHOLOGIST: CPT

## 2020-08-05 PROCEDURE — 86850 RBC ANTIBODY SCREEN: CPT

## 2020-08-05 PROCEDURE — 82436 ASSAY OF URINE CHLORIDE: CPT

## 2020-08-05 PROCEDURE — 76775 US EXAM ABDO BACK WALL LIM: CPT

## 2020-08-05 PROCEDURE — 82803 BLOOD GASES ANY COMBINATION: CPT

## 2020-08-05 PROCEDURE — 82330 ASSAY OF CALCIUM: CPT

## 2020-08-05 PROCEDURE — 87493 C DIFF AMPLIFIED PROBE: CPT

## 2020-08-05 PROCEDURE — 87086 URINE CULTURE/COLONY COUNT: CPT

## 2020-08-05 PROCEDURE — 74018 RADEX ABDOMEN 1 VIEW: CPT

## 2020-08-05 PROCEDURE — 82947 ASSAY GLUCOSE BLOOD QUANT: CPT

## 2020-08-05 PROCEDURE — 84132 ASSAY OF SERUM POTASSIUM: CPT

## 2020-08-05 PROCEDURE — 86769 SARS-COV-2 COVID-19 ANTIBODY: CPT

## 2020-08-05 PROCEDURE — 85014 HEMATOCRIT: CPT

## 2020-08-05 PROCEDURE — 82040 ASSAY OF SERUM ALBUMIN: CPT

## 2020-08-05 PROCEDURE — 86901 BLOOD TYPING SEROLOGIC RH(D): CPT

## 2020-08-05 PROCEDURE — 74176 CT ABD & PELVIS W/O CONTRAST: CPT

## 2020-08-05 PROCEDURE — 99024 POSTOP FOLLOW-UP VISIT: CPT

## 2020-08-05 PROCEDURE — 82550 ASSAY OF CK (CPK): CPT

## 2020-08-05 PROCEDURE — 93005 ELECTROCARDIOGRAM TRACING: CPT

## 2020-08-05 PROCEDURE — 83874 ASSAY OF MYOGLOBIN: CPT

## 2020-08-05 PROCEDURE — 80069 RENAL FUNCTION PANEL: CPT

## 2020-08-05 PROCEDURE — 82570 ASSAY OF URINE CREATININE: CPT

## 2020-08-05 PROCEDURE — 82009 KETONE BODYS QUAL: CPT

## 2020-08-05 PROCEDURE — 83690 ASSAY OF LIPASE: CPT

## 2020-08-05 PROCEDURE — 85610 PROTHROMBIN TIME: CPT

## 2020-08-05 PROCEDURE — 81001 URINALYSIS AUTO W/SCOPE: CPT

## 2020-08-05 PROCEDURE — 83735 ASSAY OF MAGNESIUM: CPT

## 2020-08-05 PROCEDURE — 74177 CT ABD & PELVIS W/CONTRAST: CPT

## 2020-08-05 PROCEDURE — 83605 ASSAY OF LACTIC ACID: CPT

## 2020-08-05 PROCEDURE — 71045 X-RAY EXAM CHEST 1 VIEW: CPT

## 2020-08-05 PROCEDURE — 36415 COLL VENOUS BLD VENIPUNCTURE: CPT

## 2020-08-05 PROCEDURE — 85730 THROMBOPLASTIN TIME PARTIAL: CPT

## 2020-08-05 PROCEDURE — 86803 HEPATITIS C AB TEST: CPT

## 2020-08-05 PROCEDURE — 97163 PT EVAL HIGH COMPLEX 45 MIN: CPT

## 2020-08-05 PROCEDURE — 83935 ASSAY OF URINE OSMOLALITY: CPT

## 2020-08-05 PROCEDURE — 86900 BLOOD TYPING SEROLOGIC ABO: CPT

## 2020-08-05 PROCEDURE — 84100 ASSAY OF PHOSPHORUS: CPT

## 2020-08-05 RX ORDER — IBUPROFEN 200 MG
1 TABLET ORAL
Qty: 0 | Refills: 0 | DISCHARGE
Start: 2020-08-05

## 2020-08-05 RX ORDER — VANCOMYCIN HCL 1 G
1 VIAL (EA) INTRAVENOUS
Qty: 56 | Refills: 0
Start: 2020-08-05 | End: 2020-08-18

## 2020-08-05 RX ORDER — POTASSIUM CHLORIDE 20 MEQ
20 PACKET (EA) ORAL ONCE
Refills: 0 | Status: COMPLETED | OUTPATIENT
Start: 2020-08-05 | End: 2020-08-05

## 2020-08-05 RX ORDER — VANCOMYCIN HCL 1 G
1 VIAL (EA) INTRAVENOUS
Qty: 40 | Refills: 0
Start: 2020-08-05 | End: 2020-08-14

## 2020-08-05 RX ORDER — POTASSIUM CHLORIDE 20 MEQ
10 PACKET (EA) ORAL ONCE
Refills: 0 | Status: COMPLETED | OUTPATIENT
Start: 2020-08-05 | End: 2020-08-05

## 2020-08-05 RX ORDER — TAMSULOSIN HYDROCHLORIDE 0.4 MG/1
1 CAPSULE ORAL
Qty: 0 | Refills: 0 | DISCHARGE
Start: 2020-08-05

## 2020-08-05 RX ORDER — ASPIRIN/CALCIUM CARB/MAGNESIUM 324 MG
1 TABLET ORAL
Qty: 0 | Refills: 0 | DISCHARGE
Start: 2020-08-05

## 2020-08-05 RX ADMIN — Medication 1 PATCH: at 07:57

## 2020-08-05 RX ADMIN — Medication 125 MILLIGRAM(S): at 06:01

## 2020-08-05 RX ADMIN — HEPARIN SODIUM 5000 UNIT(S): 5000 INJECTION INTRAVENOUS; SUBCUTANEOUS at 06:01

## 2020-08-05 RX ADMIN — CARVEDILOL PHOSPHATE 6.25 MILLIGRAM(S): 80 CAPSULE, EXTENDED RELEASE ORAL at 06:01

## 2020-08-05 RX ADMIN — HEPARIN SODIUM 5000 UNIT(S): 5000 INJECTION INTRAVENOUS; SUBCUTANEOUS at 14:30

## 2020-08-05 RX ADMIN — Medication 1 PATCH: at 11:17

## 2020-08-05 RX ADMIN — Medication 10 MILLIEQUIVALENT(S): at 11:19

## 2020-08-05 RX ADMIN — Medication 125 MILLIGRAM(S): at 00:01

## 2020-08-05 RX ADMIN — Medication 125 MILLIGRAM(S): at 11:18

## 2020-08-05 RX ADMIN — Medication 20 MILLIEQUIVALENT(S): at 11:21

## 2020-08-05 RX ADMIN — CHLORHEXIDINE GLUCONATE 1 APPLICATION(S): 213 SOLUTION TOPICAL at 06:02

## 2020-08-05 RX ADMIN — ZOLPIDEM TARTRATE 5 MILLIGRAM(S): 10 TABLET ORAL at 00:00

## 2020-08-05 RX ADMIN — Medication 600 MILLIGRAM(S): at 00:49

## 2020-08-05 RX ADMIN — Medication 81 MILLIGRAM(S): at 11:18

## 2020-08-05 RX ADMIN — Medication 10 MILLIGRAM(S): at 06:01

## 2020-08-05 RX ADMIN — Medication 1 PATCH: at 11:19

## 2020-08-05 NOTE — DISCHARGE NOTE PROVIDER - HOSPITAL COURSE
Mr. Edgar is a 69M with a PMHx HF s/p AICD/PPM, prostate ca s/p radical prostatectomy, urinary incontinence, HLD, HTN, insomnia, chronic pain syndrome from lumbar disc herniations who presents with a 1-week history of abdominal bloating, nausea, vomiting, and loose dark stools. Patient initially attributed his symptoms to food poising, however abdominal discomfort did not improve with conservative measures. His abdominal pain is rated as a 6/10 and is described as a "knot-like" feeling in his umbilicus. It is associated with decreased appetite. No fevers, chills, CP, or SOB.         Patient was admitted to the Surgery department and underwent a cystoscopy with serial dilations to 16fr by Urology. Patient developed an ZANE that was later resolved during the admission. On 7/29 patient underwent a laparoscopic converted to open exploratory laparotomy with ANT and enterotomy with small bowel resection.     Patient was advanced the diet slowly to a regulat diet until he tolerated and was having bowel function. Patient WBC normalized during the subsequent days. Patient was diagnosed with Cdiff during this admission and was treated with PO Vanco. Patient will be discharged with the verma and a leg bag. As per ID recommendations the patient is being discharged on PO Vanco.     Patient was discharged on 8/5 tolerating a regular diet and ambulating, with a verma and f/u with Urology, Cardiology and General Surgery.

## 2020-08-05 NOTE — DISCHARGE NOTE PROVIDER - NSDCCPTREATMENT_GEN_ALL_CORE_FT
PRINCIPAL PROCEDURE  Procedure: Small bowel resection  Findings and Treatment: Ex lap with ANT and SBR.

## 2020-08-05 NOTE — PROGRESS NOTE ADULT - REASON FOR ADMISSION
High Grade SBO

## 2020-08-05 NOTE — CONSULT NOTE ADULT - REASON FOR ADMISSION
High Grade SBO

## 2020-08-05 NOTE — PROGRESS NOTE ADULT - SUBJECTIVE AND OBJECTIVE BOX
PROGRESS NOTE  This is a 69M with SBO s/p lap converted to open exlap with ANT, enterotomy w/ SBR, seen and examined at bedside. Pt reports some episodes of diarrhea mixed with medium-soft stools today. Denies n/v/f/SOB/CP. Pain well controlled, tolerating regular diet. Spoke with pt's wife about her concerns about her 's condition regarding cdiff infection (Kirsten [phone]).    MEDICATIONS  (STANDING):  aspirin  chewable 81 milliGRAM(s) Oral daily  atorvastatin 40 milliGRAM(s) Oral at bedtime  carvedilol 6.25 milliGRAM(s) Oral every 12 hours  chlorhexidine 4% Liquid 1 Application(s) Topical <User Schedule>  enalapril 10 milliGRAM(s) Oral daily  heparin   Injectable 5000 Unit(s) SubCutaneous every 8 hours  metoprolol tartrate Injectable 5 milliGRAM(s) IV Push every 6 hours  nicotine -  14 mG/24Hr(s) Patch 1 patch Transdermal daily  tamsulosin 0.4 milliGRAM(s) Oral at bedtime  vancomycin    Solution 125 milliGRAM(s) Oral every 6 hours    MEDICATIONS  (PRN):  ibuprofen  Tablet. 600 milliGRAM(s) Oral three times a day PRN Mild Pain (1 - 3)  naloxone Injectable 0.1 milliGRAM(s) IV Push every 3 minutes PRN For ANY of the following changes in patient status:  A. RR LESS THAN 10 breaths per minute, B. Oxygen saturation LESS THAN 90%, C. Sedation score of 6  ondansetron Injectable 4 milliGRAM(s) IV Push every 6 hours PRN Nausea  zolpidem 5 milliGRAM(s) Oral at bedtime PRN Insomnia      Vital Signs Last 24 Hrs  T(C): 36.9 (04 Aug 2020 23:50), Max: 37.2 (04 Aug 2020 19:57)  T(F): 98.4 (04 Aug 2020 23:50), Max: 98.9 (04 Aug 2020 19:57)  HR: 111 (04 Aug 2020 23:50) (103 - 115)  BP: 108/70 (04 Aug 2020 23:50) (92/63 - 108/70)  BP(mean): --  RR: 18 (04 Aug 2020 23:50) (17 - 18)  SpO2: 96% (04 Aug 2020 23:50) (94% - 97%)    Constitutional: NAD, well-groomed, well-developed  HEENT: PERRLA, EOMI  Respiratory: no accessory muscle use  Cardiovascular: Regular rate & rhythm, normal S1, S2  Gastrointestinal: Soft, non-tender, dressings c/d/i  Neurological: GCS: 15. A&O x 3;   Psychiatric: Normal mood, normal affect        I&O's Detail    03 Aug 2020 07:01  -  04 Aug 2020 07:00  --------------------------------------------------------  IN:  Total IN: 0 mL    OUT:    Indwelling Catheter - Urethral: 950 mL    Nasoenteral Tube: 900 mL  Total OUT: 1850 mL    Total NET: -1850 mL      04 Aug 2020 07:01  -  05 Aug 2020 02:00  --------------------------------------------------------  IN:  Total IN: 0 mL    OUT:    Indwelling Catheter - Urethral: 300 mL  Total OUT: 300 mL    Total NET: -300 mL          LABS:                        11.8   14.51 )-----------( 338      ( 04 Aug 2020 05:56 )             38.8     08-04    143  |  106  |  22.0<H>  ----------------------------<  117<H>  3.9   |  21.0<L>  |  1.22    Ca    8.4<L>      04 Aug 2020 05:56  Phos  2.7     08-04  Mg     1.9     08-04        Urinalysis Basic - ( 04 Aug 2020 10:50 )    Color: Amie / Appearance: Slightly Turbid / S.025 / pH: x  Gluc: x / Ketone: Trace  / Bili: Moderate / Urobili: Negative mg/dL   Blood: x / Protein: 30 mg/dL / Nitrite: Negative   Leuk Esterase: Moderate / RBC: 11-25 /HPF / WBC 11-25   Sq Epi: x / Non Sq Epi: Occasional / Bacteria: Few

## 2020-08-05 NOTE — DISCHARGE NOTE PROVIDER - NSDCCPCAREPLAN_GEN_ALL_CORE_FT
PRINCIPAL DISCHARGE DIAGNOSIS  Diagnosis: Small bowel obstruction  Assessment and Plan of Treatment: Ex lap with ANT and enterotomy with SBR. Tolerated surgery well, diet advanced to Regular and discharged having Bowel function and tolerating regular diet      SECONDARY DISCHARGE DIAGNOSES  Diagnosis: Acute renal failure, unspecified acute renal failure type  Assessment and Plan of Treatment: resolved during the admission

## 2020-08-05 NOTE — DISCHARGE NOTE PROVIDER - CARE PROVIDERS DIRECT ADDRESSES
,DirectAddress_Unknown,DirectAddress_Unknown,DirectAddress_Unknown,[email or web address]

## 2020-08-05 NOTE — DISCHARGE NOTE NURSING/CASE MANAGEMENT/SOCIAL WORK - PATIENT PORTAL LINK FT
You can access the FollowMyHealth Patient Portal offered by [facility] by registering at the following website: [email or web address]. By joining [employer]’s FollowMyHealth portal, you will also be able to view your health information using other applications (apps) compatible with our system.

## 2020-08-05 NOTE — DISCHARGE NOTE PROVIDER - NSDCMRMEDTOKEN_GEN_ALL_CORE_FT
aspirin 81 mg oral tablet, chewable: 1 tab(s) orally once a day  atorvastatin 40 mg oral tablet: 1 tab(s) orally once a day  carvedilol 12.5 mg oral tablet: 1 tab(s) orally 2 times a day  enalapril 10 mg oral tablet: 1  orally 2 times a day  ibuprofen 600 mg oral tablet: 1 tab(s) orally 3 times a day, As needed, Mild Pain (1 - 3)  tamsulosin 0.4 mg oral capsule: 1 cap(s) orally once a day (at bedtime)  vancomycin 125 mg oral capsule: 1 cap(s) orally every 6 hours   Vicodin ES 7.5 mg-300 mg oral tablet: 1 tab(s) orally every 4 hours, As Needed  zolpidem 10 mg oral tablet: 1 tab(s) orally once a day (at bedtime)

## 2020-08-05 NOTE — PROGRESS NOTE ADULT - ASSESSMENT
his is a 69M with SBO s/p lap converted to open exlap with ANT, enterotomy w/ SBR, seen and examined at bedside.  - cdiff (+) 8/4 his is a 69M with SBO s/p lap converted to open exlap with ANT, enterotomy w/ SBR, seen and examined at bedside.  - cdiff (+) 8/4 started on PO vanc 125, for 14 days  - f/u ID   - f/u CBC  - c/w reg diet  - dc with verma and leg bag This is a 69M with SBO s/p lap converted to open exlap with ANT, enterotomy w/ SBR, seen and examined at bedside.  - cdiff (+) 8/4 started on PO vanc 125, for 14 days  - f/u ID   - f/u CBC  - c/w reg diet  - dc with verma and leg bag

## 2020-08-05 NOTE — DISCHARGE NOTE PROVIDER - PROVIDER TOKENS
PROVIDER:[TOKEN:[[id number]],FOLLOWUP:[1-3 days]],PROVIDER:[TOKEN:[[id number]],FOLLOWUP:[1 week]],PROVIDER:[TOKEN:[[id number]],FOLLOWUP:[1 week]],PROVIDER:[TOKEN:[[id number]],FOLLOWUP:[2 weeks]]

## 2020-08-05 NOTE — CONSULT NOTE ADULT - ASSESSMENT
Mr. Edgar is a 69M with a PMHx HF s/p AICD/PPM, prostate ca s/p radical prostatectomy, urinary incontinence, HLD, HTN, insomnia, chronic pain syndrome from lumbar disc herniations who presents with a 1-week history of abdominal bloating, nausea, vomiting, and loose dark stools. Patient initially attributed his symptoms to food poising, however abdominal discomfort did not improve with conservative measures. His abdominal pain is rated as a 6/10 and is described as a "knot-like" feeling in his umbilicus. It is associated with decreased appetite. No fevers, chills, CP, or SOB. (27 Jul 2020 00:55)    He presented with  SBO s/p Lap converted to open ANT Enterotomy and with small bowel resection on 7/29, now w/post-op ileus. NGT discontinued on 8/3.    post procedure, had loose BM;  checked for C diff and was positive.      NO fevers noted.  patient reports that his Bowels movements are semi-formed in oatmeal consistency.     Urology note appreciated.  Verma had been placed under cystoscopy for inability to pass urine.     Denies recent antibiotics at home.    He did mention that his symptoms began after eating some bad Chinese food - steak and peppers.     Impression:  C diff diarrhea  SBO s/p bowel resection  verma in place      Plan:    for C diff.   clinically better.   Please discharge patient with 14 days of VANCOMYCIN 125mg PO Q6H    patient can follow up with me in office in 10 - 14 days.

## 2020-08-05 NOTE — CONSULT NOTE ADULT - SUBJECTIVE AND OBJECTIVE BOX
[facility]  INFECTIOUS DISEASES AND INTERNAL MEDICINE at [place]  =======================================================  Norm Talamantes MD  Diplomates American Board of Internal Medicine and Infectious Diseases  Tel  [phone]  Fax [phone]  =======================================================    MRN-[id number]  JAYE GRANT   HPI:  Mr. Grant is a 69M with a PMHx HF s/p AICD/PPM, prostate ca s/p radical prostatectomy, urinary incontinence, HLD, HTN, insomnia, chronic pain syndrome from lumbar disc herniations who presents with a 1-week history of abdominal bloating, nausea, vomiting, and loose dark stools. Patient initially attributed his symptoms to food poising, however abdominal discomfort did not improve with conservative measures. His abdominal pain is rated as a 6/10 and is described as a "knot-like" feeling in his umbilicus. It is associated with decreased appetite. No fevers, chills, CP, or SOB. (27 Jul 2020 00:55)    He presented with  SBO s/p Lap converted to open ANT Enterotomy and with small bowel resection on 7/29, now w/post-op ileus. NGT discontinued on 8/3.    post procedure, had loose BM;  checked for C diff and was positive.      NO fevers noted.  patient reports that his Bowels movements are semi-formed in oatmeal consistency.     Urology note appreciated.  Verma had been placed under cystoscopy for inability to pass urine.     Denies recent antibiotics at home.    He did mention that his symptoms began after eating some bad Chinese food - steak and peppers.     I have personally reviewed the labs and data; pertinent labs and data are listed in this note; please see below.   =======================================================  Past Medical & Surgical Hx:  =====================  PAST MEDICAL & SURGICAL HISTORY:  Prostate CA  Weak heart  Insomnia  HTN (hypertension)  S/P right inguinal herniorrhaphy  History of back surgery  ICD (implantable cardioverter-defibrillator) in place  Abdominal hernia    Problem List:  ==========  HEALTH ISSUES - PROBLEM Dx:  Long term current use of opiate analgesic: Long term current use of opiate analgesic  Chronic midline low back pain without sciatica: Chronic midline low back pain without sciatica  Acute postoperative abdominal pain: Acute postoperative abdominal pain  Pain managed using patient-controlled analgesia (PCA): Pain managed using patient-controlled analgesia (PCA)  Need for prophylactic measure: Need for prophylactic measure  Chronic diastolic congestive heart failure: Chronic diastolic congestive heart failure  Essential hypertension: Essential hypertension  Acute renal failure, unspecified acute renal failure type: Acute renal failure, unspecified acute renal failure type  Small bowel obstruction: Small bowel obstruction  Pre-operative cardiovascular examination: Pre-operative cardiovascular examination  Chronic heart failure, unspecified heart failure type: Chronic heart failure, unspecified heart failure type  Urinary retention: Urinary retention  Prostate CA: Prostate CA    Social Hx:  =======  no toxic habits currently    FAMILY HISTORY:  no significant family history of immunosuppressive disorders in mother or father   =======================================================  REVIEW OF SYSTEMS:  CONSTITUTIONAL:  No Fever or chills  HEENT:  No diplopia or blurred vision.  No earache, sore throat or runny nose.  CARDIOVASCULAR:  No pressure, squeezing, strangling, tightness, heaviness or aching about the chest, neck, axilla or epigastrium.  RESPIRATORY:  No cough, shortness of breath  GASTROINTESTINAL:  No nausea, vomiting or diarrhea.  GENITOURINARY:  No dysuria, frequency or urgency. No Blood in urine  MUSCULOSKELETAL:  no joint aches, no muscle pain  SKIN:  No change in skin, hair or nails.  NEUROLOGIC:  No Headaches, seizures or weakness.  PSYCHIATRIC:  No disorder of thought or mood.  ENDOCRINE:  No heat or cold intolerance  HEMATOLOGICAL:  No easy bruising or bleeding.   =======================================================  Allergies  cucumber (Anaphylaxis; Hives; Urticaria; Short breath)  No Known Drug Allergies    Antibiotics:  vancomycin    Solution 125 milliGRAM(s) Oral every 6 hours    Other medications:  aspirin  chewable 81 milliGRAM(s) Oral daily  atorvastatin 40 milliGRAM(s) Oral at bedtime  carvedilol 6.25 milliGRAM(s) Oral every 12 hours  chlorhexidine 4% Liquid 1 Application(s) Topical <User Schedule>  enalapril 10 milliGRAM(s) Oral daily  heparin   Injectable 5000 Unit(s) SubCutaneous every 8 hours  metoprolol tartrate Injectable 5 milliGRAM(s) IV Push every 6 hours  nicotine -  14 mG/24Hr(s) Patch 1 patch Transdermal daily  tamsulosin 0.4 milliGRAM(s) Oral at bedtime     vancomycin    Solution   125 milliGRAM(s) Oral (08-04-20 @ 18:12)   125 milliGRAM(s) Oral (08-05-20 @ 00:01)   125 milliGRAM(s) Oral (08-05-20 @ 06:01)   125 milliGRAM(s) Oral (08-05-20 @ 11:18)      ======================================================  Physical Exam:  ============  T(F): 97.7 (05 Aug 2020 08:08), Max: 98.9 (04 Aug 2020 19:57)  HR: 99 (05 Aug 2020 11:15)  BP: 107/73 (05 Aug 2020 11:15)  RR: 18 (05 Aug 2020 08:08)  SpO2: 96% (04 Aug 2020 23:50) (94% - 97%)  temp max in last 48H T(F): , Max: 98.9 (08-04-20 @ 19:57)    General:  No acute distress.  Eye: Pupils are equal, round and reactive to light, Extraocular movements are intact, Normal conjunctiva.  HENT: Normocephalic, Oral mucosa is moist, No pharyngeal erythema, No sinus tenderness.  Neck: Supple, No lymphadenopathy.  Respiratory: Lungs are clear to auscultation, Respirations are non-labored.  Cardiovascular: Normal rate, Regular rhythm,   Gastrointestinal: Soft, Non-tender, Non-distended, Normal bowel sounds.  abd incision with micro-vac dressing in place  Genitourinary: No costovertebral angle tenderness.  + verma with clear urine.   Lymphatics: No lymphadenopathy neck,   Musculoskeletal: Normal range of motion, Normal strength.  Integumentary: No rash.  Neurologic: Alert, Oriented, No focal deficits, Cranial Nerves II-XII are grossly intact.  Psychiatric: Appropriate mood & affect.    =======================================================  Labs:                        10.9   13.45 )-----------( 326      ( 05 Aug 2020 07:47 )             34.9     WBC Count: 13.45 K/uL (08-05-20 @ 07:47)  WBC Count: 14.51 K/uL (08-04-20 @ 05:56)  WBC Count: 11.99 K/uL (08-03-20 @ 06:03)     08-05    138  |  104  |  19.0  ----------------------------<  94  3.7   |  19.0<L>  |  1.17    Ca    7.7<L>      05 Aug 2020 07:47  Phos  2.6     08-05  Mg     2.0     08-05        Culture - Urine (collected 07-27-20 @ 21:56)  Source: .Urine Clean Catch (Midstream)  Final Report (07-28-20 @ 16:37):    No growth      Creatinine, Serum: 1.17 mg/dL (08-05-20 @ 07:47)  Creatinine, Serum: 1.22 mg/dL (08-04-20 @ 05:56)  Creatinine, Serum: 1.05 mg/dL (08-03-20 @ 06:03)  Creatinine, Serum: 0.96 mg/dL (08-02-20 @ 05:02)  Creatinine, Serum: 1.09 mg/dL (08-01-20 @ 05:17)        COVID-19 PCR: NotDetec (07-26-20 @ 23:32) oral

## 2020-08-05 NOTE — DISCHARGE NOTE PROVIDER - CARE PROVIDER_API CALL
Pablo Ramírez)  Surgery  Bariatric  [address], [place] [id number]  Phone: [phone]  Fax: [phone]  Follow Up Time: 1-3 days    Lily Darnell  INTERNAL MEDICINE  [address] [place] [address]  Phone: [phone]  Fax: [phone]  Follow Up Time: 1 week    Libertad Jha  CARDIAC ELECTROPHYSIOLOGY  [address] [place] [address]  Phone: [phone]  Fax: [phone]  Follow Up Time: 1 week    Jaiden Juan  INFECTIOUS DISEASE  [address] [address]  Phone: [phone]  Fax: [phone]  Follow Up Time: 2 weeks

## 2020-08-05 NOTE — PROGRESS NOTE ADULT - ATTENDING COMMENTS
Pt seen and examined with surgery team. Above note reviewed and edited where appropriate.     Pt feels well. Denies nausea or bloating. Gastrografin visualized in colon  Abdomen soft, nontender  Advance to liquid diet as tolerated today
Pain well-controlled, ambulating. Pt reports flatus however still large amount output from NGT  Abdomen soft, nontender  Continue NPO, IVF, NGT to LCWS  Monitor NGT output  IS, Ambulation encouraged  DVT ppx
Patient seen and examined with surgery team. Above note reviewed and edited where appropriate    POD1, pain controlled with PCA  Abdomen soft, nontender  Continue NPO, IVF, NGT to LCWS, replete NGT output  Consultant recs appreciated
Pt seen and examined with surgery team. Above note reviewed and edited where appropriate    Pt reports flatus and BM however abdomen still distended and NGT with ~250mL this morning  Will perform gastrograffin challenge when NGT output decreases   Continue NPO, IVF, NGT to LCWS, replace NGT losses  Medicine, nephrology, cardiology, and urology recs appreciated
Tolerated clear liquid diet. Pt complains of watery BM  Abdomen soft, nontender  WBC uptrending  Will advance diet, order UA, CXR, and c.diff
Continue NPO, IVF, NGT to LCWS  Serial abdominal exams  Ambulation, IS encouraged
Tolerated clear liquid diet. NGT residual after 6 hours of PO intake was 200mL  NGT removed  Abdomen soft, nontender  Continue clear liquid diet
Diarrhea symptoms improved - pt reports formed stool. Tolerating diet. Denies pain  Abdomen soft, nontender  WBC 13  Follow-up ID recs for dc  Plan for discharge home when home PO course confirmed. Will remove incisional VAC dressing prior to discharge

## 2020-08-05 NOTE — CONSULT NOTE ADULT - CONSULT REQUESTED DATE/TIME
05-Aug-2020 11:42
27-Jul-2020 03:00
27-Jul-2020 03:02
27-Jul-2020 13:03
30-Jul-2020 23:44
27-Jul-2020 11:10
27-Jul-2020 12:11

## 2020-08-13 ENCOUNTER — APPOINTMENT (OUTPATIENT)
Dept: SURGERY | Facility: CLINIC | Age: 70
End: 2020-08-13
Payer: MEDICARE

## 2020-08-13 VITALS
BODY MASS INDEX: 25.49 KG/M2 | OXYGEN SATURATION: 99 % | HEIGHT: 75 IN | TEMPERATURE: 97.6 F | WEIGHT: 205 LBS | DIASTOLIC BLOOD PRESSURE: 76 MMHG | SYSTOLIC BLOOD PRESSURE: 117 MMHG | HEART RATE: 93 BPM

## 2020-08-13 PROCEDURE — 99024 POSTOP FOLLOW-UP VISIT: CPT

## 2020-08-13 NOTE — PLAN
[FreeTextEntry1] : Complete PO vancomycin course\par Continue daily wound care - patient and his wife will and able to care for wound\par Return to office in 1 week for wound check

## 2020-08-13 NOTE — ASSESSMENT
[FreeTextEntry1] : Mr. GRANT is a 69 year old man who presented with small bowel obstruction s/p diagnostic laparoscopy converted to exploratory laparotomy, extensive lysis of adhesions, and small bowel resection with primary anastomosis on 7/29/20\par \par C.diff+\par Diarrhea has resolved. Pt has 1 week of PO vancomycin remaining\par \par Incision:\par midline incision with serosanguinous drainage, opened in 2 positions (at umbilicus and at lower pole of incision) and probed, small amount serosanguinous drainage removed, no purulence, no erythema. all staples removed. 2 sites of open incision packed with gauze and wound dressed

## 2020-08-13 NOTE — PHYSICAL EXAM
[JVD] : no jugular venous distention  [No Rash or Lesion] : No rash or lesion [Oriented to Person] : oriented to person [Alert] : alert [Oriented to Place] : oriented to place [Oriented to Time] : oriented to time [Calm] : calm [de-identified] : No acute distress [de-identified] : Regular rate [de-identified] : No respiratory distress [de-identified] : soft, nontender. no rebound or guarding. midline incision with serosanguinous drainage, opened in 2 positions (at umbilicus and at lower pole of incision) and probed, small amount serosanguinous drainage removed, no purulence, no erythema. all staples removed. 2 sites of open incision packed with gauze and wound dressed [de-identified] : normal range of motion

## 2020-08-13 NOTE — HISTORY OF PRESENT ILLNESS
[de-identified] : Mr. GRANT is a 69 year old man who presented with small bowel obstruction s/p diagnostic laparoscopy converted to exploratory laparotomy, extensive lysis of adhesions, and small bowel resection with primary anastomosis on 7/29/20 who comes in today for postop visit. He is doing well. Denies pain. Denies fever/chills. No redness or pain at incision sites. Tolerating diet. Normal bowel movements.\par \par Of note, pt had watery BM and had +c.diff test and was treated with PO vancomycin prior to discharge. Today, pt denies diarrhea and reports that stool is formed. Reports good compliance with PO vancomycin and has 1 week remaining.

## 2020-08-20 ENCOUNTER — APPOINTMENT (OUTPATIENT)
Dept: SURGERY | Facility: CLINIC | Age: 70
End: 2020-08-20
Payer: MEDICARE

## 2020-08-20 ENCOUNTER — APPOINTMENT (OUTPATIENT)
Dept: INTERNAL MEDICINE | Facility: CLINIC | Age: 70
End: 2020-08-20
Payer: MEDICARE

## 2020-08-20 VITALS
SYSTOLIC BLOOD PRESSURE: 100 MMHG | TEMPERATURE: 97.5 F | HEIGHT: 75 IN | DIASTOLIC BLOOD PRESSURE: 65 MMHG | OXYGEN SATURATION: 97 % | HEART RATE: 93 BPM | BODY MASS INDEX: 25.74 KG/M2 | WEIGHT: 207 LBS

## 2020-08-20 VITALS
HEIGHT: 75 IN | BODY MASS INDEX: 25.74 KG/M2 | DIASTOLIC BLOOD PRESSURE: 60 MMHG | SYSTOLIC BLOOD PRESSURE: 90 MMHG | WEIGHT: 207 LBS | TEMPERATURE: 97.7 F

## 2020-08-20 DIAGNOSIS — Z82.49 FAMILY HISTORY OF ISCHEMIC HEART DISEASE AND OTHER DISEASES OF THE CIRCULATORY SYSTEM: ICD-10-CM

## 2020-08-20 DIAGNOSIS — A04.72 ENTEROCOLITIS DUE TO CLOSTRIDIUM DIFFICILE, NOT SPECIFIED AS RECURRENT: ICD-10-CM

## 2020-08-20 DIAGNOSIS — Z83.3 FAMILY HISTORY OF DIABETES MELLITUS: ICD-10-CM

## 2020-08-20 DIAGNOSIS — Z87.891 PERSONAL HISTORY OF NICOTINE DEPENDENCE: ICD-10-CM

## 2020-08-20 DIAGNOSIS — T14.8XXA OTHER INJURY OF UNSPECIFIED BODY REGION, INITIAL ENCOUNTER: ICD-10-CM

## 2020-08-20 PROCEDURE — 99024 POSTOP FOLLOW-UP VISIT: CPT

## 2020-08-20 PROCEDURE — 99214 OFFICE O/P EST MOD 30 MIN: CPT

## 2020-08-20 RX ORDER — HYDROCODONE BITARTRATE AND ACETAMINOPHEN 5; 325 MG/1; MG/1
5-325 TABLET ORAL
Refills: 0 | Status: ACTIVE | COMMUNITY

## 2020-08-20 RX ORDER — ATORVASTATIN CALCIUM 80 MG/1
80 TABLET, FILM COATED ORAL
Refills: 0 | Status: ACTIVE | COMMUNITY

## 2020-08-20 RX ORDER — MULTIVIT-MIN/IRON/FOLIC ACID/K 18-600-40
400 CAPSULE ORAL
Refills: 0 | Status: ACTIVE | COMMUNITY

## 2020-08-20 RX ORDER — TAMSULOSIN HYDROCHLORIDE 0.4 MG/1
0.4 CAPSULE ORAL
Refills: 0 | Status: ACTIVE | COMMUNITY

## 2020-08-20 RX ORDER — ENALAPRIL MALEATE 10 MG/1
10 TABLET ORAL
Refills: 0 | Status: ACTIVE | COMMUNITY

## 2020-08-20 RX ORDER — CARVEDILOL 12.5 MG/1
12.5 TABLET, FILM COATED ORAL
Refills: 0 | Status: ACTIVE | COMMUNITY

## 2020-08-20 RX ORDER — ZOLPIDEM TARTRATE 10 MG/1
10 TABLET ORAL
Refills: 0 | Status: ACTIVE | COMMUNITY

## 2020-08-20 NOTE — HISTORY OF PRESENT ILLNESS
[FreeTextEntry1] : Mr. Edgar is a 69M with a PMHx HF s/p AICD/PPM, prostate ca s/p radical\par prostatectomy, urinary incontinence, HLD, HTN, insomnia, chronic pain syndrome\par from lumbar disc herniations who presents with a 1-week history of abdominal\par bloating, nausea, vomiting, and loose dark stools. Patient initially attributed\par his symptoms to food poising, however abdominal discomfort did not improve with\par conservative measures. His abdominal pain is rated as a 6/10 and is described\par as a "knot-like" feeling in his umbilicus. It is associated with decreased\par appetite. No fevers, chills, CP, or SOB. (27 Jul 2020 00:55)\par \par He presented with SBO s/p Lap converted to open ANT Enterotomy and with small\par bowel resection on 7/29, now w/post-op ileus. NGT discontinued on 8/3.\par post procedure, had loose BM; checked for C diff and was positive.\par \par NO fevers noted. patient reports that his Bowels movements are semi-formed in\par oatmeal consistency.\par \par Urology note appreciated. Dye had been placed under cystoscopy for inability\par to pass urine.\par \par Denies recent antibiotics at home.\par He did mention that his symptoms began after eating some bad Chinese food -\par steak and peppers.\par \par He was seen in the [facility] on 8/5/2020\par \par since leaving hospital, he has finished his course of VANCOMYCIN PO yesterday 8/19.\par \par He reports formed stools. \par No fevers\par \par He had an abd drain in the inferior pole of abd, since removed,  STILL> with some drainage from site. \par

## 2020-08-20 NOTE — PHYSICAL EXAM
[General Appearance - Alert] : alert [General Appearance - In No Acute Distress] : in no acute distress [PERRL With Normal Accommodation] : pupils were equal in size, round, reactive to light [Extraocular Movements] : extraocular movements were intact [Sclera] : the sclera and conjunctiva were normal [Outer Ear] : the ears and nose were normal in appearance [Oropharynx] : the oropharynx was normal with no thrush [Neck Cervical Mass (___cm)] : no neck mass was observed [Neck Appearance] : the appearance of the neck was normal [Thyroid Diffuse Enlargement] : the thyroid was not enlarged [Jugular Venous Distention Increased] : there was no jugular-venous distention [Auscultation Breath Sounds / Voice Sounds] : lungs were clear to auscultation bilaterally [Heart Rate And Rhythm] : heart rate was normal and rhythm regular [Heart Sounds Gallop] : no gallops [Murmurs] : no murmurs [Heart Sounds] : normal S1 and S2 [Heart Sounds Pericardial Friction Rub] : no pericardial rub [Full Pulse] : the pedal pulses are present [Edema] : there was no peripheral edema [Bowel Sounds] : normal bowel sounds [Abdomen Soft] : soft [Abdomen Tenderness] : non-tender [Abdomen Mass (___ Cm)] : no abdominal mass palpated [FreeTextEntry1] : mildly distended.   INFERIOR pole of midline incision, with SEROUS drainage [Costovertebral Angle Tenderness] : no CVA tenderness [No Palpable Adenopathy] : no palpable adenopathy [Nail Clubbing] : no clubbing  or cyanosis of the fingernails [Musculoskeletal - Swelling] : no joint swelling [Motor Tone] : muscle strength and tone were normal [Skin Color & Pigmentation] : normal skin color and pigmentation [Deep Tendon Reflexes (DTR)] : deep tendon reflexes were 2+ and symmetric [Sensation] : the sensory exam was normal to light touch and pinprick [] : no rash [Affect] : the affect was normal [Oriented To Time, Place, And Person] : oriented to person, place, and time [No Focal Deficits] : no focal deficits

## 2020-08-20 NOTE — PHYSICAL EXAM
[General Appearance - Alert] : alert [General Appearance - In No Acute Distress] : in no acute distress [PERRL With Normal Accommodation] : pupils were equal in size, round, reactive to light [Extraocular Movements] : extraocular movements were intact [Sclera] : the sclera and conjunctiva were normal [Oropharynx] : the oropharynx was normal with no thrush [Outer Ear] : the ears and nose were normal in appearance [Neck Cervical Mass (___cm)] : no neck mass was observed [Thyroid Diffuse Enlargement] : the thyroid was not enlarged [Jugular Venous Distention Increased] : there was no jugular-venous distention [Neck Appearance] : the appearance of the neck was normal [Auscultation Breath Sounds / Voice Sounds] : lungs were clear to auscultation bilaterally [Heart Rate And Rhythm] : heart rate was normal and rhythm regular [Heart Sounds Gallop] : no gallops [Heart Sounds] : normal S1 and S2 [Murmurs] : no murmurs [Heart Sounds Pericardial Friction Rub] : no pericardial rub [Edema] : there was no peripheral edema [Full Pulse] : the pedal pulses are present [Bowel Sounds] : normal bowel sounds [Abdomen Soft] : soft [Abdomen Tenderness] : non-tender [Abdomen Mass (___ Cm)] : no abdominal mass palpated [FreeTextEntry1] : mildly distended.   INFERIOR pole of midline incision, with SEROUS drainage [Costovertebral Angle Tenderness] : no CVA tenderness [No Palpable Adenopathy] : no palpable adenopathy [Nail Clubbing] : no clubbing  or cyanosis of the fingernails [Musculoskeletal - Swelling] : no joint swelling [Motor Tone] : muscle strength and tone were normal [Skin Color & Pigmentation] : normal skin color and pigmentation [Sensation] : the sensory exam was normal to light touch and pinprick [Deep Tendon Reflexes (DTR)] : deep tendon reflexes were 2+ and symmetric [] : no rash [Affect] : the affect was normal [No Focal Deficits] : no focal deficits [Oriented To Time, Place, And Person] : oriented to person, place, and time

## 2020-08-20 NOTE — HISTORY OF PRESENT ILLNESS
[FreeTextEntry1] : Mr. Edgar is a 69M with a PMHx HF s/p AICD/PPM, prostate ca s/p radical\par prostatectomy, urinary incontinence, HLD, HTN, insomnia, chronic pain syndrome\par from lumbar disc herniations who presents with a 1-week history of abdominal\par bloating, nausea, vomiting, and loose dark stools. Patient initially attributed\par his symptoms to food poising, however abdominal discomfort did not improve with\par conservative measures. His abdominal pain is rated as a 6/10 and is described\par as a "knot-like" feeling in his umbilicus. It is associated with decreased\par appetite. No fevers, chills, CP, or SOB. (27 Jul 2020 00:55)\par \par He presented with SBO s/p Lap converted to open ANT Enterotomy and with small\par bowel resection on 7/29, now w/post-op ileus. NGT discontinued on 8/3.\par post procedure, had loose BM; checked for C diff and was positive.\par \par NO fevers noted. patient reports that his Bowels movements are semi-formed in\par oatmeal consistency.\par \par Urology note appreciated. Dye had been placed under cystoscopy for inability\par to pass urine.\par \par Denies recent antibiotics at home.\par He did mention that his symptoms began after eating some bad Chinese food -\par steak and peppers.\par \par He was seen in the [facility] on 8/5/2020\par \par since leaving hospital, he has finished his course of VANCOMYCIN PO yesterday 8/19.\par \par He reports formed stools. \par No fevers\par \par He had an abd drain in the inferior pole of abd, since removed,  STILL> with some drainage from site. \par

## 2020-08-20 NOTE — DATA REVIEWED
[FreeTextEntry1] : Labs:\par \par  10.9\par 13.45 )-----------( 326 ( 05 Aug 2020 07:47 )\par  34.9\par \par WBC Count: 13.45 K/uL (08-05-20 @ 07:47)\par WBC Count: 14.51 K/uL (08-04-20 @ 05:56)\par WBC Count: 11.99 K/uL (08-03-20 @ 06:03)\par \par  08-05\par \par 138 | 104 | 19.0\par ----------------------------< 94\par 3.7 | 19.0<L> | 1.17\par \par Ca 7.7<L> 05 Aug 2020 07:47\par Phos 2.6 08-05\par Mg 2.0 08-05\par \par \par \par Culture - Urine (collected 07-27-20 @ 21:56)\par Source: .Urine Clean Catch (Midstream)\par Final Report (07-28-20 @ 16:37):\par  No growth\par \par \par Creatinine, Serum: 1.17 mg/dL (08-05-20 @ 07:47)\par Creatinine, Serum: 1.22 mg/dL (08-04-20 @ 05:56)\par Creatinine, Serum: 1.05 mg/dL (08-03-20 @ 06:03)\par Creatinine, Serum: 0.96 mg/dL (08-02-20 @ 05:02)\par Creatinine, Serum: 1.09 mg/dL (08-01-20 @ 05:17)\par \par \par COVID-19 PCR: NotDetec (07-26-20 @ 23:32)\par

## 2020-08-20 NOTE — ASSESSMENT
[Treatment Education] : treatment education [FreeTextEntry1] : C diff diarrhea appears fully treated\par - defer antibiotics unless absolutely needed\par - advised that probiotics may be of benefit\par - advised patient on fermented foods that may be of benefit\par - advised on hand hygiene\par - continue to check stool quality daily\par - has follow up with PMD as well\par \par Wound incision site with drainage at former drainage tube\par - does NOT appear infected\par - suggest use of Betadine to cleanse the area and then cover with gauze\par - following with surgery today. \par  [Treatment Adherence] : treatment adherence [Risk Reduction] : risk reduction [Nutritional / Food Issues] : nutritional/food issues [Universal Precautions] : universal precautions [Anticipatory Guidance] : anticipatory guidance

## 2020-08-20 NOTE — ASSESSMENT
[FreeTextEntry1] : C diff diarrhea appears fully treated\par - defer antibiotics unless absolutely needed\par - advised that probiotics may be of benefit\par - advised patient on fermented foods that may be of benefit\par - advised on hand hygiene\par - continue to check stool quality daily\par - has follow up with PMD as well\par \par Wound incision site with drainage at former drainage tube\par - does NOT appear infected\par - suggest use of Betadine to cleanse the area and then cover with gauze\par - following with surgery today. \par  [Treatment Education] : treatment education [Treatment Adherence] : treatment adherence [Risk Reduction] : risk reduction [Nutritional / Food Issues] : nutritional/food issues [Universal Precautions] : universal precautions [Anticipatory Guidance] : anticipatory guidance

## 2020-08-21 NOTE — PLAN
[FreeTextEntry1] : Continue daily wound care\par Return to office in 1 week for wound check\par Left inguinal hernia to be addressed after wound fully healed

## 2020-08-21 NOTE — REVIEW OF SYSTEMS
[Negative] : Heme/Lymph [Abdominal Pain] : no abdominal pain [Vomiting] : no vomiting [Diarrhea] : no diarrhea [Constipation] : no constipation [Heartburn] : no heartburn [Melena] : no melena

## 2020-08-21 NOTE — ASSESSMENT
[FreeTextEntry1] : Mr. GRANT is a 69 year old man who presented with small bowel obstruction s/p diagnostic laparoscopy converted to exploratory laparotomy, extensive lysis of adhesions, and small bowel resection with primary anastomosis on 7/29/20. Midline incision remains open in 2 positions (at umbilicus and at lower pole of incision), each packed and wound dressed. \par \par Pt has left inguinal hernia which will be addressed when midline wound completely healed

## 2020-08-21 NOTE — HISTORY OF PRESENT ILLNESS
[de-identified] : Mr. GRANT is a 69 year old man who presented with small bowel obstruction s/p diagnostic laparoscopy converted to exploratory laparotomy, extensive lysis of adhesions, and small bowel resection with primary anastomosis on 7/29/20, seen in office last week, who comes in today for followupvisit. He is doing well. Denies pain. Denies fever/chills. Reports small amount of drainage from open portions of midline incision. Tolerating diet. Normal bowel movements.\par \par Left inguinal hernia will be addressed when midline wound completed healed

## 2020-08-21 NOTE — PHYSICAL EXAM
[JVD] : no jugular venous distention  [No Rash or Lesion] : No rash or lesion [Alert] : alert [Oriented to Person] : oriented to person [Oriented to Place] : oriented to place [Oriented to Time] : oriented to time [Calm] : calm [de-identified] : No acute distress [de-identified] : No respiratory distress [de-identified] : Regular rate [de-identified] : soft, nontender. no rebound or guarding. midline incision with small amount serosanguinous drainage from 2 positions (at umbilicus and at lower pole of incision). each of these positions more shallow than 1 week ago with clean wound bed. no purulence, no erythema. 2 sites of open incision packed with gauze and wound dressed [de-identified] : normal range of motion

## 2020-08-27 ENCOUNTER — APPOINTMENT (OUTPATIENT)
Dept: SURGERY | Facility: CLINIC | Age: 70
End: 2020-08-27
Payer: MEDICARE

## 2020-08-27 VITALS
DIASTOLIC BLOOD PRESSURE: 58 MMHG | TEMPERATURE: 97.6 F | SYSTOLIC BLOOD PRESSURE: 86 MMHG | HEART RATE: 84 BPM | BODY MASS INDEX: 25.86 KG/M2 | OXYGEN SATURATION: 97 % | HEIGHT: 75 IN | WEIGHT: 208 LBS

## 2020-08-27 VITALS — SYSTOLIC BLOOD PRESSURE: 95 MMHG | DIASTOLIC BLOOD PRESSURE: 63 MMHG

## 2020-08-27 PROCEDURE — 99024 POSTOP FOLLOW-UP VISIT: CPT

## 2020-08-31 NOTE — HISTORY OF PRESENT ILLNESS
[de-identified] : Mr. GRANT is a 69 year old man who presented with small bowel obstruction s/p diagnostic laparoscopy converted to exploratory laparotomy, extensive lysis of adhesions, and small bowel resection with primary anastomosis on 7/29/20, seen in office last week, who comes in today for followup visit. He is doing well. Denies pain. Denies fever/chills. Reports drainage from midline incision has stopped. No longer able to pack wound. Tolerating diet. Normal bowel movements.\par \par Left inguinal hernia will be addressed when midline wound completed healed

## 2020-08-31 NOTE — PLAN
[FreeTextEntry1] : Return to office in 4 weeks for operative planning for open left inguinal hernia repair

## 2020-08-31 NOTE — ASSESSMENT
[FreeTextEntry1] : Mr. GRANT is a 69 year old man who presented with small bowel obstruction s/p diagnostic laparoscopy converted to exploratory laparotomy, extensive lysis of adhesions, and small bowel resection with primary anastomosis on 7/29/20. Midline incision remains open in 2 positions (at umbilicus and at lower pole of incision), now too shallow to be packed\par \par Pt has left inguinal hernia which will be addressed when midline wound completely healed

## 2020-08-31 NOTE — PHYSICAL EXAM
[JVD] : no jugular venous distention  [No Rash or Lesion] : No rash or lesion [Alert] : alert [Oriented to Place] : oriented to place [Oriented to Person] : oriented to person [Calm] : calm [Oriented to Time] : oriented to time [de-identified] : No acute distress [de-identified] : Regular rate [de-identified] : No respiratory distress [de-identified] : soft, nontender. no rebound or guarding. midline incision remains open in 2 positions, too shallow to pack. wound bed clean. no drainage. no erythema.  [de-identified] : normal range of motion

## 2020-09-13 NOTE — DIETITIAN INITIAL EVALUATION ADULT. - PERTINENT LABORATORY DATA
Pt upset at d/c due to her not getting any more pain meds ,      Libby Stafford, RN  09/13/20 6179 08-01 Na146 mmol/L<H> Glu 108 mg/dL<H> K+ 3.7 mmol/L Cr  1.09 mg/dL BUN 22.0 mg/dL<H> Phos 1.9 mg/dL<L> Alb 2.8 g/dL<L> PAB n/a

## 2020-09-24 ENCOUNTER — APPOINTMENT (OUTPATIENT)
Dept: SURGERY | Facility: CLINIC | Age: 70
End: 2020-09-24
Payer: MEDICARE

## 2020-09-24 VITALS
TEMPERATURE: 97.3 F | OXYGEN SATURATION: 97 % | SYSTOLIC BLOOD PRESSURE: 104 MMHG | DIASTOLIC BLOOD PRESSURE: 69 MMHG | HEIGHT: 75 IN | HEART RATE: 80 BPM | WEIGHT: 216 LBS | BODY MASS INDEX: 26.86 KG/M2

## 2020-09-24 DIAGNOSIS — K40.90 UNILATERAL INGUINAL HERNIA, W/OUT OBSTRUCTION OR GANGRENE, NOT SPECIFIED AS RECURRENT: ICD-10-CM

## 2020-09-24 PROCEDURE — 99214 OFFICE O/P EST MOD 30 MIN: CPT | Mod: 24

## 2020-09-24 NOTE — REVIEW OF SYSTEMS
[As Noted in HPI] : as noted in HPI [Incontinence] : incontinence [Negative] : Heme/Lymph [FreeTextEntry8] : since prostate surgery 9 years ago

## 2020-09-24 NOTE — PHYSICAL EXAM
[JVD] : no jugular venous distention  [No Rash or Lesion] : No rash or lesion [Alert] : alert [Oriented to Person] : oriented to person [Oriented to Place] : oriented to place [Oriented to Time] : oriented to time [Calm] : calm [de-identified] : No acute distress [de-identified] : No respiratory distress [de-identified] : Regular rate [de-identified] : soft, nontender. no rebound or guarding. midline incision well-healed. left inguinal hernia palpable, partially reducible, nontender, no overlying skni changes [de-identified] : normal range of motion

## 2020-09-24 NOTE — ASSESSMENT
[FreeTextEntry1] : Mr. GRANT is a 69 year old man with symptomatic left inguinal hernia. Of note, the patient presented to [facility] with small bowel obstruction s/p diagnostic laparoscopy converted to exploratory laparotomy, extensive lysis of adhesions, and small bowel resection with primary anastomosis on 7/29/20 - he has recovered from that and his incisions are now fully healed. \par \par Regarding the left inguinal hernia, we discussed the options of open repair, minimally invasive repair, and nonoperative management. We discussed that minimally invasive repair would be challenging due to surgical history. The risks/benefits and alternatives were discussed at length and all questions were answered. The patient appears to understand and wishes to proceed with open left inguinal hernia repair with mesh.\par

## 2020-09-24 NOTE — HISTORY OF PRESENT ILLNESS
[de-identified] : Mr. GRANT is a 69 year old man with left inguinal hernia who comes in today to discuss hernia repair. Of note, the patient presented to [facility] with small bowel obstruction s/p diagnostic laparoscopy converted to exploratory laparotomy, extensive lysis of adhesions, and small bowel resection with primary anastomosis on 7/29/20 - he has recovered from that and his incisions are now fully healed. \par \par The patient reports occasional discomfort at left inguinal bulge, especially at end of day and with activity. Today, denies pain. Denies fever/chills. Tolerating diet. Normal bowel movements.

## 2020-10-14 ENCOUNTER — OUTPATIENT (OUTPATIENT)
Dept: OUTPATIENT SERVICES | Facility: HOSPITAL | Age: 70
LOS: 1 days | End: 2020-10-14
Payer: MEDICARE

## 2020-10-14 VITALS
TEMPERATURE: 98 F | HEART RATE: 76 BPM | RESPIRATION RATE: 18 BRPM | DIASTOLIC BLOOD PRESSURE: 56 MMHG | WEIGHT: 216.93 LBS | OXYGEN SATURATION: 95 % | HEIGHT: 75 IN | SYSTOLIC BLOOD PRESSURE: 105 MMHG

## 2020-10-14 DIAGNOSIS — Z98.890 OTHER SPECIFIED POSTPROCEDURAL STATES: Chronic | ICD-10-CM

## 2020-10-14 DIAGNOSIS — Z29.9 ENCOUNTER FOR PROPHYLACTIC MEASURES, UNSPECIFIED: ICD-10-CM

## 2020-10-14 DIAGNOSIS — Z13.89 ENCOUNTER FOR SCREENING FOR OTHER DISORDER: ICD-10-CM

## 2020-10-14 DIAGNOSIS — E78.5 HYPERLIPIDEMIA, UNSPECIFIED: ICD-10-CM

## 2020-10-14 DIAGNOSIS — Z95.810 PRESENCE OF AUTOMATIC (IMPLANTABLE) CARDIAC DEFIBRILLATOR: Chronic | ICD-10-CM

## 2020-10-14 DIAGNOSIS — K40.90 UNILATERAL INGUINAL HERNIA, WITHOUT OBSTRUCTION OR GANGRENE, NOT SPECIFIED AS RECURRENT: ICD-10-CM

## 2020-10-14 DIAGNOSIS — I10 ESSENTIAL (PRIMARY) HYPERTENSION: ICD-10-CM

## 2020-10-14 DIAGNOSIS — K46.9 UNSPECIFIED ABDOMINAL HERNIA WITHOUT OBSTRUCTION OR GANGRENE: Chronic | ICD-10-CM

## 2020-10-14 DIAGNOSIS — Z01.818 ENCOUNTER FOR OTHER PREPROCEDURAL EXAMINATION: ICD-10-CM

## 2020-10-14 DIAGNOSIS — K56.609 UNSPECIFIED INTESTINAL OBSTRUCTION, UNSPECIFIED AS TO PARTIAL VERSUS COMPLETE OBSTRUCTION: Chronic | ICD-10-CM

## 2020-10-14 LAB
A1C WITH ESTIMATED AVERAGE GLUCOSE RESULT: 5.7 % — HIGH (ref 4–5.6)
ANION GAP SERPL CALC-SCNC: 10 MMOL/L — SIGNIFICANT CHANGE UP (ref 5–17)
APTT BLD: 34.9 SEC — SIGNIFICANT CHANGE UP (ref 27.5–35.5)
BUN SERPL-MCNC: 14 MG/DL — SIGNIFICANT CHANGE UP (ref 8–20)
CALCIUM SERPL-MCNC: 9.1 MG/DL — SIGNIFICANT CHANGE UP (ref 8.6–10.2)
CHLORIDE SERPL-SCNC: 105 MMOL/L — SIGNIFICANT CHANGE UP (ref 98–107)
CO2 SERPL-SCNC: 24 MMOL/L — SIGNIFICANT CHANGE UP (ref 22–29)
CREAT SERPL-MCNC: 1.16 MG/DL — SIGNIFICANT CHANGE UP (ref 0.5–1.3)
ESTIMATED AVERAGE GLUCOSE: 117 MG/DL — HIGH (ref 68–114)
GLUCOSE SERPL-MCNC: 98 MG/DL — SIGNIFICANT CHANGE UP (ref 70–99)
HCT VFR BLD CALC: 41 % — SIGNIFICANT CHANGE UP (ref 39–50)
HGB BLD-MCNC: 12.4 G/DL — LOW (ref 13–17)
INR BLD: 1.12 RATIO — SIGNIFICANT CHANGE UP (ref 0.88–1.16)
MCHC RBC-ENTMCNC: 26.1 PG — LOW (ref 27–34)
MCHC RBC-ENTMCNC: 30.2 GM/DL — LOW (ref 32–36)
MCV RBC AUTO: 86.3 FL — SIGNIFICANT CHANGE UP (ref 80–100)
PLATELET # BLD AUTO: 307 K/UL — SIGNIFICANT CHANGE UP (ref 150–400)
POTASSIUM SERPL-MCNC: 4.6 MMOL/L — SIGNIFICANT CHANGE UP (ref 3.5–5.3)
POTASSIUM SERPL-SCNC: 4.6 MMOL/L — SIGNIFICANT CHANGE UP (ref 3.5–5.3)
PROTHROM AB SERPL-ACNC: 12.9 SEC — SIGNIFICANT CHANGE UP (ref 10.6–13.6)
RBC # BLD: 4.75 M/UL — SIGNIFICANT CHANGE UP (ref 4.2–5.8)
RBC # FLD: 14.6 % — HIGH (ref 10.3–14.5)
SODIUM SERPL-SCNC: 139 MMOL/L — SIGNIFICANT CHANGE UP (ref 135–145)
WBC # BLD: 7.02 K/UL — SIGNIFICANT CHANGE UP (ref 3.8–10.5)
WBC # FLD AUTO: 7.02 K/UL — SIGNIFICANT CHANGE UP (ref 3.8–10.5)

## 2020-10-14 PROCEDURE — 87640 STAPH A DNA AMP PROBE: CPT

## 2020-10-14 PROCEDURE — 85610 PROTHROMBIN TIME: CPT

## 2020-10-14 PROCEDURE — 87641 MR-STAPH DNA AMP PROBE: CPT

## 2020-10-14 PROCEDURE — 93005 ELECTROCARDIOGRAM TRACING: CPT

## 2020-10-14 PROCEDURE — 85027 COMPLETE CBC AUTOMATED: CPT

## 2020-10-14 PROCEDURE — 85730 THROMBOPLASTIN TIME PARTIAL: CPT

## 2020-10-14 PROCEDURE — 80048 BASIC METABOLIC PNL TOTAL CA: CPT

## 2020-10-14 PROCEDURE — 83036 HEMOGLOBIN GLYCOSYLATED A1C: CPT

## 2020-10-14 PROCEDURE — 93010 ELECTROCARDIOGRAM REPORT: CPT

## 2020-10-14 PROCEDURE — 36415 COLL VENOUS BLD VENIPUNCTURE: CPT

## 2020-10-14 RX ORDER — ATORVASTATIN CALCIUM 80 MG/1
1 TABLET, FILM COATED ORAL
Qty: 0 | Refills: 0 | DISCHARGE

## 2020-10-14 RX ORDER — HYDROCODONE BITARTRATE 50 MG/1
1 CAPSULE, EXTENDED RELEASE ORAL
Qty: 0 | Refills: 0 | DISCHARGE

## 2020-10-14 NOTE — H&P PST ADULT - HISTORY OF PRESENT ILLNESS
intermittent pain  to left inguinal area april 2020 but worsened over time last 3 months  5-6/10 unable to describe pain.   chronic back pain   steps and lifiting certain leg movements make pain worse   sitting resting 70 year old male PMH of HTN, HLD, BPH, chronic back pain recent surgery in July for small bowel obstruction and lysis of adhesions present to PST c/o left inguinal pain. Patient states he has been experiencing intermittent pain to left groin on and off since April of 2020. States over the last 3 months the pain went from a 2/10 on pain scale to a 6/10. Patient states he is not able to describe the pain just that "it makes him aware" and it has become more bothersome. Reports pain worse when going up stairs, lifting items and with ceratin leg movements. Pain is relieved with rest and sitting. Patient states he take daily pain medications for his chronic back pain which also helps with his groin pain. Patient denies, chest pain, SOB, LUO, palpitations, nausea, vomiting, abdominal pain. Patient has AICD [employer] model# [id number] Serial# [id number], reports interrogated 10/14/2020. Scheduled for open left inguinal hernia repair with mesh on 10/21/20 with Dr. Ramírez pending medical and cardiology clearance.

## 2020-10-14 NOTE — H&P PST ADULT - EKG AND INTERPRETATION
Normal Sinus Rhythm HR 69 inferior infarct age undetermined.  Patient is pending cardiology clearance.

## 2020-10-14 NOTE — H&P PST ADULT - NSICDXPASTMEDICALHX_GEN_ALL_CORE_FT
PAST MEDICAL HISTORY:  BPH (benign prostatic hyperplasia)     HTN (hypertension)     Insomnia     Prostate CA     Weak heart      PAST MEDICAL HISTORY:  BPH (benign prostatic hyperplasia)     Chronic back pain     H/O Clostridium difficile infection July 2020    HTN (hypertension)     Hyperlipidemia     Insomnia     Post traumatic stress disorder (PTSD)     Prostate CA     Weak heart

## 2020-10-14 NOTE — H&P PST ADULT - NS MD HP INPLANTS MED DEV
Automatic Implantable Cardioverter Defibrillator [employer] Model# [id number], Serial# [id number]/Automatic Implantable Cardioverter Defibrillator

## 2020-10-14 NOTE — H&P PST ADULT - OTHER CARE PROVIDERS
Dr. Martino [facility] 10/14 [phone], Dr. Jha [phone] Dr. Martino PCP 10/14 [phone] appointment on 10/15 for medical clearance , Dr. Jha [phone] cardiology seen today 10/14

## 2020-10-14 NOTE — H&P PST ADULT - NSANTHOSAYNRD_GEN_A_CORE
No. MARIBELL screening performed.  STOP BANG Legend: 0-2 = LOW Risk; 3-4 = INTERMEDIATE Risk; 5-8 = HIGH Risk

## 2020-10-14 NOTE — H&P PST ADULT - NSICDXPASTSURGICALHX_GEN_ALL_CORE_FT
PAST SURGICAL HISTORY:  Abdominal hernia     History of back surgery     ICD (implantable cardioverter-defibrillator) in place     S/P right inguinal herniorrhaphy      PAST SURGICAL HISTORY:  Abdominal hernia     History of back surgery     History of prostate surgery     ICD (implantable cardioverter-defibrillator) in place 6/26/2013    S/P right inguinal herniorrhaphy     SBO (small bowel obstruction)

## 2020-10-14 NOTE — H&P PST ADULT - NSICDXPROBLEM_GEN_ALL_CORE_FT
PROBLEM DIAGNOSES  Problem: HTN (hypertension)  Assessment and Plan: Continue medication as prescribed.  Take blood pressure medications with sip of water morning of procedure.  Pending medical clearance with PCP and cardiology clearance.     Problem: Hyperlipidemia  Assessment and Plan: Continue medication as prescribed.  Pending medical clearance from PCP    Problem: Need for prophylactic measure  Assessment and Plan: CAP score 5 patient at risk please evaluate need for prophylactic measures    Problem: Screening for substance abuse  Assessment and Plan: ORT score 0 patient at low risk     Problem: Unilateral inguinal hernia without obstruction or gangrene  Assessment and Plan: Patient scheduled for open left inguinal hernia repair with mesh oedning medical clearance from PCP Dr. Kruger and cardiology clearance from DR. Jha

## 2020-10-14 NOTE — H&P PST ADULT - NSICDXFAMILYHX_GEN_ALL_CORE_FT
FAMILY HISTORY:  FH: diabetes mellitus, mother  FHx: myocardial infarction, father, 6 brother had MI

## 2020-10-14 NOTE — H&P PST ADULT - LYMPHATIC
anterior cervical L/supraclavicular R/posterior cervical R/supraclavicular L/anterior cervical R/posterior cervical L

## 2020-10-14 NOTE — H&P PST ADULT - ATTENDING COMMENTS
No change to patient condition  Plan for open left inguinal hernia repair with mesh  Risks/benefits discussed. Pt understands, agrees, and wishes to proceed. All questions answered.

## 2020-10-14 NOTE — H&P PST ADULT - ASSESSMENT
This is a pleasant 70 year old male PMH of HTN, HLD, BPH, chronic back pain recent surgery in July for small bowel obstruction and lysis of adhesions present to PST c/o left inguinal pain. Patient states he has been experiencing intermittent pain to left groin on and off since 2020. States over the last 3 months the pain went from a 2/10 on pain scale to a 6/10. Patient states he is not able to describe the pain just that "it makes him aware" and it has become more bothersome. Reports pain worse when going up stairs, lifting items and with certain leg movements. Pain is relieved with rest and sitting. Patient states he take daily pain medications for his chronic back pain which also helps with his groin pain. Patient denies, chest pain, SOB, LUO, palpitations, nausea, vomiting, abdominal pain. Scheduled for open left inguinal hernia repair with mesh on 10/21/20 with Dr. Ramírez pending medical clearance from PCP Dr. Kruger and cardiology clearance with Dr. Jha.   Patient has AICD [employer] model# [id number] Serial# [id number], reports interrogated 10/14/2020, spoke with Rep Davis Forbes made aware of patient's upcoming surgery.   CAPRINI SCORE [CLOT]    AGE RELATED RISK FACTORS                                                       MOBILITY RELATED FACTORS  [ ] Age 41-60 years                                            (1 Point)                  [ ] Bed rest                                                        (1 Point)  [X ] Age: 61-74 years                                           (2 Points)                 [ ] Plaster cast                                                   (2 Points)  [ ] Age= 75 years                                              (3 Points)                 [ ] Bed bound for more than 72 hours                 (2 Points)    DISEASE RELATED RISK FACTORS                                               GENDER SPECIFIC FACTORS  [ ] Edema in the lower extremities                       (1 Point)                  [ ] Pregnancy                                                     (1 Point)  [ ] Varicose veins                                               (1 Point)                  [ ] Post-partum < 6 weeks                                   (1 Point)             [X ] BMI > 25 Kg/m2                                            (1 Point)                  [ ] Hormonal therapy  or oral contraception          (1 Point)                 [ ] Sepsis (in the previous month)                        (1 Point)                  [ ] History of pregnancy complications                 (1 point)  [ ] Pneumonia or serious lung disease                                               [ ] Unexplained or recurrent                     (1 Point)           (in the previous month)                               (1 Point)  [ ] Abnormal pulmonary function test                     (1 Point)                 SURGERY RELATED RISK FACTORS  [ ] Acute myocardial infarction                              (1 Point)                 [ ]  Section                                             (1 Point)  [ ] Congestive heart failure (in the previous month)  (1 Point)               [ ] Minor surgery                                                  (1 Point)   [ ] Inflammatory bowel disease                             (1 Point)                 [ ] Arthroscopic surgery                                        (2 Points)  [ ] Central venous access                                      (2 Points)                [X ] General surgery lasting more than 45 minutes   (2 Points)       [ ] Stroke (in the previous month)                          (5 Points)               [ ] Elective arthroplasty                                         (5 Points)                                                                                                                                               HEMATOLOGY RELATED FACTORS                                                 TRAUMA RELATED RISK FACTORS  [ ] Prior episodes of VTE                                     (3 Points)                [ ] Fracture of the hip, pelvis, or leg                       (5 Points)  [ ] Positive family history for VTE                         (3 Points)                 [ ] Acute spinal cord injury (in the previous month)  (5 Points)  [ ] Prothrombin 77140 A                                     (3 Points)                 [ ] Paralysis  (less than 1 month)                             (5 Points)  [ ] Factor V Leiden                                             (3 Points)                  [ ] Multiple Trauma within 1 month                        (5 Points)  [ ] Lupus anticoagulants                                     (3 Points)                                                           [ ] Anticardiolipin antibodies                               (3 Points)                                                       [ ] High homocysteine in the blood                      (3 Points)                                             [ ] Other congenital or acquired thrombophilia      (3 Points)                                                [ ] Heparin induced thrombocytopenia                  (3 Points)                                        Total Score [     5     ]    Caprini Score 0 - 2:  Low Risk, No VTE Prophylaxis required for most patients, encourage ambulation  Caprini Score 3 - 6:  At Risk, pharmacologic VTE prophylaxis is indicated for most patients (in the absence of a contraindication)  Caprini Score Greater than or = 7:  High Risk, pharmacologic VTE prophylaxis is indicated for most patients (in the absence of a contraindication)    OPIOID RISK TOOL    MASON EACH BOX THAT APPLIES AND ADD TOTALS AT THE END    FAMILY HISTORY OF SUBSTANCE ABUSE                 FEMALE         MALE                                                Alcohol                             [  ]1 pt          [  ]3pts                                               Illegal Durgs                     [  ]2 pts        [  ]3pts                                               Rx Drugs                           [  ]4 pts        [  ]4 pts    PERSONAL HISTORY OF SUBSTANCE ABUSE                                                                                          Alcohol                             [  ]3 pts       [  ]3 pts                                               Illegal Drugs                     [  ]4 pts        [  ]4 pts                                               Rx Drugs                           [  ]5 pts        [  ]5 pts    AGE BETWEEN 16-45 YEARS                                      [  ]1 pt         [  ]1 pt    HISTORY OF PREADOLESCENT   SEXUAL ABUSE                                                             [  ]3 pts        [  ]0pts    PSYCHOLOGICAL DISEASE                     ADD, OCD, Bipolar, Schizophrenia        [  ]2 pts         [  ]2 pts                      Depression                                               [  ]1 pt           [  ]1 pt           SCORING TOTAL   (add numbers and type here)              (*0**)                                     A score of 3 or lower indicated LOW risk for future opioid abuse  A score of 4 to 7 indicated moderate risk for future opioid abuse  A score of 8 or higher indicates a high risk for opioid abuse

## 2020-10-15 DIAGNOSIS — Z01.818 ENCOUNTER FOR OTHER PREPROCEDURAL EXAMINATION: ICD-10-CM

## 2020-10-15 LAB
MRSA PCR RESULT.: SIGNIFICANT CHANGE UP
S AUREUS DNA NOSE QL NAA+PROBE: SIGNIFICANT CHANGE UP

## 2020-10-18 ENCOUNTER — APPOINTMENT (OUTPATIENT)
Dept: DISASTER EMERGENCY | Facility: CLINIC | Age: 70
End: 2020-10-18

## 2020-10-19 ENCOUNTER — TRANSCRIPTION ENCOUNTER (OUTPATIENT)
Age: 70
End: 2020-10-19

## 2020-10-19 LAB — SARS-COV-2 N GENE NPH QL NAA+PROBE: NOT DETECTED

## 2020-10-20 ENCOUNTER — TRANSCRIPTION ENCOUNTER (OUTPATIENT)
Age: 70
End: 2020-10-20

## 2020-10-21 ENCOUNTER — OUTPATIENT (OUTPATIENT)
Dept: OUTPATIENT SERVICES | Facility: HOSPITAL | Age: 70
LOS: 1 days | End: 2020-10-21
Payer: MEDICARE

## 2020-10-21 ENCOUNTER — APPOINTMENT (OUTPATIENT)
Dept: SURGERY | Facility: HOSPITAL | Age: 70
End: 2020-10-21

## 2020-10-21 VITALS
WEIGHT: 216.93 LBS | SYSTOLIC BLOOD PRESSURE: 104 MMHG | HEIGHT: 75 IN | HEART RATE: 77 BPM | OXYGEN SATURATION: 99 % | RESPIRATION RATE: 17 BRPM | TEMPERATURE: 97 F | DIASTOLIC BLOOD PRESSURE: 61 MMHG

## 2020-10-21 VITALS
TEMPERATURE: 97 F | SYSTOLIC BLOOD PRESSURE: 107 MMHG | RESPIRATION RATE: 17 BRPM | DIASTOLIC BLOOD PRESSURE: 71 MMHG | HEART RATE: 61 BPM | OXYGEN SATURATION: 97 %

## 2020-10-21 DIAGNOSIS — Z95.810 PRESENCE OF AUTOMATIC (IMPLANTABLE) CARDIAC DEFIBRILLATOR: Chronic | ICD-10-CM

## 2020-10-21 DIAGNOSIS — Z98.890 OTHER SPECIFIED POSTPROCEDURAL STATES: Chronic | ICD-10-CM

## 2020-10-21 DIAGNOSIS — K56.609 UNSPECIFIED INTESTINAL OBSTRUCTION, UNSPECIFIED AS TO PARTIAL VERSUS COMPLETE OBSTRUCTION: Chronic | ICD-10-CM

## 2020-10-21 DIAGNOSIS — K40.90 UNILATERAL INGUINAL HERNIA, WITHOUT OBSTRUCTION OR GANGRENE, NOT SPECIFIED AS RECURRENT: ICD-10-CM

## 2020-10-21 DIAGNOSIS — K46.9 UNSPECIFIED ABDOMINAL HERNIA WITHOUT OBSTRUCTION OR GANGRENE: Chronic | ICD-10-CM

## 2020-10-21 LAB
BLD GP AB SCN SERPL QL: SIGNIFICANT CHANGE UP
GLUCOSE BLDC GLUCOMTR-MCNC: 105 MG/DL — HIGH (ref 70–99)

## 2020-10-21 PROCEDURE — 49507 PRP I/HERN INIT BLOCK >5 YR: CPT | Mod: LT

## 2020-10-21 PROCEDURE — C1781: CPT

## 2020-10-21 PROCEDURE — 86850 RBC ANTIBODY SCREEN: CPT

## 2020-10-21 PROCEDURE — 86900 BLOOD TYPING SEROLOGIC ABO: CPT

## 2020-10-21 PROCEDURE — 49507 PRP I/HERN INIT BLOCK >5 YR: CPT | Mod: 80,79

## 2020-10-21 PROCEDURE — 82962 GLUCOSE BLOOD TEST: CPT

## 2020-10-21 PROCEDURE — 49507 PRP I/HERN INIT BLOCK >5 YR: CPT | Mod: 79

## 2020-10-21 PROCEDURE — 86901 BLOOD TYPING SEROLOGIC RH(D): CPT

## 2020-10-21 RX ORDER — FENTANYL CITRATE 50 UG/ML
25 INJECTION INTRAVENOUS
Refills: 0 | Status: DISCONTINUED | OUTPATIENT
Start: 2020-10-21 | End: 2020-10-21

## 2020-10-21 RX ORDER — SODIUM CHLORIDE 9 MG/ML
1000 INJECTION, SOLUTION INTRAVENOUS
Refills: 0 | Status: DISCONTINUED | OUTPATIENT
Start: 2020-10-21 | End: 2020-10-21

## 2020-10-21 RX ORDER — GABAPENTIN 400 MG/1
300 CAPSULE ORAL ONCE
Refills: 0 | Status: COMPLETED | OUTPATIENT
Start: 2020-10-21 | End: 2020-10-21

## 2020-10-21 RX ORDER — BUPIVACAINE 13.3 MG/ML
20 INJECTION, SUSPENSION, LIPOSOMAL INFILTRATION ONCE
Refills: 0 | Status: DISCONTINUED | OUTPATIENT
Start: 2020-10-21 | End: 2020-10-21

## 2020-10-21 RX ORDER — SODIUM CHLORIDE 9 MG/ML
3 INJECTION INTRAMUSCULAR; INTRAVENOUS; SUBCUTANEOUS ONCE
Refills: 0 | Status: DISCONTINUED | OUTPATIENT
Start: 2020-10-21 | End: 2020-10-21

## 2020-10-21 RX ORDER — HYDROMORPHONE HYDROCHLORIDE 2 MG/ML
1 INJECTION INTRAMUSCULAR; INTRAVENOUS; SUBCUTANEOUS
Refills: 0 | Status: DISCONTINUED | OUTPATIENT
Start: 2020-10-21 | End: 2020-10-21

## 2020-10-21 RX ORDER — TRAMADOL HYDROCHLORIDE 50 MG/1
1 TABLET ORAL
Qty: 12 | Refills: 0
Start: 2020-10-21 | End: 2020-10-23

## 2020-10-21 RX ORDER — DEXAMETHASONE 0.5 MG/5ML
8 ELIXIR ORAL ONCE
Refills: 0 | Status: DISCONTINUED | OUTPATIENT
Start: 2020-10-21 | End: 2020-10-21

## 2020-10-21 RX ORDER — ACETAMINOPHEN 500 MG
975 TABLET ORAL ONCE
Refills: 0 | Status: COMPLETED | OUTPATIENT
Start: 2020-10-21 | End: 2020-10-21

## 2020-10-21 RX ORDER — CELECOXIB 200 MG/1
400 CAPSULE ORAL ONCE
Refills: 0 | Status: COMPLETED | OUTPATIENT
Start: 2020-10-21 | End: 2020-10-21

## 2020-10-21 RX ORDER — ONDANSETRON 8 MG/1
4 TABLET, FILM COATED ORAL ONCE
Refills: 0 | Status: DISCONTINUED | OUTPATIENT
Start: 2020-10-21 | End: 2020-10-21

## 2020-10-21 RX ORDER — ACETAMINOPHEN 500 MG
2 TABLET ORAL
Qty: 24 | Refills: 0
Start: 2020-10-21 | End: 2020-10-23

## 2020-10-21 RX ORDER — CEFAZOLIN SODIUM 1 G
2000 VIAL (EA) INJECTION ONCE
Refills: 0 | Status: DISCONTINUED | OUTPATIENT
Start: 2020-10-21 | End: 2020-10-21

## 2020-10-21 RX ADMIN — Medication 975 MILLIGRAM(S): at 08:39

## 2020-10-21 RX ADMIN — CELECOXIB 400 MILLIGRAM(S): 200 CAPSULE ORAL at 08:39

## 2020-10-21 RX ADMIN — GABAPENTIN 300 MILLIGRAM(S): 400 CAPSULE ORAL at 08:39

## 2020-10-21 NOTE — ASU DISCHARGE PLAN (ADULT/PEDIATRIC) - CARE PROVIDER_API CALL
Pablo Ramírez)  Surgery  Bariatric  [address] [place] [id number]  Phone: [phone]  Fax: [phone]  Established Patient  Follow Up Time: 2 weeks

## 2020-10-21 NOTE — ASU DISCHARGE PLAN (ADULT/PEDIATRIC) - CALL YOUR DOCTOR IF YOU HAVE ANY OF THE FOLLOWING:
Bleeding that does not stop/Pain not relieved by Medications/Wound/Surgical Site with redness, or foul smelling discharge or pus/Fever greater than (need to indicate Fahrenheit or Celsius)/Nausea and vomiting that does not stop/Unable to urinate/Swelling that gets worse

## 2020-10-21 NOTE — BRIEF OPERATIVE NOTE - NSICDXBRIEFPROCEDURE_GEN_ALL_CORE_FT
PROCEDURES:  Initial repair of inguinal hernia in patient age 5 years or older 21-Oct-2020 11:51:54 open L inguinal hernia repair with mesh Gatito Nicole

## 2020-10-21 NOTE — BRIEF OPERATIVE NOTE - OPERATION/FINDINGS
- indirect left inguinal hernia with herniated pre-peritoneal fat  - progrip mesh placed and secured to pubic tubercle with 0-prolene suture

## 2020-10-22 PROBLEM — M54.9 DORSALGIA, UNSPECIFIED: Chronic | Status: ACTIVE | Noted: 2020-10-14

## 2020-10-22 PROBLEM — E78.5 HYPERLIPIDEMIA, UNSPECIFIED: Chronic | Status: ACTIVE | Noted: 2020-10-14

## 2020-10-22 PROBLEM — F43.10 POST-TRAUMATIC STRESS DISORDER, UNSPECIFIED: Chronic | Status: ACTIVE | Noted: 2020-10-14

## 2020-10-22 PROBLEM — N40.0 BENIGN PROSTATIC HYPERPLASIA WITHOUT LOWER URINARY TRACT SYMPTOMS: Chronic | Status: ACTIVE | Noted: 2020-10-14

## 2020-10-22 PROBLEM — Z86.19 PERSONAL HISTORY OF OTHER INFECTIOUS AND PARASITIC DISEASES: Chronic | Status: ACTIVE | Noted: 2020-10-14

## 2020-10-29 ENCOUNTER — APPOINTMENT (OUTPATIENT)
Dept: SURGERY | Facility: CLINIC | Age: 70
End: 2020-10-29
Payer: MEDICARE

## 2020-10-29 VITALS
TEMPERATURE: 97.5 F | HEIGHT: 75 IN | SYSTOLIC BLOOD PRESSURE: 119 MMHG | RESPIRATION RATE: 18 BRPM | DIASTOLIC BLOOD PRESSURE: 76 MMHG | BODY MASS INDEX: 26.73 KG/M2 | WEIGHT: 215 LBS | OXYGEN SATURATION: 98 % | HEART RATE: 90 BPM

## 2020-10-29 PROCEDURE — 99024 POSTOP FOLLOW-UP VISIT: CPT

## 2020-10-29 NOTE — HISTORY OF PRESENT ILLNESS
[de-identified] : Mr. JAYE GRANT is a 70 year-old man who presented with left inguinal hernia s/p left inguinal hernia repair on 10/21/2020 who comes in today for a post op visit. He is doing well. Denies pain. Denies fever or chills. No redness or pain at incision sites. Tolerating diet. Normal bowel movements.\par

## 2020-10-29 NOTE — PHYSICAL EXAM
[JVD] : no jugular venous distention  [No Rash or Lesion] : No rash or lesion [Alert] : alert [Oriented to Person] : oriented to person [Oriented to Place] : oriented to place [Oriented to Time] : oriented to time [Calm] : calm [de-identified] : No acute distress [de-identified] : No respiratory distress [de-identified] : Regular rate [de-identified] : soft, nontender. no rebound or guarding. midline incision well-healed. left inguinal incision c/d/i [de-identified] : normal range of motion

## 2020-10-29 NOTE — ASSESSMENT
[FreeTextEntry1] : Mr. JAYE GRANT is a 70 year-old man who presented with left inguinal hernia s/p left inguinal hernia repair on 10/21/2020, doing well

## 2020-10-30 NOTE — DIETITIAN INITIAL EVALUATION ADULT. - DOB: +DATEOFBIRTH
PREOPERATIVE DIAGNOSIS:  RIGHT superior oblique palsy.       POSTOPERATIVE DIAGNOSIS: RIGHT superior oblique palsy.       PROCEDURE:  RIGHT inferior oblique recession 12 mm.       SURGEON:  Iain Guo MD       ANESTHESIA:  General.       ESTIMATED BLOOD LOSS:  None.       COMPLICATIONS  None.       INDICATIONS FOR PROCEDURE:  The patient has a history of longstanding fourth nerve palsy that has not gotten better and in fact, has been gotten worse and more bothersome. We talked about the risks, benefits and alternative inferior oblique recession including the possibility of undercorrection, overcorrection, infection, perforation, hemorrhage and the possible need for additional surgery, especially in light of the dependent on prism.  The patient understood and is willing to proceed.       DESCRIPTION OF PROCEDURE:  After obtaining informed consent, the patient was brought into the operating room, placed under anesthesia by the anesthesia service.  Attention was then directed to the RIGHT  eye.  It was prepped and draped in the usual sterile manner for ophthalmic procedures.  4-0 silk was grabbed inferior oblique and it was adducted and elevated to allow for exposure to the inferior oblique and infratemporal quadrant. A fornix based incision was made and then the Dejuan and then a gas hook were passed under the lateral rectus muscle.  4-0 silk was used passed through the eye of the gas hook and act as a bridle suture and the lateral rectus muscle.  Once this was done, the globe was fixed in an adducted and slightly elevated position.  The inferior oblique was then identified through the fornix based incision.  It was then captured on a [place] hook and freed of attachments to Tenon's and conjunctiva, taking care not to violate vortex vein.  Once this was done, the muscle was then disinserted from the globe with the help of a short straight hemostat and secured with 6-0 Vicryl in the format typical for  inferior oblique muscles.  Once this was done, the muscle was then reattached to the globe at a spot 12 mm from the original insertion using partial thickness scleral bites.  Muscle was then tied down and secured and the Vicryl suture trimmed.  The conjunctival peritomy was brought focal anatomic fashion with 6-0 plain gut. The patient was then awoken from anesthesia having tolerated the procedure well and having suffered no complications.        Statement Selected

## 2020-12-24 ENCOUNTER — APPOINTMENT (OUTPATIENT)
Dept: SURGERY | Facility: CLINIC | Age: 70
End: 2020-12-24
Payer: MEDICARE

## 2020-12-24 VITALS
DIASTOLIC BLOOD PRESSURE: 67 MMHG | WEIGHT: 225 LBS | SYSTOLIC BLOOD PRESSURE: 100 MMHG | RESPIRATION RATE: 18 BRPM | OXYGEN SATURATION: 96 % | HEIGHT: 75 IN | HEART RATE: 81 BPM | BODY MASS INDEX: 27.98 KG/M2 | TEMPERATURE: 97.6 F

## 2020-12-24 DIAGNOSIS — Z83.3 FAMILY HISTORY OF DIABETES MELLITUS: ICD-10-CM

## 2020-12-24 DIAGNOSIS — Z82.49 FAMILY HISTORY OF ISCHEMIC HEART DISEASE AND OTHER DISEASES OF THE CIRCULATORY SYSTEM: ICD-10-CM

## 2020-12-24 DIAGNOSIS — K43.2 INCISIONAL HERNIA W/OUT OBSTRUCTION OR GANGRENE: ICD-10-CM

## 2020-12-24 PROCEDURE — 99024 POSTOP FOLLOW-UP VISIT: CPT

## 2020-12-24 NOTE — PLAN
[FreeTextEntry1] : CT scan of the abdomen/pelvis to assess hernia anatomy / defect size\par Return to office after CT scan complete

## 2020-12-24 NOTE — ASSESSMENT
[FreeTextEntry1] : Mr. GRANT is a 69 year old man s/p diagnostic laparoscopy converted to exploratory laparotomy, extensive lysis of adhesions, and small bowel resection with primary anastomosis on 7/29/20 who returns today with symptomatic incisional hernia at site of midline exploratory laparotomy incision

## 2020-12-24 NOTE — HISTORY OF PRESENT ILLNESS
[de-identified] : Mr. GRANT is a 69 year old man s/p diagnostic laparoscopy converted to exploratory laparotomy, extensive lysis of adhesions, and small bowel resection with primary anastomosis on 7/29/20 and s/p open left inguinal hernia repair on 10/21/20 who returns today with complaints of bulging at his midline exploratory laparotomy incision. Bulging occurs with activity and when straining. He reports mild discomfort at site of bulging, especially with activity. Denies pain or bulging at site of left inguinal hernia repair. Denies fever/chills. Tolerating diet. Normal bowel movements.

## 2020-12-24 NOTE — PHYSICAL EXAM
[JVD] : no jugular venous distention  [No Rash or Lesion] : No rash or lesion [Alert] : alert [Oriented to Person] : oriented to person [Oriented to Place] : oriented to place [Oriented to Time] : oriented to time [Calm] : calm [de-identified] : No acute distress [de-identified] : No respiratory distress [de-identified] : Regular rate [de-identified] : soft, nontender. no rebound or guarding. large supraumbilical incisional hernia, bulge appreciated with valsalva - difficult to appreciate edges of hernia defect. no evidence of recurrence at left inguinal hernia. skin incisions well-healed [de-identified] : normal range of motion

## 2021-01-12 ENCOUNTER — TRANSCRIPTION ENCOUNTER (OUTPATIENT)
Age: 71
End: 2021-01-12

## 2021-01-14 ENCOUNTER — APPOINTMENT (OUTPATIENT)
Dept: SURGERY | Facility: CLINIC | Age: 71
End: 2021-01-14
Payer: MEDICARE

## 2021-01-14 VITALS
HEART RATE: 88 BPM | SYSTOLIC BLOOD PRESSURE: 109 MMHG | WEIGHT: 225 LBS | HEIGHT: 75 IN | BODY MASS INDEX: 27.98 KG/M2 | DIASTOLIC BLOOD PRESSURE: 67 MMHG | TEMPERATURE: 97.3 F | OXYGEN SATURATION: 94 %

## 2021-01-14 PROCEDURE — 99024 POSTOP FOLLOW-UP VISIT: CPT

## 2021-01-14 NOTE — PLAN
[FreeTextEntry1] : Pt to lose weight with goal weight of 210-215 pounds\par Plan for incisional hernia repair with mesh and component separation following weight loss\par Return to office in 6 weeks

## 2021-01-14 NOTE — PHYSICAL EXAM
[JVD] : no jugular venous distention  [No Rash or Lesion] : No rash or lesion [Alert] : alert [Oriented to Person] : oriented to person [Oriented to Place] : oriented to place [Oriented to Time] : oriented to time [Calm] : calm [de-identified] : No acute distress [de-identified] : No respiratory distress [de-identified] : Regular rate [de-identified] : soft, nontender. no rebound or guarding. large supraumbilical incisional hernia, bulge appreciated with valsalva. no evidence of recurrence at left inguinal hernia. skin incisions well-healed [de-identified] : normal range of motion

## 2021-01-14 NOTE — ASSESSMENT
[FreeTextEntry1] : Mr. GRANT is a 70 year old man with incisional hernia hernia, defect measuring 12cm in width. We discussed the options of robotic/laparoscopic repair, open repair, and nonoperative management. We discussed the need for component separation and use of mesh. We also discussed the potential benefit of weight loss to reduce intraabdominal pressure and reduce risk of hernia recurrence. The risks/benefits and alternatives were discussed at length and all questions were answered. The patient appears to understand and wishes to attempt weight loss prior to hernia repair, with the goal weight of 210-215 pounds. \par

## 2021-01-14 NOTE — HISTORY OF PRESENT ILLNESS
[de-identified] : Mr. GRANT is a 69 year old man s/p diagnostic laparoscopy converted to exploratory laparotomy, extensive lysis of adhesions, and small bowel resection with primary anastomosis on 7/29/20 and s/p open left inguinal hernia repair on 10/21/20, last seen in office on 12/24/2020 with incisional hernia who returns today after obtaining CT scan, which confirms presence of large, bowel-containing ventral incisional hernia. He reports mild discomfort at site of bulging, especially with activity. Denies pain or bulging at site of left inguinal hernia repair. Denies fever/chills. Tolerating diet. Normal bowel movements.\par \par CT reviewed - hernia defect 12cm between medial borders of each rectus muscle\par \par Of note, pt has gained weight since initial surgery (205 -> 225 pounds)

## 2021-02-19 NOTE — ED PROVIDER NOTE - NS_EDPROVIDERDISPOUSERTYPE_ED_A_ED
Relevant Problems   ENDOCRINE   (+) Obesity       Anesthetic History   No history of anesthetic complications            Review of Systems / Medical History  Patient summary reviewed, nursing notes reviewed and pertinent labs reviewed    Pulmonary  Within defined limits                 Neuro/Psych   Within defined limits           Cardiovascular  Within defined limits                     GI/Hepatic/Renal  Within defined limits              Endo/Other        Obesity     Other Findings   Comments: pevious pregnancy had Pre E         Physical Exam    Airway  Mallampati: II  TM Distance: 4 - 6 cm  Neck ROM: normal range of motion   Mouth opening: Normal     Cardiovascular    Rhythm: regular  Rate: normal         Dental         Pulmonary  Breath sounds clear to auscultation               Abdominal  Abdominal exam normal       Other Findings            Anesthetic Plan    ASA: 2  Anesthesia type: spinal and general - backup            Anesthetic plan and risks discussed with: Patient Attending Attestation (For Attendings USE Only)...

## 2021-02-25 ENCOUNTER — APPOINTMENT (OUTPATIENT)
Dept: SURGERY | Facility: CLINIC | Age: 71
End: 2021-02-25
Payer: MEDICARE

## 2021-02-25 VITALS
HEART RATE: 86 BPM | SYSTOLIC BLOOD PRESSURE: 100 MMHG | OXYGEN SATURATION: 95 % | HEIGHT: 75 IN | BODY MASS INDEX: 25.99 KG/M2 | WEIGHT: 209 LBS | TEMPERATURE: 97.1 F | DIASTOLIC BLOOD PRESSURE: 61 MMHG

## 2021-02-25 PROCEDURE — 99214 OFFICE O/P EST MOD 30 MIN: CPT

## 2021-02-25 PROCEDURE — 99072 ADDL SUPL MATRL&STAF TM PHE: CPT

## 2021-02-26 NOTE — ASSESSMENT
[FreeTextEntry1] : Mr. GRANT is a 70 year old man with incisional hernia hernia, defect measuring 12cm in width. We discussed the options of robotic/laparoscopic repair, open repair, and nonoperative management. We discussed the need for component separation and use of mesh. We also discussed the option of preoperative botox which would be injected at each of the 3 lateral muscle compartments on each side of his abdomen - doing so may decrease the amount of dissection required intraoperatively and may allow us to not perform a component separation. We again discussed the importance of the patient's weigh loss, which will reduce the risk of hernia recurrence. The risks/benefits and alternatives were discussed at length and all questions were answered. The patient appears to understand and wishes to proceed with botox injection followed by incisional hernia repair with mesh and possible component separation.

## 2021-02-26 NOTE — HISTORY OF PRESENT ILLNESS
[de-identified] : Mr. EDGAR is a 69 year old man s/p diagnostic laparoscopy converted to exploratory laparotomy, extensive lysis of adhesions, and small bowel resection with primary anastomosis on 7/29/20 and s/p open left inguinal hernia repair on 10/21/20, last seen in office on 1/14/2021 with incisional hernia who returns today for follow-up. Pt has lost 16 pounds since last visit. Today, Mr. Edgar reports mild discomfort at site of bulging, especially with activity. Denies pain or bulging at site of left inguinal hernia repair. Denies fever/chills. Tolerating diet. Normal bowel movements.\par \par CT reviewed - hernia defect 12cm between medial borders of each rectus muscle

## 2021-02-26 NOTE — PHYSICAL EXAM
[JVD] : no jugular venous distention  [No Rash or Lesion] : No rash or lesion [Alert] : alert [Oriented to Person] : oriented to person [Oriented to Place] : oriented to place [Oriented to Time] : oriented to time [Calm] : calm [de-identified] : No acute distress [de-identified] : No respiratory distress [de-identified] : Regular rate [de-identified] : soft, nontender. no rebound or guarding. large supraumbilical incisional hernia, bulge appreciated with valsalva. no evidence of recurrence at left inguinal hernia. skin incisions well-healed [de-identified] : normal range of motion

## 2021-02-26 NOTE — PLAN
[FreeTextEntry1] : Plan for preoperative botox injection with interventional radiology followed by incisional hernia repair with mesh and possible component separation\par Case discussed with Dr. Hobson (Interventional Radiology)

## 2021-03-05 NOTE — H&P ADULT - ASSESSMENT
Maternity OPCM note  - Case status updated. Report received from ALLAN Flynn, Nurse Navigator. 66yo M w/ PMHx HF (suspect systolic HF based on pt's description but no records available) s/p AICD/PPM, "silent heart attack," prostate ca s/p radical prostectomy, urinary incontinence, HLD, HTN, insomnia, chronic pain syndrome from lumbar disc herniations presents with complaint of repeated episodes of suspected AICD shocking.

## 2021-03-08 ENCOUNTER — OUTPATIENT (OUTPATIENT)
Dept: OUTPATIENT SERVICES | Facility: HOSPITAL | Age: 71
LOS: 1 days | End: 2021-03-08
Payer: MEDICARE

## 2021-03-08 VITALS
DIASTOLIC BLOOD PRESSURE: 60 MMHG | SYSTOLIC BLOOD PRESSURE: 108 MMHG | WEIGHT: 208.34 LBS | HEIGHT: 75 IN | HEART RATE: 78 BPM | RESPIRATION RATE: 20 BRPM | TEMPERATURE: 97 F

## 2021-03-08 DIAGNOSIS — Z95.810 PRESENCE OF AUTOMATIC (IMPLANTABLE) CARDIAC DEFIBRILLATOR: Chronic | ICD-10-CM

## 2021-03-08 DIAGNOSIS — Z29.9 ENCOUNTER FOR PROPHYLACTIC MEASURES, UNSPECIFIED: ICD-10-CM

## 2021-03-08 DIAGNOSIS — K46.9 UNSPECIFIED ABDOMINAL HERNIA WITHOUT OBSTRUCTION OR GANGRENE: Chronic | ICD-10-CM

## 2021-03-08 DIAGNOSIS — K43.2 INCISIONAL HERNIA WITHOUT OBSTRUCTION OR GANGRENE: ICD-10-CM

## 2021-03-08 DIAGNOSIS — Z95.810 PRESENCE OF AUTOMATIC (IMPLANTABLE) CARDIAC DEFIBRILLATOR: ICD-10-CM

## 2021-03-08 DIAGNOSIS — Z91.89 OTHER SPECIFIED PERSONAL RISK FACTORS, NOT ELSEWHERE CLASSIFIED: ICD-10-CM

## 2021-03-08 DIAGNOSIS — I10 ESSENTIAL (PRIMARY) HYPERTENSION: ICD-10-CM

## 2021-03-08 DIAGNOSIS — Z98.890 OTHER SPECIFIED POSTPROCEDURAL STATES: Chronic | ICD-10-CM

## 2021-03-08 DIAGNOSIS — K56.609 UNSPECIFIED INTESTINAL OBSTRUCTION, UNSPECIFIED AS TO PARTIAL VERSUS COMPLETE OBSTRUCTION: Chronic | ICD-10-CM

## 2021-03-08 DIAGNOSIS — Z01.818 ENCOUNTER FOR OTHER PREPROCEDURAL EXAMINATION: ICD-10-CM

## 2021-03-08 LAB
ALBUMIN SERPL ELPH-MCNC: 4.3 G/DL — SIGNIFICANT CHANGE UP (ref 3.3–5.2)
ALP SERPL-CCNC: 136 U/L — HIGH (ref 40–120)
ALT FLD-CCNC: 13 U/L — SIGNIFICANT CHANGE UP
ANION GAP SERPL CALC-SCNC: 11 MMOL/L — SIGNIFICANT CHANGE UP (ref 5–17)
APTT BLD: 36.4 SEC — HIGH (ref 27.5–35.5)
AST SERPL-CCNC: 14 U/L — SIGNIFICANT CHANGE UP
BASOPHILS # BLD AUTO: 0.03 K/UL — SIGNIFICANT CHANGE UP (ref 0–0.2)
BASOPHILS NFR BLD AUTO: 0.4 % — SIGNIFICANT CHANGE UP (ref 0–2)
BILIRUB SERPL-MCNC: 0.3 MG/DL — LOW (ref 0.4–2)
BUN SERPL-MCNC: 21 MG/DL — HIGH (ref 8–20)
CALCIUM SERPL-MCNC: 9.2 MG/DL — SIGNIFICANT CHANGE UP (ref 8.6–10.2)
CHLORIDE SERPL-SCNC: 103 MMOL/L — SIGNIFICANT CHANGE UP (ref 98–107)
CO2 SERPL-SCNC: 23 MMOL/L — SIGNIFICANT CHANGE UP (ref 22–29)
CREAT SERPL-MCNC: 1.14 MG/DL — SIGNIFICANT CHANGE UP (ref 0.5–1.3)
EOSINOPHIL # BLD AUTO: 0.17 K/UL — SIGNIFICANT CHANGE UP (ref 0–0.5)
EOSINOPHIL NFR BLD AUTO: 2 % — SIGNIFICANT CHANGE UP (ref 0–6)
GLUCOSE SERPL-MCNC: 99 MG/DL — SIGNIFICANT CHANGE UP (ref 70–99)
HCT VFR BLD CALC: 43.3 % — SIGNIFICANT CHANGE UP (ref 39–50)
HGB BLD-MCNC: 13.4 G/DL — SIGNIFICANT CHANGE UP (ref 13–17)
IMM GRANULOCYTES NFR BLD AUTO: 0.2 % — SIGNIFICANT CHANGE UP (ref 0–1.5)
INR BLD: 1.15 RATIO — SIGNIFICANT CHANGE UP (ref 0.88–1.16)
LYMPHOCYTES # BLD AUTO: 1.74 K/UL — SIGNIFICANT CHANGE UP (ref 1–3.3)
LYMPHOCYTES # BLD AUTO: 20.8 % — SIGNIFICANT CHANGE UP (ref 13–44)
MCHC RBC-ENTMCNC: 25.9 PG — LOW (ref 27–34)
MCHC RBC-ENTMCNC: 30.9 GM/DL — LOW (ref 32–36)
MCV RBC AUTO: 83.6 FL — SIGNIFICANT CHANGE UP (ref 80–100)
MONOCYTES # BLD AUTO: 0.44 K/UL — SIGNIFICANT CHANGE UP (ref 0–0.9)
MONOCYTES NFR BLD AUTO: 5.3 % — SIGNIFICANT CHANGE UP (ref 2–14)
NEUTROPHILS # BLD AUTO: 5.96 K/UL — SIGNIFICANT CHANGE UP (ref 1.8–7.4)
NEUTROPHILS NFR BLD AUTO: 71.3 % — SIGNIFICANT CHANGE UP (ref 43–77)
PLATELET # BLD AUTO: 249 K/UL — SIGNIFICANT CHANGE UP (ref 150–400)
POTASSIUM SERPL-MCNC: 4.8 MMOL/L — SIGNIFICANT CHANGE UP (ref 3.5–5.3)
POTASSIUM SERPL-SCNC: 4.8 MMOL/L — SIGNIFICANT CHANGE UP (ref 3.5–5.3)
PROT SERPL-MCNC: 7.7 G/DL — SIGNIFICANT CHANGE UP (ref 6.6–8.7)
PROTHROM AB SERPL-ACNC: 13.2 SEC — SIGNIFICANT CHANGE UP (ref 10.6–13.6)
RBC # BLD: 5.18 M/UL — SIGNIFICANT CHANGE UP (ref 4.2–5.8)
RBC # FLD: 15.8 % — HIGH (ref 10.3–14.5)
SODIUM SERPL-SCNC: 137 MMOL/L — SIGNIFICANT CHANGE UP (ref 135–145)
WBC # BLD: 8.36 K/UL — SIGNIFICANT CHANGE UP (ref 3.8–10.5)
WBC # FLD AUTO: 8.36 K/UL — SIGNIFICANT CHANGE UP (ref 3.8–10.5)

## 2021-03-08 PROCEDURE — 93010 ELECTROCARDIOGRAM REPORT: CPT

## 2021-03-08 PROCEDURE — 71046 X-RAY EXAM CHEST 2 VIEWS: CPT | Mod: 26

## 2021-03-08 RX ORDER — SODIUM CHLORIDE 9 MG/ML
3 INJECTION INTRAMUSCULAR; INTRAVENOUS; SUBCUTANEOUS ONCE
Refills: 0 | Status: DISCONTINUED | OUTPATIENT
Start: 2021-03-15 | End: 2021-03-29

## 2021-03-08 RX ORDER — ATORVASTATIN CALCIUM 80 MG/1
1 TABLET, FILM COATED ORAL
Qty: 0 | Refills: 0 | DISCHARGE

## 2021-03-08 NOTE — ASU PATIENT PROFILE, ADULT - FALL HARM RISK CONCLUSION
Problem: Patient Care Overview  Goal: Plan of Care Review  Outcome: Ongoing (interventions implemented as appropriate)   07/12/18 5949   Coping/Psychosocial   Plan of Care Reviewed With patient   Plan of Care Review   Progress improving   OTHER   Outcome Summary Pt. improving even more today. No c/o pain throughout today. Up with assist x 1/GB/Walker. Dsgs CDI ; pt. glucose level remains elevated; sliding scale administered. Will continue to monitor. Rachna Hoff RN           Universal Safety Interventions

## 2021-03-08 NOTE — H&P PST ADULT - NSICDXPASTSURGICALHX_GEN_ALL_CORE_FT
PAST SURGICAL HISTORY:  Abdominal hernia     History of back surgery     History of prostate surgery     ICD (implantable cardioverter-defibrillator) in place 6/26/2013    S/P right inguinal herniorrhaphy     SBO (small bowel obstruction)

## 2021-03-08 NOTE — H&P PST ADULT - NSICDXPROBLEM_GEN_ALL_CORE_FT
PROBLEM DIAGNOSES  Problem: Incisional hernia without obstruction or gangrene  Assessment and Plan: Patient is scheduled for US guided abdominal botox with anesthesia on 3/15/21 in Interventional Radiology (Dr Ramírez ordering),  pending medical and cardiology clearance.     Problem: AICD (automatic cardioverter/defibrillator) present  Assessment and Plan: Patient has AICD defibrillator placed 2013, lat interrogated 10/20, rep notified for DOS, cardiac clearance pending     Problem: At risk for sleep apnea  Assessment and Plan: Stop Bang Score 3, Intermediate Risk, MARIBELL precautions     Problem: Hypertension  Assessment and Plan: /60 today at [facility], continue all medication as prescribed, take enalapril and mecarvedilol AM of surgery, medical and cardiac clearance pending     Problem: Need for prophylactic measure  Assessment and Plan: Caprini Score 4, at risk, surgical team to order appropriate VTE prophylaxis

## 2021-03-08 NOTE — H&P PST ADULT - HISTORY OF PRESENT ILLNESS
70 year old male PMH of HTN, HLD, BPH, chronic back pain recent surgery in July for small bowel obstruction and lysis of adhesions present to PST c/o left inguinal pain. Patient states he has been experiencing intermittent pain to left groin on and off since April of 2020. States over the last 3 months the pain went from a 2/10 on pain scale to a 6/10. Patient states he is not able to describe the pain just that "it makes him aware" and it has become more bothersome. Reports pain worse when going up stairs, lifting items and with ceratin leg movements. Pain is relieved with rest and sitting. Patient states he take daily pain medications for his chronic back pain which also helps with his groin pain. Patient denies, chest pain, SOB, LUO, palpitations, nausea, vomiting, abdominal pain. Patient has AICD [employer] model# [id number] Serial# [id number], reports interrogated 10/14/2020. Scheduled for open left inguinal hernia repair with mesh on 10/21/20 with Dr. Ramírez pending medical and cardiology clearance.     3 hernias  70 year old male PMH of HTN, HLD, BPH, AICD placed 2013, chronic back pain recent surgery in July for small bowel obstruction and lysis of adhesions followed by open left inguinal hernia repair with mesh in October 2021. Following surgery patient developed incisional hernia. He presents to PST today for pre operative evaluation for abdominal botox in anticipation of future surgery to repair incisional hernia. Patient reports mild discomfort at incisional hernia repair, worse with lifting. Patient reports he intentionally lost 10 pounds, he denies fever, chills, nausea, vomiting diarhea or constipation.  Patient is scheduled for US guided abdominal botox with anesthesia on 3/15/21 in Interventional Radiology (Dr Ramírez ordering),  pending medical and cardiology clearance.

## 2021-03-08 NOTE — H&P PST ADULT - NSICDXPASTMEDICALHX_GEN_ALL_CORE_FT
PAST MEDICAL HISTORY:  BPH (benign prostatic hyperplasia)     Chronic back pain     H/O Clostridium difficile infection July 2020    HTN (hypertension)     Hyperlipidemia     Insomnia     Post traumatic stress disorder (PTSD)     Prostate CA     Weak heart      PAST MEDICAL HISTORY:  BPH (benign prostatic hyperplasia)     Chronic back pain     H/O Clostridium difficile infection July 2020    HTN (hypertension)     Hyperlipidemia     Incisional hernia     Insomnia     Post traumatic stress disorder (PTSD)     Prostate CA     Weak heart

## 2021-03-08 NOTE — H&P PST ADULT - ASSESSMENT
CAPRINI SCORE [CLOT]    AGE RELATED RISK FACTORS                                                       MOBILITY RELATED FACTORS  [ ] Age 41-60 years                                            (1 Point)                  [ ] Bed rest                                                        (1 Point)  [x ] Age: 61-74 years                                           (2 Points)                 [ ] Plaster cast                                                   (2 Points)  [ ] Age= 75 years                                              (3 Points)                 [ ] Bed bound for more than 72 hours                 (2 Points)    DISEASE RELATED RISK FACTORS                                               GENDER SPECIFIC FACTORS  [ ] Edema in the lower extremities                       (1 Point)                  [ ] Pregnancy                                                     (1 Point)  [ ] Varicose veins                                               (1 Point)                  [ ] Post-partum < 6 weeks                                   (1 Point)             [x ] BMI > 25 Kg/m2                                            (1 Point)                  [ ] Hormonal therapy  or oral contraception          (1 Point)                 [ ] Sepsis (in the previous month)                        (1 Point)                  [ ] History of pregnancy complications                 (1 point)  [ ] Pneumonia or serious lung disease                                               [ ] Unexplained or recurrent                     (1 Point)           (in the previous month)                               (1 Point)  [ ] Abnormal pulmonary function test                     (1 Point)                 SURGERY RELATED RISK FACTORS  [ ] Acute myocardial infarction                              (1 Point)                 [ ]  Section                                             (1 Point)  [ ] Congestive heart failure (in the previous month)  (1 Point)               [x ] Minor surgery                                                  (1 Point)   [ ] Inflammatory bowel disease                             (1 Point)                 [ ] Arthroscopic surgery                                        (2 Points)  [ ] Central venous access                                      (2 Points)                [ ] General surgery lasting more than 45 minutes   (2 Points)       [ ] Stroke (in the previous month)                          (5 Points)               [ ] Elective arthroplasty                                         (5 Points)                                                                                                                                               HEMATOLOGY RELATED FACTORS                                                 TRAUMA RELATED RISK FACTORS  [ ] Prior episodes of VTE                                     (3 Points)                [ ] Fracture of the hip, pelvis, or leg                       (5 Points)  [ ] Positive family history for VTE                         (3 Points)                 [ ] Acute spinal cord injury (in the previous month)  (5 Points)  [ ] Prothrombin 72675 A                                     (3 Points)                 [ ] Paralysis  (less than 1 month)                             (5 Points)  [ ] Factor V Leiden                                             (3 Points)                  [ ] Multiple Trauma within 1 month                        (5 Points)  [ ] Lupus anticoagulants                                     (3 Points)                                                           [ ] Anticardiolipin antibodies                               (3 Points)                                                       [ ] High homocysteine in the blood                      (3 Points)                                             [ ] Other congenital or acquired thrombophilia      (3 Points)                                                [ ] Heparin induced thrombocytopenia                  (3 Points)                                          Total Score [          ]    Caprini Score 0 - 2:  Low Risk, No VTE Prophylaxis required for most patients, encourage ambulation  Caprini Score 3 - 6:  At Risk, pharmacologic VTE prophylaxis is indicated for most patients (in the absence of a contraindication)  Caprini Score Greater than or = 7:  High Risk, pharmacologic VTE prophylaxis is indicated for most patients (in the absence of a contraindication)    OPIOID RISK TOOL    MASON EACH BOX THAT APPLIES AND ADD TOTALS AT THE END    FAMILY HISTORY OF SUBSTANCE ABUSE                 FEMALE         MALE                                                Alcohol                             [  ]1 pt          [  ]3pts                                               Illegal Durgs                     [  ]2 pts        [  ]3pts                                               Rx Drugs                           [  ]4 pts        [  ]4 pts    PERSONAL HISTORY OF SUBSTANCE ABUSE                                                                                          Alcohol                             [  ]3 pts       [  ]3 pts                                               Illegal Durgs                     [  ]4 pts        [  ]4 pts                                               Rx Drugs                           [  ]5 pts        [  ]5 pts    AGE BETWEEN 16-45 YEARS                                      [  ]1 pt         [  ]1 pt    HISTORY OF PREADOLESCENT   SEXUAL ABUSE                                                             [  ]3 pts        [  ]0pts    PSYCHOLOGICAL DISEASE                     ADD, OCD, Bipolar, Schizophrenia        [  ]2 pts         [  ]2 pts                      Depression                                               [  ]1 pt           [  ]1 pt           SCORING TOTAL   (add numbers and type here)              (***)                                     A score of 3 or lower indicated LOW risk for future opiod abuse  A score of 4 to 7 indicated moderate risk for future opiod abuse  A score of 8 or higher indicates a high risk for opiod abuse CAPRINI SCORE [CLOT]    AGE RELATED RISK FACTORS                                                       MOBILITY RELATED FACTORS  [ ] Age 41-60 years                                            (1 Point)                  [ ] Bed rest                                                        (1 Point)  [x ] Age: 61-74 years                                           (2 Points)                 [ ] Plaster cast                                                   (2 Points)  [ ] Age= 75 years                                              (3 Points)                 [ ] Bed bound for more than 72 hours                 (2 Points)    DISEASE RELATED RISK FACTORS                                               GENDER SPECIFIC FACTORS  [ ] Edema in the lower extremities                       (1 Point)                  [ ] Pregnancy                                                     (1 Point)  [ ] Varicose veins                                               (1 Point)                  [ ] Post-partum < 6 weeks                                   (1 Point)             [x ] BMI > 25 Kg/m2                                            (1 Point)                  [ ] Hormonal therapy  or oral contraception          (1 Point)                 [ ] Sepsis (in the previous month)                        (1 Point)                  [ ] History of pregnancy complications                 (1 point)  [ ] Pneumonia or serious lung disease                                               [ ] Unexplained or recurrent                     (1 Point)           (in the previous month)                               (1 Point)  [ ] Abnormal pulmonary function test                     (1 Point)                 SURGERY RELATED RISK FACTORS  [ ] Acute myocardial infarction                              (1 Point)                 [ ]  Section                                             (1 Point)  [ ] Congestive heart failure (in the previous month)  (1 Point)               [x ] Minor surgery                                                  (1 Point)   [ ] Inflammatory bowel disease                             (1 Point)                 [ ] Arthroscopic surgery                                        (2 Points)  [ ] Central venous access                                      (2 Points)                [ ] General surgery lasting more than 45 minutes   (2 Points)       [ ] Stroke (in the previous month)                          (5 Points)               [ ] Elective arthroplasty                                         (5 Points)                                                                                                                                               HEMATOLOGY RELATED FACTORS                                                 TRAUMA RELATED RISK FACTORS  [ ] Prior episodes of VTE                                     (3 Points)                [ ] Fracture of the hip, pelvis, or leg                       (5 Points)  [ ] Positive family history for VTE                         (3 Points)                 [ ] Acute spinal cord injury (in the previous month)  (5 Points)  [ ] Prothrombin 46949 A                                     (3 Points)                 [ ] Paralysis  (less than 1 month)                             (5 Points)  [ ] Factor V Leiden                                             (3 Points)                  [ ] Multiple Trauma within 1 month                        (5 Points)  [ ] Lupus anticoagulants                                     (3 Points)                                                           [ ] Anticardiolipin antibodies                               (3 Points)                                                       [ ] High homocysteine in the blood                      (3 Points)                                             [ ] Other congenital or acquired thrombophilia      (3 Points)                                                [ ] Heparin induced thrombocytopenia                  (3 Points)                                          Total Score [     4     ]    Caprini Score 0 - 2:  Low Risk, No VTE Prophylaxis required for most patients, encourage ambulation  Caprini Score 3 - 6:  At Risk, pharmacologic VTE prophylaxis is indicated for most patients (in the absence of a contraindication)  Caprini Score Greater than or = 7:  High Risk, pharmacologic VTE prophylaxis is indicated for most patients (in the absence of a contraindication)    OPIOID RISK TOOL    MASON EACH BOX THAT APPLIES AND ADD TOTALS AT THE END    FAMILY HISTORY OF SUBSTANCE ABUSE                 FEMALE         MALE                                                Alcohol                             [  ]1 pt          [  ]3pts                                               Illegal Durgs                     [  ]2 pts        [  ]3pts                                               Rx Drugs                           [  ]4 pts        [  ]4 pts    PERSONAL HISTORY OF SUBSTANCE ABUSE                                                                                          Alcohol                             [  ]3 pts       [  ]3 pts                                               Illegal Durgs                     [  ]4 pts        [  ]4 pts                                               Rx Drugs                           [  ]5 pts        [  ]5 pts    AGE BETWEEN 16-45 YEARS                                      [  ]1 pt         [  ]1 pt    HISTORY OF PREADOLESCENT   SEXUAL ABUSE                                                             [  ]3 pts        [  ]0pts    PSYCHOLOGICAL DISEASE                     ADD, OCD, Bipolar, Schizophrenia        [  ]2 pts         [  ]2 pts                      Depression                                               [  ]1 pt           [  ]1 pt           SCORING TOTAL   0                             A score of 3 or lower indicated LOW risk for future opiod abuse  A score of 4 to 7 indicated moderate risk for future opiod abuse  A score of 8 or higher indicates a high risk for opiod abuse    70 year old male PMH of HTN, HLD, BPH, AICD chronic back pain recent surgery in July for small bowel obstruction and lysis of adhesions followed by open left inguinal hernia repair with mesh in 2021. Following surgery patient developed incisional hernia. He presents to PST today for pre operative evaluation for abdominal botox in anticipation of future surgery to repair incisional hernia. Patient reports mild discomfort at incisional hernia repair, worse with lifting. Patient reports he intentionally lost 10 pounds, he denies fever, chills, nausea, vomiting diarhea or constipation.  Patient is scheduled for US guided abdominal botox with anesthesia on 3/15/21 in Interventional Radiology (Dr Ramírez ordering),  pending medical and cardiology clearance. Patient educated on surgical scrub, COVID testing, preadmission instructions, medical & cardiac clearance and day of procedure medications, verbalizes understanding. Pt instructed to stop vitamins/supplements/herbal medications/ASA/NSAIDS for one week prior to surgery and discuss with PMD.    CAPRINI SCORE [CLOT]    AGE RELATED RISK FACTORS                                                       MOBILITY RELATED FACTORS  [ ] Age 41-60 years                                            (1 Point)                  [ ] Bed rest                                                        (1 Point)  [x ] Age: 61-74 years                                           (2 Points)                 [ ] Plaster cast                                                   (2 Points)  [ ] Age= 75 years                                              (3 Points)                 [ ] Bed bound for more than 72 hours                 (2 Points)    DISEASE RELATED RISK FACTORS                                               GENDER SPECIFIC FACTORS  [ ] Edema in the lower extremities                       (1 Point)                  [ ] Pregnancy                                                     (1 Point)  [ ] Varicose veins                                               (1 Point)                  [ ] Post-partum < 6 weeks                                   (1 Point)             [x ] BMI > 25 Kg/m2                                            (1 Point)                  [ ] Hormonal therapy  or oral contraception          (1 Point)                 [ ] Sepsis (in the previous month)                        (1 Point)                  [ ] History of pregnancy complications                 (1 point)  [ ] Pneumonia or serious lung disease                                               [ ] Unexplained or recurrent                     (1 Point)           (in the previous month)                               (1 Point)  [ ] Abnormal pulmonary function test                     (1 Point)                 SURGERY RELATED RISK FACTORS  [ ] Acute myocardial infarction                              (1 Point)                 [ ]  Section                                             (1 Point)  [ ] Congestive heart failure (in the previous month)  (1 Point)               [x ] Minor surgery                                                  (1 Point)   [ ] Inflammatory bowel disease                             (1 Point)                 [ ] Arthroscopic surgery                                        (2 Points)  [ ] Central venous access                                      (2 Points)                [ ] General surgery lasting more than 45 minutes   (2 Points)       [ ] Stroke (in the previous month)                          (5 Points)               [ ] Elective arthroplasty                                         (5 Points)                                                                                                                                               HEMATOLOGY RELATED FACTORS                                                 TRAUMA RELATED RISK FACTORS  [ ] Prior episodes of VTE                                     (3 Points)                [ ] Fracture of the hip, pelvis, or leg                       (5 Points)  [ ] Positive family history for VTE                         (3 Points)                 [ ] Acute spinal cord injury (in the previous month)  (5 Points)  [ ] Prothrombin 15814 A                                     (3 Points)                 [ ] Paralysis  (less than 1 month)                             (5 Points)  [ ] Factor V Leiden                                             (3 Points)                  [ ] Multiple Trauma within 1 month                        (5 Points)  [ ] Lupus anticoagulants                                     (3 Points)                                                           [ ] Anticardiolipin antibodies                               (3 Points)                                                       [ ] High homocysteine in the blood                      (3 Points)                                             [ ] Other congenital or acquired thrombophilia      (3 Points)                                                [ ] Heparin induced thrombocytopenia                  (3 Points)                                          Total Score [     4     ]    Caprini Score 0 - 2:  Low Risk, No VTE Prophylaxis required for most patients, encourage ambulation  Caprini Score 3 - 6:  At Risk, pharmacologic VTE prophylaxis is indicated for most patients (in the absence of a contraindication)  Caprini Score Greater than or = 7:  High Risk, pharmacologic VTE prophylaxis is indicated for most patients (in the absence of a contraindication)    OPIOID RISK TOOL    MASON EACH BOX THAT APPLIES AND ADD TOTALS AT THE END    FAMILY HISTORY OF SUBSTANCE ABUSE                 FEMALE         MALE                                                Alcohol                             [  ]1 pt          [  ]3pts                                               Illegal Durgs                     [  ]2 pts        [  ]3pts                                               Rx Drugs                           [  ]4 pts        [  ]4 pts    PERSONAL HISTORY OF SUBSTANCE ABUSE                                                                                          Alcohol                             [  ]3 pts       [  ]3 pts                                               Illegal Durgs                     [  ]4 pts        [  ]4 pts                                               Rx Drugs                           [  ]5 pts        [  ]5 pts    AGE BETWEEN 16-45 YEARS                                      [  ]1 pt         [  ]1 pt    HISTORY OF PREADOLESCENT   SEXUAL ABUSE                                                             [  ]3 pts        [  ]0pts    PSYCHOLOGICAL DISEASE                     ADD, OCD, Bipolar, Schizophrenia        [  ]2 pts         [  ]2 pts                      Depression                                               [  ]1 pt           [  ]1 pt           SCORING TOTAL   0                             A score of 3 or lower indicated LOW risk for future opiod abuse  A score of 4 to 7 indicated moderate risk for future opiod abuse  A score of 8 or higher indicates a high risk for opiod abuse    70 year old male PMH of HTN, HLD, BPH, AICD chronic back pain recent surgery in July for small bowel obstruction and lysis of adhesions followed by open left inguinal hernia repair with mesh in 2021. Following surgery patient developed incisional hernia. He presents to [facility] today for pre operative evaluation for abdominal botox in anticipation of future surgery to repair incisional hernia. Patient reports mild discomfort at incisional hernia repair, worse with lifting. Patient reports he intentionally lost 10 pounds, he denies fever, chills, nausea, vomiting diarhea or constipation.  Patient is scheduled for US guided abdominal botox with anesthesia on 3/15/21 in Interventional Radiology (Dr Ramírez ordering),  pending medical and cardiology clearance. Patient educated on surgical scrub, COVID testing, preadmission instructions, medical & cardiac clearance and day of procedure medications, verbalizes understanding. Pt instructed to stop vitamins/supplements/herbal medications/ASA/NSAIDS for one week prior to surgery and discuss with PMD. Patient recently started smoking cigarettes again, 1 pk every 3 days, denies wheezing, coughing, shortness of breath, CXR from Aug revealed atelectasis, repeat CXR today at [facility], results pending. Patient has [employer] Model# [id number], Serial# [id number], rep notified [facility] for DOS.

## 2021-03-08 NOTE — H&P PST ADULT - EKG AND INTERPRETATION
NSR 67, left axis deviation, inferior infarct age undetermined, cannot rule out anterior infarct   pending final interpretation

## 2021-03-10 RX ORDER — ONABOTULINUMTOXINA 100 UNIT
300 VIAL (EA) INJECTION ONCE
Refills: 0 | Status: DISCONTINUED | OUTPATIENT
Start: 2021-03-15 | End: 2021-03-29

## 2021-03-12 ENCOUNTER — APPOINTMENT (OUTPATIENT)
Dept: DISASTER EMERGENCY | Facility: CLINIC | Age: 71
End: 2021-03-12

## 2021-03-12 ENCOUNTER — APPOINTMENT (OUTPATIENT)
Dept: SURGERY | Facility: HOSPITAL | Age: 71
End: 2021-03-12

## 2021-03-13 LAB — SARS-COV-2 N GENE NPH QL NAA+PROBE: NOT DETECTED

## 2021-03-15 ENCOUNTER — RESULT REVIEW (OUTPATIENT)
Age: 71
End: 2021-03-15

## 2021-03-15 ENCOUNTER — OUTPATIENT (OUTPATIENT)
Dept: OUTPATIENT SERVICES | Facility: HOSPITAL | Age: 71
LOS: 1 days | End: 2021-03-15
Payer: MEDICARE

## 2021-03-15 VITALS
TEMPERATURE: 98 F | HEIGHT: 75 IN | RESPIRATION RATE: 20 BRPM | HEART RATE: 71 BPM | WEIGHT: 207.9 LBS | OXYGEN SATURATION: 95 % | DIASTOLIC BLOOD PRESSURE: 63 MMHG | SYSTOLIC BLOOD PRESSURE: 121 MMHG

## 2021-03-15 VITALS
DIASTOLIC BLOOD PRESSURE: 60 MMHG | SYSTOLIC BLOOD PRESSURE: 138 MMHG | HEART RATE: 76 BPM | TEMPERATURE: 99 F | RESPIRATION RATE: 16 BRPM | OXYGEN SATURATION: 95 %

## 2021-03-15 DIAGNOSIS — Z95.810 PRESENCE OF AUTOMATIC (IMPLANTABLE) CARDIAC DEFIBRILLATOR: Chronic | ICD-10-CM

## 2021-03-15 DIAGNOSIS — Z98.890 OTHER SPECIFIED POSTPROCEDURAL STATES: Chronic | ICD-10-CM

## 2021-03-15 DIAGNOSIS — K46.9 UNSPECIFIED ABDOMINAL HERNIA WITHOUT OBSTRUCTION OR GANGRENE: Chronic | ICD-10-CM

## 2021-03-15 DIAGNOSIS — K56.609 UNSPECIFIED INTESTINAL OBSTRUCTION, UNSPECIFIED AS TO PARTIAL VERSUS COMPLETE OBSTRUCTION: Chronic | ICD-10-CM

## 2021-03-15 DIAGNOSIS — K43.2 INCISIONAL HERNIA WITHOUT OBSTRUCTION OR GANGRENE: ICD-10-CM

## 2021-03-15 PROCEDURE — 85610 PROTHROMBIN TIME: CPT

## 2021-03-15 PROCEDURE — 64647 CHEMODENERV TRUNK MUSC 6/>: CPT

## 2021-03-15 PROCEDURE — 85730 THROMBOPLASTIN TIME PARTIAL: CPT

## 2021-03-15 PROCEDURE — 71046 X-RAY EXAM CHEST 2 VIEWS: CPT

## 2021-03-15 PROCEDURE — 76942 ECHO GUIDE FOR BIOPSY: CPT

## 2021-03-15 PROCEDURE — 93005 ELECTROCARDIOGRAM TRACING: CPT

## 2021-03-15 PROCEDURE — 85025 COMPLETE CBC W/AUTO DIFF WBC: CPT

## 2021-03-15 PROCEDURE — 36415 COLL VENOUS BLD VENIPUNCTURE: CPT

## 2021-03-15 PROCEDURE — G0463: CPT

## 2021-03-15 PROCEDURE — 76942 ECHO GUIDE FOR BIOPSY: CPT | Mod: 26

## 2021-03-15 PROCEDURE — 80053 COMPREHEN METABOLIC PANEL: CPT

## 2021-03-15 NOTE — PROGRESS NOTE ADULT - SUBJECTIVE AND OBJECTIVE BOX
IR PRE-PROCEDURE NOTE  DIAGNOSIS: abdominal hernia,   PROCEDURE: abdominal wall musculature botox injection  RECENT CHANGES: None  PERTINENT LABS: Within acceptable limits.  IMAGING: Reviewed  CONSENT: On chart

## 2021-03-29 PROBLEM — K43.2 INCISIONAL HERNIA WITHOUT OBSTRUCTION OR GANGRENE: Chronic | Status: ACTIVE | Noted: 2021-03-08

## 2021-04-01 ENCOUNTER — APPOINTMENT (OUTPATIENT)
Dept: SURGERY | Facility: CLINIC | Age: 71
End: 2021-04-01
Payer: MEDICARE

## 2021-04-01 VITALS
SYSTOLIC BLOOD PRESSURE: 107 MMHG | BODY MASS INDEX: 25.74 KG/M2 | WEIGHT: 207 LBS | HEART RATE: 81 BPM | TEMPERATURE: 98 F | OXYGEN SATURATION: 95 % | HEIGHT: 75 IN | DIASTOLIC BLOOD PRESSURE: 69 MMHG

## 2021-04-01 PROCEDURE — 99214 OFFICE O/P EST MOD 30 MIN: CPT

## 2021-04-01 PROCEDURE — 99072 ADDL SUPL MATRL&STAF TM PHE: CPT

## 2021-04-04 NOTE — PHYSICAL EXAM
[JVD] : no jugular venous distention  [No Rash or Lesion] : No rash or lesion [Alert] : alert [Oriented to Person] : oriented to person [Oriented to Place] : oriented to place [Oriented to Time] : oriented to time [Calm] : calm [de-identified] : No acute distress [de-identified] : No respiratory distress [de-identified] : Regular rate [de-identified] : soft, nontender. no rebound or guarding. large supraumbilical incisional hernia, bulge appreciated with valsalva. increased laxity of abdominal musculature appreciated. no evidence of recurrence at left inguinal hernia. skin incisions well-healed [de-identified] : normal range of motion

## 2021-04-04 NOTE — PLAN
[FreeTextEntry1] : Plan for open retrorectus incisional hernia repair with mesh, possible component separation

## 2021-04-04 NOTE — ASSESSMENT
[FreeTextEntry1] : Mr. GRANT is a 70 year old man with incisional hernia s/p preoperative botox treatment. We discussed the options of robotic/laparoscopic repair, open repair, and nonoperative management. We again discussed the benefit of preoperative botox and the possibility that doing so will allow us to not perform a component separation. The risks/benefits and alternatives were discussed at length and all questions were answered. We discussed the risks and benefits of the use of mesh. The patient appears to understand and wishes to proceed with open retrorectus incisional hernia repair with mesh, possible component separation.

## 2021-04-04 NOTE — HISTORY OF PRESENT ILLNESS
[de-identified] : Mr. GRANT is a 69 year old man s/p diagnostic laparoscopy converted to exploratory laparotomy, extensive lysis of adhesions, and small bowel resection with primary anastomosis on 7/29/20 and s/p open left inguinal hernia repair on 10/21/20, last seen in office on 1/14/2021 with incisional hernia who returns today for preop visit. Pt had bilateral botox injection with IR on 3/15/2021. Since that time, pt reports that his abdomen has "softened up" and he reports decreased discomfort at hernia site. Denies pain or bulging at site of left inguinal hernia repair. Denies fever/chills. Tolerating diet. Normal bowel movements.\par \par CT again reviewed - hernia defect 12cm between medial borders of each rectus muscle

## 2021-04-12 ENCOUNTER — OUTPATIENT (OUTPATIENT)
Dept: OUTPATIENT SERVICES | Facility: HOSPITAL | Age: 71
LOS: 1 days | End: 2021-04-12
Payer: MEDICARE

## 2021-04-12 VITALS
SYSTOLIC BLOOD PRESSURE: 100 MMHG | TEMPERATURE: 98 F | RESPIRATION RATE: 18 BRPM | HEIGHT: 74 IN | DIASTOLIC BLOOD PRESSURE: 70 MMHG | HEART RATE: 65 BPM | WEIGHT: 209.88 LBS

## 2021-04-12 DIAGNOSIS — Z29.9 ENCOUNTER FOR PROPHYLACTIC MEASURES, UNSPECIFIED: ICD-10-CM

## 2021-04-12 DIAGNOSIS — Z98.890 OTHER SPECIFIED POSTPROCEDURAL STATES: Chronic | ICD-10-CM

## 2021-04-12 DIAGNOSIS — K43.2 INCISIONAL HERNIA WITHOUT OBSTRUCTION OR GANGRENE: ICD-10-CM

## 2021-04-12 DIAGNOSIS — Z95.810 PRESENCE OF AUTOMATIC (IMPLANTABLE) CARDIAC DEFIBRILLATOR: Chronic | ICD-10-CM

## 2021-04-12 DIAGNOSIS — Z91.89 OTHER SPECIFIED PERSONAL RISK FACTORS, NOT ELSEWHERE CLASSIFIED: ICD-10-CM

## 2021-04-12 DIAGNOSIS — Z95.810 PRESENCE OF AUTOMATIC (IMPLANTABLE) CARDIAC DEFIBRILLATOR: ICD-10-CM

## 2021-04-12 DIAGNOSIS — I10 ESSENTIAL (PRIMARY) HYPERTENSION: ICD-10-CM

## 2021-04-12 DIAGNOSIS — K46.9 UNSPECIFIED ABDOMINAL HERNIA WITHOUT OBSTRUCTION OR GANGRENE: Chronic | ICD-10-CM

## 2021-04-12 DIAGNOSIS — K56.609 UNSPECIFIED INTESTINAL OBSTRUCTION, UNSPECIFIED AS TO PARTIAL VERSUS COMPLETE OBSTRUCTION: Chronic | ICD-10-CM

## 2021-04-12 DIAGNOSIS — Z01.818 ENCOUNTER FOR OTHER PREPROCEDURAL EXAMINATION: ICD-10-CM

## 2021-04-12 LAB
ANION GAP SERPL CALC-SCNC: 9 MMOL/L — SIGNIFICANT CHANGE UP (ref 5–17)
APTT BLD: 35 SEC — SIGNIFICANT CHANGE UP (ref 27.5–35.5)
BASOPHILS # BLD AUTO: 0.04 K/UL — SIGNIFICANT CHANGE UP (ref 0–0.2)
BASOPHILS NFR BLD AUTO: 0.5 % — SIGNIFICANT CHANGE UP (ref 0–2)
BLD GP AB SCN SERPL QL: SIGNIFICANT CHANGE UP
BUN SERPL-MCNC: 15 MG/DL — SIGNIFICANT CHANGE UP (ref 8–20)
CALCIUM SERPL-MCNC: 8.9 MG/DL — SIGNIFICANT CHANGE UP (ref 8.6–10.2)
CHLORIDE SERPL-SCNC: 106 MMOL/L — SIGNIFICANT CHANGE UP (ref 98–107)
CO2 SERPL-SCNC: 25 MMOL/L — SIGNIFICANT CHANGE UP (ref 22–29)
CREAT SERPL-MCNC: 1.14 MG/DL — SIGNIFICANT CHANGE UP (ref 0.5–1.3)
EOSINOPHIL # BLD AUTO: 0.22 K/UL — SIGNIFICANT CHANGE UP (ref 0–0.5)
EOSINOPHIL NFR BLD AUTO: 2.6 % — SIGNIFICANT CHANGE UP (ref 0–6)
GLUCOSE SERPL-MCNC: 97 MG/DL — SIGNIFICANT CHANGE UP (ref 70–99)
HCT VFR BLD CALC: 42.6 % — SIGNIFICANT CHANGE UP (ref 39–50)
HGB BLD-MCNC: 13.2 G/DL — SIGNIFICANT CHANGE UP (ref 13–17)
IMM GRANULOCYTES NFR BLD AUTO: 0.2 % — SIGNIFICANT CHANGE UP (ref 0–1.5)
INR BLD: 1.15 RATIO — SIGNIFICANT CHANGE UP (ref 0.88–1.16)
LYMPHOCYTES # BLD AUTO: 1.92 K/UL — SIGNIFICANT CHANGE UP (ref 1–3.3)
LYMPHOCYTES # BLD AUTO: 22.3 % — SIGNIFICANT CHANGE UP (ref 13–44)
MCHC RBC-ENTMCNC: 26.3 PG — LOW (ref 27–34)
MCHC RBC-ENTMCNC: 31 GM/DL — LOW (ref 32–36)
MCV RBC AUTO: 85 FL — SIGNIFICANT CHANGE UP (ref 80–100)
MONOCYTES # BLD AUTO: 0.59 K/UL — SIGNIFICANT CHANGE UP (ref 0–0.9)
MONOCYTES NFR BLD AUTO: 6.9 % — SIGNIFICANT CHANGE UP (ref 2–14)
MRSA PCR RESULT.: SIGNIFICANT CHANGE UP
NEUTROPHILS # BLD AUTO: 5.82 K/UL — SIGNIFICANT CHANGE UP (ref 1.8–7.4)
NEUTROPHILS NFR BLD AUTO: 67.5 % — SIGNIFICANT CHANGE UP (ref 43–77)
PLATELET # BLD AUTO: 232 K/UL — SIGNIFICANT CHANGE UP (ref 150–400)
POTASSIUM SERPL-MCNC: 4.9 MMOL/L — SIGNIFICANT CHANGE UP (ref 3.5–5.3)
POTASSIUM SERPL-SCNC: 4.9 MMOL/L — SIGNIFICANT CHANGE UP (ref 3.5–5.3)
PROTHROM AB SERPL-ACNC: 13.2 SEC — SIGNIFICANT CHANGE UP (ref 10.6–13.6)
RBC # BLD: 5.01 M/UL — SIGNIFICANT CHANGE UP (ref 4.2–5.8)
RBC # FLD: 15.7 % — HIGH (ref 10.3–14.5)
S AUREUS DNA NOSE QL NAA+PROBE: SIGNIFICANT CHANGE UP
SODIUM SERPL-SCNC: 140 MMOL/L — SIGNIFICANT CHANGE UP (ref 135–145)
WBC # BLD: 8.61 K/UL — SIGNIFICANT CHANGE UP (ref 3.8–10.5)
WBC # FLD AUTO: 8.61 K/UL — SIGNIFICANT CHANGE UP (ref 3.8–10.5)

## 2021-04-12 PROCEDURE — 93010 ELECTROCARDIOGRAM REPORT: CPT

## 2021-04-12 PROCEDURE — 93005 ELECTROCARDIOGRAM TRACING: CPT

## 2021-04-12 PROCEDURE — G0463: CPT

## 2021-04-12 NOTE — H&P PST ADULT - ATTENDING COMMENTS
No change to patient condition  Plan for retrorectus incisional hernia repair with mesh, possible component separation  Risks/benefits discussed with patient. He understands, agrees, and wishes to proceed.   All questions answered.

## 2021-04-12 NOTE — H&P PST ADULT - NSICDXPROBLEM_GEN_ALL_CORE_FT
PROBLEM DIAGNOSES  Problem: Need for prophylactic measure  Assessment and Plan:     Problem: At risk for sleep apnea  Assessment and Plan:     Problem: Incisional hernia without obstruction or gangrene  Assessment and Plan:     Problem: AICD present, double chamber  Assessment and Plan:        PROBLEM DIAGNOSES  Problem: Incisional hernia without obstruction or gangrene  Assessment and Plan:  Patient is scheduled for open retrorectus incisional hernia repair, possible component separation on 4/21/21 with Dr Ramírez. Medical and cardiac pending    Problem: AICD present, double chamber  Assessment and Plan[facility] and notified of date of procedure. Patient reports device last interrogated a few months ago, will obtain from cardiologist, clearance pending    Problem: Hypertension  Assessment and Plan: /70 today left arm manual BP, patient encouraged to take BP reading at home and record values, patient denies any dizziness or symptoms, patient will see PCP and cardiologist, clearance pending     Problem: Need for prophylactic measure  Assessment and Plan: Caprini Score 5, at risk, surgical team to order appropriate VTE prophylaxis     Problem: At risk for sleep apnea  Assessment and Plan: Stop Bang 3, Intermediate Risk, MARIBELL precautions

## 2021-04-12 NOTE — H&P PST ADULT - EKG AND INTERPRETATION
NSR 61, left axis deviation, low voltage QRS, inferior infarct age undetermined, cannot rule out anterior infarct   pending final interpretation

## 2021-04-12 NOTE — ASU PATIENT PROFILE, ADULT - PSH
Abdominal hernia    History of back surgery    History of prostate surgery    ICD (implantable cardioverter-defibrillator) in place  6/26/2013  S/P right inguinal herniorrhaphy    SBO (small bowel obstruction)

## 2021-04-12 NOTE — H&P PST ADULT - HISTORY OF PRESENT ILLNESS
70 year old male PMH of HTN, HLD, BPH, dual chamber AICD placed 2013 last interrogated 2 months ago, chronic back pain recent surgery in July for small bowel obstruction and lysis of adhesions followed by open left inguinal hernia repair with mesh in October 2021. Following surgery patient developed incisional hernia. He presents to PST today following an abdominal botox injection in anticipation of upcoming incisional hernia repair. Patient reports mild back pain that he describes as dull and aching, 8/10 in severity worse with lifting. Taking hydrocortisone and chlorzoxazone with some relief. Patient reports he intentionally lost 10 pounds, reports he has been able to keep weight off, denies any fever, chills, chest pain, pressure or palpitations, nausea, vomiting, diarrhea constipation. Patient is scheduled for open retrorectus incisional hernia repair, possible component seperation on 4/21/21 with Dr Ramírez.   70 year old male PMH of HTN, HLD, BPH, dual chamber AICD placed 2013 last interrogated 2 months ago, chronic back pain recent surgery in July for small bowel obstruction and lysis of adhesions followed by open left inguinal hernia repair with mesh in October 2021. Following surgery patient developed incisional hernia. He presents to PST today following an abdominal botox injection in anticipation of upcoming incisional hernia repair. Patient reports injections were successful, area of injection is pain free, patient notices improved elasticity in his abdomen. Patient denies abdominal pain from the hernia, reports mild discomfort when lifting. Patient has chronic back taking hydrocortisone and chlorzoxazone with some relief. Patient reports he intentionally lost 10 pounds, reports he has been able to keep weight off, denies any fever, chills, chest pain, pressure or palpitations, nausea, vomiting, diarrhea constipation. Patient is scheduled for open retrorectus incisional hernia repair, possible component separation on 4/21/21 with Dr Ramírez.  medical and cardiac clearance pending

## 2021-04-12 NOTE — H&P PST ADULT - NSICDXPASTMEDICALHX_GEN_ALL_CORE_FT
PAST MEDICAL HISTORY:  BPH (benign prostatic hyperplasia)     Chronic back pain     H/O Clostridium difficile infection July 2020    HTN (hypertension)     Hyperlipidemia     Incisional hernia     Insomnia     Post traumatic stress disorder (PTSD)     Prostate CA     Weak heart

## 2021-04-12 NOTE — ASU PATIENT PROFILE, ADULT - PREOP PAIN SCORE

## 2021-04-12 NOTE — ASU PATIENT PROFILE, ADULT - PMH
BPH (benign prostatic hyperplasia)    Chronic back pain    H/O Clostridium difficile infection  July 2020  HTN (hypertension)    Hyperlipidemia    Incisional hernia    Insomnia    Post traumatic stress disorder (PTSD)    Prostate CA    Weak heart

## 2021-04-12 NOTE — ASU PATIENT PROFILE, ADULT - VISION (WITH CORRECTIVE LENSES IF THE PATIENT USUALLY WEARS THEM):
wears driving glasses/Partially impaired: cannot see medication labels or newsprint, but can see obstacles in path, and the surrounding layout; can count fingers at arm's length

## 2021-04-12 NOTE — H&P PST ADULT - ASSESSMENT
CAPRINI VTE 2.0 SCORE [CLOT updated 2019]    AGE RELATED RISK FACTORS                                                       MOBILITY RELATED FACTORS  [ ] Age 41-60 years                                            (1 Point)                    [ ] Bed rest                                                        (1 Point)  [x ] Age: 61-74 years                                           (2 Points)                  [ ] Plaster cast                                                   (2 Points)  [ ] Age= 75 years                                              (3 Points)                    [ ] Bed bound for more than 72 hours                 (2 Points)    DISEASE RELATED RISK FACTORS                                               GENDER SPECIFIC FACTORS  [ ] Edema in the lower extremities                       (1 Point)              [ ] Pregnancy                                                     (1 Point)  [ ] Varicose veins                                               (1 Point)                     [ ] Post-partum < 6 weeks                                   (1 Point)             [x ] BMI > 25 Kg/m2                                            (1 Point)                     [ ] Hormonal therapy  or oral contraception          (1 Point)                 [ ] Sepsis (in the previous month)                        (1 Point)               [ ] History of pregnancy complications                 (1 point)  [ ] Pneumonia or serious lung disease                                               [ ] Unexplained or recurrent                     (1 Point)           (in the previous month)                               (1 Point)  [ ] Abnormal pulmonary function test                     (1 Point)                 SURGERY RELATED RISK FACTORS  [ ] Acute myocardial infarction                              (1 Point)               [ ]  Section                                             (1 Point)  [ ] Congestive heart failure (in the previous month)  (1 Point)      [ ] Minor surgery                                                  (1 Point)   [ ] Inflammatory bowel disease                             (1 Point)               [ ] Arthroscopic surgery                                        (2 Points)  [ ] Central venous access                                      (2 Points)                [x ] General surgery lasting more than 45 minutes (2 points)  [ ] Malignancy- Present or previous                   (2 Points)                [ ] Elective arthroplasty                                         (5 points)    [ ] Stroke (in the previous month)                          (5 Points)                                                                                                                                                           HEMATOLOGY RELATED FACTORS                                                 TRAUMA RELATED RISK FACTORS  [ ] Prior episodes of VTE                                     (3 Points)                [ ] Fracture of the hip, pelvis, or leg                       (5 Points)  [ ] Positive family history for VTE                         (3 Points)             [ ] Acute spinal cord injury (in the previous month)  (5 Points)  [ ] Prothrombin 39915 A                                     (3 Points)               [ ] Paralysis  (less than 1 month)                             (5 Points)  [ ] Factor V Leiden                                             (3 Points)                  [ ] Multiple Trauma within 1 month                        (5 Points)  [ ] Lupus anticoagulants                                     (3 Points)                                                           [ ] Anticardiolipin antibodies                               (3 Points)                                                       [ ] High homocysteine in the blood                      (3 Points)                                             [ ] Other congenital or acquired thrombophilia      (3 Points)                                                [ ] Heparin induced thrombocytopenia                  (3 Points)                                     Total Score [    5      ]    OPIOID RISK TOOL    MASON EACH BOX THAT APPLIES AND ADD TOTALS AT THE END    FAMILY HISTORY OF SUBSTANCE ABUSE                 FEMALE         MALE                                                Alcohol                             [  ]1 pt          [  ]3pts                                               Illegal Durgs                     [  ]2 pts        [  ]3pts                                               Rx Drugs                           [  ]4 pts        [  ]4 pts    PERSONAL HISTORY OF SUBSTANCE ABUSE                                                                                          Alcohol                             [  ]3 pts       [  ]3 pts                                               Illegal Durgs                     [  ]4 pts        [  ]4 pts                                               Rx Drugs                           [  ]5 pts        [  ]5 pts    AGE BETWEEN 16-45 YEARS                                      [  ]1 pt         [  ]1 pt    HISTORY OF PREADOLESCENT   SEXUAL ABUSE                                                             [  ]3 pts        [  ]0pts    PSYCHOLOGICAL DISEASE                     ADD, OCD, Bipolar, Schizophrenia        [  ]2 pts         [  ]2 pts                      Depression                                               [  ]1 pt           [  ]1 pt           SCORING TOTAL   0                                  A score of 3 or lower indicated LOW risk for future opiod abuse  A score of 4 to 7 indicated moderate risk for future opiod abuse  A score of 8 or higher indicates a high risk for opiod abuse     CAPRINI VTE 2.0 SCORE [CLOT updated 2019]    AGE RELATED RISK FACTORS                                                       MOBILITY RELATED FACTORS  [ ] Age 41-60 years                                            (1 Point)                    [ ] Bed rest                                                        (1 Point)  [x ] Age: 61-74 years                                           (2 Points)                  [ ] Plaster cast                                                   (2 Points)  [ ] Age= 75 years                                              (3 Points)                    [ ] Bed bound for more than 72 hours                 (2 Points)    DISEASE RELATED RISK FACTORS                                               GENDER SPECIFIC FACTORS  [ ] Edema in the lower extremities                       (1 Point)              [ ] Pregnancy                                                     (1 Point)  [ ] Varicose veins                                               (1 Point)                     [ ] Post-partum < 6 weeks                                   (1 Point)             [x ] BMI > 25 Kg/m2                                            (1 Point)                     [ ] Hormonal therapy  or oral contraception          (1 Point)                 [ ] Sepsis (in the previous month)                        (1 Point)               [ ] History of pregnancy complications                 (1 point)  [ ] Pneumonia or serious lung disease                                               [ ] Unexplained or recurrent                     (1 Point)           (in the previous month)                               (1 Point)  [ ] Abnormal pulmonary function test                     (1 Point)                 SURGERY RELATED RISK FACTORS  [ ] Acute myocardial infarction                              (1 Point)               [ ]  Section                                             (1 Point)  [ ] Congestive heart failure (in the previous month)  (1 Point)      [ ] Minor surgery                                                  (1 Point)   [ ] Inflammatory bowel disease                             (1 Point)               [ ] Arthroscopic surgery                                        (2 Points)  [ ] Central venous access                                      (2 Points)                [x ] General surgery lasting more than 45 minutes (2 points)  [ ] Malignancy- Present or previous                   (2 Points)                [ ] Elective arthroplasty                                         (5 points)    [ ] Stroke (in the previous month)                          (5 Points)                                                                                                                                                           HEMATOLOGY RELATED FACTORS                                                 TRAUMA RELATED RISK FACTORS  [ ] Prior episodes of VTE                                     (3 Points)                [ ] Fracture of the hip, pelvis, or leg                       (5 Points)  [ ] Positive family history for VTE                         (3 Points)             [ ] Acute spinal cord injury (in the previous month)  (5 Points)  [ ] Prothrombin 92024 A                                     (3 Points)               [ ] Paralysis  (less than 1 month)                             (5 Points)  [ ] Factor V Leiden                                             (3 Points)                  [ ] Multiple Trauma within 1 month                        (5 Points)  [ ] Lupus anticoagulants                                     (3 Points)                                                           [ ] Anticardiolipin antibodies                               (3 Points)                                                       [ ] High homocysteine in the blood                      (3 Points)                                             [ ] Other congenital or acquired thrombophilia      (3 Points)                                                [ ] Heparin induced thrombocytopenia                  (3 Points)                                     Total Score [    5      ]    OPIOID RISK TOOL    MASON EACH BOX THAT APPLIES AND ADD TOTALS AT THE END    FAMILY HISTORY OF SUBSTANCE ABUSE                 FEMALE         MALE                                                Alcohol                             [  ]1 pt          [  ]3pts                                               Illegal Durgs                     [  ]2 pts        [  ]3pts                                               Rx Drugs                           [  ]4 pts        [  ]4 pts    PERSONAL HISTORY OF SUBSTANCE ABUSE                                                                                          Alcohol                             [  ]3 pts       [  ]3 pts                                               Illegal Durgs                     [  ]4 pts        [  ]4 pts                                               Rx Drugs                           [  ]5 pts        [  ]5 pts    AGE BETWEEN 16-45 YEARS                                      [  ]1 pt         [  ]1 pt    HISTORY OF PREADOLESCENT   SEXUAL ABUSE                                                             [  ]3 pts        [  ]0pts    PSYCHOLOGICAL DISEASE                     ADD, OCD, Bipolar, Schizophrenia        [  ]2 pts         [  ]2 pts                      Depression                                               [  ]1 pt           [  ]1 pt           SCORING TOTAL   0                                  A score of 3 or lower indicated LOW risk for future opiod abuse  A score of 4 to 7 indicated moderate risk for future opiod abuse  A score of 8 or higher indicates a high risk for opiod abuse    70 year old male PMH of HTN, HLD, BPH, dual chamber AICD placed 2013 last interrogated 2 months ago, chronic back pain recent surgery in July for small bowel obstruction and lysis of adhesions followed by open left inguinal hernia repair with mesh in 2021. Following surgery patient developed incisional hernia. He presents to PST today following an abdominal botox injection in anticipation of upcoming incisional hernia repair. Patient reports injections were successful, area of injection is pain free, patient notices improved elasticity in his abdomen. Patient denies abdominal pain from the hernia, reports mild discomfort when lifting. Patient has chronic back taking hydrocortisone and chlorzoxazone with some relief. Patient reports he intentionally lost 10 pounds, reports he has been able to keep weight off, denies any fever, chills, chest pain, pressure or palpitations, nausea, vomiting, diarrhea constipation. Patient is scheduled for open retrorectus incisional hernia repair, possible component separation on 21 with Dr Ramírez. Patient educated on surgical scrub, COVID testing , preadmission instructions, medical, cardiac clearance and day of procedure medications, verbalizes understanding. Pt instructed to stop vitamins/supplements/herbal medications/NSAIDS for one week prior to surgery and discuss with PMD. Discuss dose of hydrocortisone and need for stress dose with PCP. Stop aspirin as per cardiology.  CAPRINI VTE 2.0 SCORE [CLOT updated 2019]    AGE RELATED RISK FACTORS                                                       MOBILITY RELATED FACTORS  [ ] Age 41-60 years                                            (1 Point)                    [ ] Bed rest                                                        (1 Point)  [x ] Age: 61-74 years                                           (2 Points)                  [ ] Plaster cast                                                   (2 Points)  [ ] Age= 75 years                                              (3 Points)                    [ ] Bed bound for more than 72 hours                 (2 Points)    DISEASE RELATED RISK FACTORS                                               GENDER SPECIFIC FACTORS  [ ] Edema in the lower extremities                       (1 Point)              [ ] Pregnancy                                                     (1 Point)  [ ] Varicose veins                                               (1 Point)                     [ ] Post-partum < 6 weeks                                   (1 Point)             [x ] BMI > 25 Kg/m2                                            (1 Point)                     [ ] Hormonal therapy  or oral contraception          (1 Point)                 [ ] Sepsis (in the previous month)                        (1 Point)               [ ] History of pregnancy complications                 (1 point)  [ ] Pneumonia or serious lung disease                                               [ ] Unexplained or recurrent                     (1 Point)           (in the previous month)                               (1 Point)  [ ] Abnormal pulmonary function test                     (1 Point)                 SURGERY RELATED RISK FACTORS  [ ] Acute myocardial infarction                              (1 Point)               [ ]  Section                                             (1 Point)  [ ] Congestive heart failure (in the previous month)  (1 Point)      [ ] Minor surgery                                                  (1 Point)   [ ] Inflammatory bowel disease                             (1 Point)               [ ] Arthroscopic surgery                                        (2 Points)  [ ] Central venous access                                      (2 Points)                [x ] General surgery lasting more than 45 minutes (2 points)  [ ] Malignancy- Present or previous                   (2 Points)                [ ] Elective arthroplasty                                         (5 points)    [ ] Stroke (in the previous month)                          (5 Points)                                                                                                                                                           HEMATOLOGY RELATED FACTORS                                                 TRAUMA RELATED RISK FACTORS  [ ] Prior episodes of VTE                                     (3 Points)                [ ] Fracture of the hip, pelvis, or leg                       (5 Points)  [ ] Positive family history for VTE                         (3 Points)             [ ] Acute spinal cord injury (in the previous month)  (5 Points)  [ ] Prothrombin 05873 A                                     (3 Points)               [ ] Paralysis  (less than 1 month)                             (5 Points)  [ ] Factor V Leiden                                             (3 Points)                  [ ] Multiple Trauma within 1 month                        (5 Points)  [ ] Lupus anticoagulants                                     (3 Points)                                                           [ ] Anticardiolipin antibodies                               (3 Points)                                                       [ ] High homocysteine in the blood                      (3 Points)                                             [ ] Other congenital or acquired thrombophilia      (3 Points)                                                [ ] Heparin induced thrombocytopenia                  (3 Points)                                     Total Score [    5      ]    OPIOID RISK TOOL    MASON EACH BOX THAT APPLIES AND ADD TOTALS AT THE END    FAMILY HISTORY OF SUBSTANCE ABUSE                 FEMALE         MALE                                                Alcohol                             [  ]1 pt          [  ]3pts                                               Illegal Durgs                     [  ]2 pts        [  ]3pts                                               Rx Drugs                           [  ]4 pts        [  ]4 pts    PERSONAL HISTORY OF SUBSTANCE ABUSE                                                                                          Alcohol                             [  ]3 pts       [  ]3 pts                                               Illegal Durgs                     [  ]4 pts        [  ]4 pts                                               Rx Drugs                           [  ]5 pts        [  ]5 pts    AGE BETWEEN 16-45 YEARS                                      [  ]1 pt         [  ]1 pt    HISTORY OF PREADOLESCENT   SEXUAL ABUSE                                                             [  ]3 pts        [  ]0pts    PSYCHOLOGICAL DISEASE                     ADD, OCD, Bipolar, Schizophrenia        [  ]2 pts         [  ]2 pts                      Depression                                               [  ]1 pt           [  ]1 pt           SCORING TOTAL   0                                  A score of 3 or lower indicated LOW risk for future opiod abuse  A score of 4 to 7 indicated moderate risk for future opiod abuse  A score of 8 or higher indicates a high risk for opiod abuse    70 year old male PMH of HTN, HLD, BPH, dual chamber AICD placed 2013 last interrogated 2 months ago, chronic back pain recent surgery in July for small bowel obstruction and lysis of adhesions followed by open left inguinal hernia repair with mesh in 2021. Following surgery patient developed incisional hernia. He presents to PST today following an abdominal botox injection in anticipation of upcoming incisional hernia repair. Patient reports injections were successful, area of injection is pain free, patient notices improved elasticity in his abdomen. Patient denies abdominal pain from the hernia, reports mild discomfort when lifting. Patient has chronic back taking hydrocortisone and chlorzoxazone with some relief. Patient reports he intentionally lost 10 pounds, reports he has been able to keep weight off, denies any fever, chills, chest pain, pressure or palpitations, nausea, vomiting, diarrhea constipation. Patient is scheduled for open retrorectus incisional hernia repair, possible component separation on 21 with Dr Ramírez. Patient educated on surgical scrub, COVID testing , preadmission instructions, medical, cardiac clearance and day of procedure medications, verbalizes understanding. Pt instructed to stop vitamins/supplements/herbal medications/NSAIDS for one week prior to surgery and discuss with PMD. Discuss dose of hydrocortisone and need for stress dose with PCP. Stop aspirin as per cardiology. [facility] notified regarding patients AICD/pacemaker and DOS, spoke with fani Ohara at 13:25 on 21.

## 2021-04-18 ENCOUNTER — APPOINTMENT (OUTPATIENT)
Dept: DISASTER EMERGENCY | Facility: CLINIC | Age: 71
End: 2021-04-18

## 2021-04-18 LAB — SARS-COV-2 N GENE NPH QL NAA+PROBE: NOT DETECTED

## 2021-04-20 ENCOUNTER — TRANSCRIPTION ENCOUNTER (OUTPATIENT)
Age: 71
End: 2021-04-20

## 2021-04-21 ENCOUNTER — APPOINTMENT (OUTPATIENT)
Dept: SURGERY | Facility: HOSPITAL | Age: 71
End: 2021-04-21

## 2021-04-21 ENCOUNTER — INPATIENT (INPATIENT)
Facility: HOSPITAL | Age: 71
LOS: 3 days | Discharge: HOME | DRG: 354 | End: 2021-04-25
Attending: SURGERY | Admitting: SURGERY
Payer: MEDICARE

## 2021-04-21 ENCOUNTER — RESULT REVIEW (OUTPATIENT)
Age: 71
End: 2021-04-21

## 2021-04-21 VITALS
HEART RATE: 72 BPM | TEMPERATURE: 98 F | DIASTOLIC BLOOD PRESSURE: 65 MMHG | WEIGHT: 209.88 LBS | OXYGEN SATURATION: 100 % | SYSTOLIC BLOOD PRESSURE: 85 MMHG | RESPIRATION RATE: 16 BRPM | HEIGHT: 74 IN

## 2021-04-21 DIAGNOSIS — K46.9 UNSPECIFIED ABDOMINAL HERNIA WITHOUT OBSTRUCTION OR GANGRENE: Chronic | ICD-10-CM

## 2021-04-21 DIAGNOSIS — Z98.890 OTHER SPECIFIED POSTPROCEDURAL STATES: Chronic | ICD-10-CM

## 2021-04-21 DIAGNOSIS — K56.609 UNSPECIFIED INTESTINAL OBSTRUCTION, UNSPECIFIED AS TO PARTIAL VERSUS COMPLETE OBSTRUCTION: Chronic | ICD-10-CM

## 2021-04-21 DIAGNOSIS — Z95.810 PRESENCE OF AUTOMATIC (IMPLANTABLE) CARDIAC DEFIBRILLATOR: Chronic | ICD-10-CM

## 2021-04-21 DIAGNOSIS — K43.2 INCISIONAL HERNIA WITHOUT OBSTRUCTION OR GANGRENE: ICD-10-CM

## 2021-04-21 PROCEDURE — 15734 MUSCLE-SKIN GRAFT TRUNK: CPT

## 2021-04-21 PROCEDURE — 15734 MUSCLE-SKIN GRAFT TRUNK: CPT | Mod: 80

## 2021-04-21 PROCEDURE — 49585: CPT | Mod: 80

## 2021-04-21 PROCEDURE — 49566: CPT | Mod: 80,22

## 2021-04-21 PROCEDURE — 49566: CPT | Mod: 22

## 2021-04-21 PROCEDURE — 88302 TISSUE EXAM BY PATHOLOGIST: CPT | Mod: 26

## 2021-04-21 PROCEDURE — 49568: CPT | Mod: 59

## 2021-04-21 PROCEDURE — 49568: CPT | Mod: 80,59

## 2021-04-21 PROCEDURE — 49585: CPT

## 2021-04-21 PROCEDURE — 14000 TIS TRNFR TRUNK 10 SQ CM/<: CPT

## 2021-04-21 RX ORDER — HYDROMORPHONE HYDROCHLORIDE 2 MG/ML
0.5 INJECTION INTRAMUSCULAR; INTRAVENOUS; SUBCUTANEOUS
Refills: 0 | Status: DISCONTINUED | OUTPATIENT
Start: 2021-04-21 | End: 2021-04-21

## 2021-04-21 RX ORDER — CHLORHEXIDINE GLUCONATE 213 G/1000ML
1 SOLUTION TOPICAL
Refills: 0 | Status: DISCONTINUED | OUTPATIENT
Start: 2021-04-21 | End: 2021-04-25

## 2021-04-21 RX ORDER — SODIUM CHLORIDE 9 MG/ML
3 INJECTION INTRAMUSCULAR; INTRAVENOUS; SUBCUTANEOUS EVERY 8 HOURS
Refills: 0 | Status: DISCONTINUED | OUTPATIENT
Start: 2021-04-21 | End: 2021-04-21

## 2021-04-21 RX ORDER — BUPIVACAINE 13.3 MG/ML
20 INJECTION, SUSPENSION, LIPOSOMAL INFILTRATION ONCE
Refills: 0 | Status: DISCONTINUED | OUTPATIENT
Start: 2021-04-21 | End: 2021-04-21

## 2021-04-21 RX ORDER — ZOLPIDEM TARTRATE 10 MG/1
5 TABLET ORAL AT BEDTIME
Refills: 0 | Status: DISCONTINUED | OUTPATIENT
Start: 2021-04-21 | End: 2021-04-25

## 2021-04-21 RX ORDER — ACETAMINOPHEN 500 MG
975 TABLET ORAL EVERY 6 HOURS
Refills: 0 | Status: DISCONTINUED | OUTPATIENT
Start: 2021-04-21 | End: 2021-04-25

## 2021-04-21 RX ORDER — SENNA PLUS 8.6 MG/1
2 TABLET ORAL AT BEDTIME
Refills: 0 | Status: DISCONTINUED | OUTPATIENT
Start: 2021-04-21 | End: 2021-04-25

## 2021-04-21 RX ORDER — FENTANYL CITRATE 50 UG/ML
50 INJECTION INTRAVENOUS
Refills: 0 | Status: DISCONTINUED | OUTPATIENT
Start: 2021-04-21 | End: 2021-04-21

## 2021-04-21 RX ORDER — MORPHINE SULFATE 50 MG/1
2 CAPSULE, EXTENDED RELEASE ORAL EVERY 4 HOURS
Refills: 0 | Status: DISCONTINUED | OUTPATIENT
Start: 2021-04-21 | End: 2021-04-25

## 2021-04-21 RX ORDER — ONDANSETRON 8 MG/1
4 TABLET, FILM COATED ORAL EVERY 4 HOURS
Refills: 0 | Status: DISCONTINUED | OUTPATIENT
Start: 2021-04-21 | End: 2021-04-21

## 2021-04-21 RX ORDER — METHOCARBAMOL 500 MG/1
500 TABLET, FILM COATED ORAL
Refills: 0 | Status: DISCONTINUED | OUTPATIENT
Start: 2021-04-21 | End: 2021-04-25

## 2021-04-21 RX ORDER — OXYCODONE HYDROCHLORIDE 5 MG/1
5 TABLET ORAL EVERY 4 HOURS
Refills: 0 | Status: DISCONTINUED | OUTPATIENT
Start: 2021-04-21 | End: 2021-04-25

## 2021-04-21 RX ORDER — CARVEDILOL PHOSPHATE 80 MG/1
12.5 CAPSULE, EXTENDED RELEASE ORAL
Refills: 0 | Status: DISCONTINUED | OUTPATIENT
Start: 2021-04-21 | End: 2021-04-25

## 2021-04-21 RX ORDER — TAMSULOSIN HYDROCHLORIDE 0.4 MG/1
0.4 CAPSULE ORAL AT BEDTIME
Refills: 0 | Status: DISCONTINUED | OUTPATIENT
Start: 2021-04-21 | End: 2021-04-25

## 2021-04-21 RX ORDER — ACETAMINOPHEN 500 MG
975 TABLET ORAL ONCE
Refills: 0 | Status: COMPLETED | OUTPATIENT
Start: 2021-04-21 | End: 2021-04-21

## 2021-04-21 RX ORDER — ASPIRIN/CALCIUM CARB/MAGNESIUM 324 MG
81 TABLET ORAL DAILY
Refills: 0 | Status: DISCONTINUED | OUTPATIENT
Start: 2021-04-21 | End: 2021-04-23

## 2021-04-21 RX ORDER — ATORVASTATIN CALCIUM 80 MG/1
40 TABLET, FILM COATED ORAL DAILY
Refills: 0 | Status: DISCONTINUED | OUTPATIENT
Start: 2021-04-21 | End: 2021-04-25

## 2021-04-21 RX ORDER — TRAMADOL HYDROCHLORIDE 50 MG/1
50 TABLET ORAL EVERY 6 HOURS
Refills: 0 | Status: DISCONTINUED | OUTPATIENT
Start: 2021-04-21 | End: 2021-04-25

## 2021-04-21 RX ORDER — CEFAZOLIN SODIUM 1 G
2000 VIAL (EA) INJECTION ONCE
Refills: 0 | Status: DISCONTINUED | OUTPATIENT
Start: 2021-04-21 | End: 2021-04-25

## 2021-04-21 RX ORDER — ENOXAPARIN SODIUM 100 MG/ML
40 INJECTION SUBCUTANEOUS AT BEDTIME
Refills: 0 | Status: DISCONTINUED | OUTPATIENT
Start: 2021-04-21 | End: 2021-04-22

## 2021-04-21 RX ORDER — ZOLPIDEM TARTRATE 10 MG/1
5 TABLET ORAL AT BEDTIME
Refills: 0 | Status: DISCONTINUED | OUTPATIENT
Start: 2021-04-21 | End: 2021-04-21

## 2021-04-21 RX ORDER — GABAPENTIN 400 MG/1
300 CAPSULE ORAL ONCE
Refills: 0 | Status: COMPLETED | OUTPATIENT
Start: 2021-04-21 | End: 2021-04-21

## 2021-04-21 RX ORDER — CELECOXIB 200 MG/1
400 CAPSULE ORAL ONCE
Refills: 0 | Status: COMPLETED | OUTPATIENT
Start: 2021-04-21 | End: 2021-04-21

## 2021-04-21 RX ADMIN — SENNA PLUS 2 TABLET(S): 8.6 TABLET ORAL at 21:34

## 2021-04-21 RX ADMIN — MORPHINE SULFATE 2 MILLIGRAM(S): 50 CAPSULE, EXTENDED RELEASE ORAL at 22:39

## 2021-04-21 RX ADMIN — Medication 975 MILLIGRAM(S): at 10:17

## 2021-04-21 RX ADMIN — OXYCODONE HYDROCHLORIDE 5 MILLIGRAM(S): 5 TABLET ORAL at 20:41

## 2021-04-21 RX ADMIN — MORPHINE SULFATE 2 MILLIGRAM(S): 50 CAPSULE, EXTENDED RELEASE ORAL at 22:44

## 2021-04-21 RX ADMIN — CELECOXIB 400 MILLIGRAM(S): 200 CAPSULE ORAL at 10:16

## 2021-04-21 RX ADMIN — ENOXAPARIN SODIUM 40 MILLIGRAM(S): 100 INJECTION SUBCUTANEOUS at 21:34

## 2021-04-21 RX ADMIN — OXYCODONE HYDROCHLORIDE 5 MILLIGRAM(S): 5 TABLET ORAL at 19:41

## 2021-04-21 RX ADMIN — GABAPENTIN 300 MILLIGRAM(S): 400 CAPSULE ORAL at 10:17

## 2021-04-21 RX ADMIN — TAMSULOSIN HYDROCHLORIDE 0.4 MILLIGRAM(S): 0.4 CAPSULE ORAL at 21:34

## 2021-04-21 RX ADMIN — ZOLPIDEM TARTRATE 5 MILLIGRAM(S): 10 TABLET ORAL at 23:26

## 2021-04-21 RX ADMIN — Medication 975 MILLIGRAM(S): at 23:21

## 2021-04-21 RX ADMIN — MORPHINE SULFATE 2 MILLIGRAM(S): 50 CAPSULE, EXTENDED RELEASE ORAL at 18:10

## 2021-04-21 NOTE — H&P ADULT - NSHPPHYSICALEXAM_GEN_ALL_CORE
Constitutional: Well-developed well nourished Male in no acute distress  Respiratory: Breath sounds CTA b/l respirations are unlabored, no accessory muscle use, no conversational dyspnea  Cardiovascular: AICD in place   Chest: Chest wall is non-tender to palpation, no subQ emphysema or crepitus palpated  Gastrointestinal: Abdomen soft, Prevena dressing to midline, bilateral ALEXY drains in lower abdomen   Extremities: moving all extremities spontaneously, no point tenderness or deformity noted to upper or lower extremities b/l

## 2021-04-21 NOTE — CHART NOTE - NSCHARTNOTEFT_GEN_A_CORE
POSTOPERATIVE NOTE:    Patient seen and evaluated at bedside and found hemodynamically stable and in no acute distress. No acute events since surgery. Pain is well controlled, tolerating diet, without nausea or emesis. Drains with serosanguinous output, stripped. Denies fevers, chills, chest pain, SOB, coughing, dizziness, n/v/d, or generalized malaise.     STATUS POST:  open ventral hernia repair with retrorectus mesh placement and ALEXY x2    POST OPERATIVE DAY #: 0    SUBJECTIVE:    MEDICATIONS  (STANDING):  acetaminophen   Tablet .. 975 milliGRAM(s) Oral every 6 hours  aspirin  chewable 81 milliGRAM(s) Oral daily  atorvastatin Oral Tab/Cap - Peds 40 milliGRAM(s) Oral daily  carvedilol Oral Tab/Cap - Peds 12.5 milliGRAM(s) Oral two times a day  ceFAZolin   IVPB 2000 milliGRAM(s) IV Intermittent once  chlorhexidine 4% Liquid 1 Application(s) Topical <User Schedule>  enoxaparin Injectable 40 milliGRAM(s) SubCutaneous at bedtime  senna 2 Tablet(s) Oral at bedtime  tamsulosin 0.4 milliGRAM(s) Oral at bedtime    MEDICATIONS  (PRN):  methocarbamol 500 milliGRAM(s) Oral two times a day PRN Muscle Spasm  morphine  - Injectable 2 milliGRAM(s) IV Push every 4 hours PRN Severe Pain (7 - 10)  oxyCODONE    IR 5 milliGRAM(s) Oral every 4 hours PRN Severe Pain (7 - 10)  traMADol 50 milliGRAM(s) Oral every 6 hours PRN Moderate Pain (4 - 6)  zolpidem 5 milliGRAM(s) Oral at bedtime PRN Insomnia      Vital Signs Last 24 Hrs  T(C): 36.9 (21 Apr 2021 19:37), Max: 37.1 (21 Apr 2021 18:24)  T(F): 98.5 (21 Apr 2021 19:37), Max: 98.8 (21 Apr 2021 18:24)  HR: 90 (21 Apr 2021 19:37) (72 - 90)  BP: 118/76 (21 Apr 2021 19:37) (85/65 - 118/76)  BP(mean): --  RR: 18 (21 Apr 2021 19:37) (12 - 18)  SpO2: 95% (21 Apr 2021 19:37) (92% - 100%)    PHYSICAL EXAM:    General: lying in bed in no acute distress  HEENT: Neck supple  Chest: non-labored breathing or conversational dyspnea   Abdomen: prevena with good seal, JPx2 with srosanguinous output, abdomen non-distended, soft and depressible, non-tender. No guarding or rebound, non-peritonitic  Ext: no edema or cyanosis    I&O's Detail    21 Apr 2021 07:01  -  21 Apr 2021 19:48  --------------------------------------------------------  IN:  Total IN: 0 mL    OUT:    Bulb (mL): 10 mL    Voided (mL): 150 mL  Total OUT: 160 mL    Total NET: -160 mL      ASSESSMENT AND PLAN:    Patient is a 71yo M now s/p ventral hernia repair with retrorectus mesh placement and JPx2    Regular diet  Pain control  Monitor ALEXY output  OOB and ambulating   PT consult   DVT PPX

## 2021-04-21 NOTE — H&P ADULT - ASSESSMENT
70 year old male PMH of HTN, HLD, BPH, dual chamber AICD, hx of large ventral hernia s/p abdominal botox injections. Patient now several hours s/p open ventral hernia repair with anterior component separation with synthetic mesh placement. Tolerated procedure well.    Ventral Hernia s/p repair   -Admit to surgery service under Dr. Pablo Ramírez  -Abdominal binder at all times  -Maintain Prevena to midline   -Monitor Abdominal ALEXY output  -OOB and ambulating   -Pain control, limit narcotics    HTN  -Home antihypertensives with hold parameters  -Hold Enalapril for now    Misc  -DVT PPx  -f/u PT consult

## 2021-04-21 NOTE — BRIEF OPERATIVE NOTE - NSICDXBRIEFPROCEDURE_GEN_ALL_CORE_FT
PROCEDURES:  Repair, hernia, ventral, open, using component separation technique, using mesh 21-Apr-2021 16:50:57  Cedric Marlow

## 2021-04-21 NOTE — BRIEF OPERATIVE NOTE - OPERATION/FINDINGS
Open repair of large ventral hernia defect ([id number]) with anterior component separation. Hernia sac completely excised. Insertion of synthetic mesh retrorectus. Fascia closed with 0-PDS. Two Jesus-Martinez drains placed in subcutaneous space to bulb suction. Skin closed with staples and Prevena dressing placed. Hemostasis achieved.

## 2021-04-21 NOTE — H&P ADULT - HISTORY OF PRESENT ILLNESS
70 year old male PMH of HTN, HLD, BPH, dual chamber AICD placed 2013 last interrogated 2 months ago, chronic back pain recent surgery in July for small bowel obstruction and lysis of adhesions followed by open left inguinal hernia repair with mesh in October 2021. Following surgery patient developed incisional hernia. He presents to PST today following an abdominal botox injection in anticipation of upcoming incisional hernia repair. Patient reports injections were successful, area of injection is pain free, patient notices improved elasticity in his abdomen. Patient denies abdominal pain from the hernia, reports mild discomfort when lifting. Patient has chronic back taking hydrocortisone and chlorzoxazone with some relief. Patient reports he intentionally lost 10 pounds, reports he has been able to keep weight off, denies any fever, chills, chest pain, pressure or palpitations, nausea, vomiting, diarrhea constipation. Patient taken to the OR today for open repair of large ventral hernia with anterior component separation and placement of mesh.

## 2021-04-22 LAB
ALBUMIN SERPL ELPH-MCNC: 2.6 G/DL — LOW (ref 3.3–5.2)
ALP SERPL-CCNC: 74 U/L — SIGNIFICANT CHANGE UP (ref 40–120)
ALT FLD-CCNC: 10 U/L — SIGNIFICANT CHANGE UP
ANION GAP SERPL CALC-SCNC: 11 MMOL/L — SIGNIFICANT CHANGE UP (ref 5–17)
ANION GAP SERPL CALC-SCNC: 14 MMOL/L — SIGNIFICANT CHANGE UP (ref 5–17)
AST SERPL-CCNC: 11 U/L — SIGNIFICANT CHANGE UP
BASOPHILS # BLD AUTO: 0.02 K/UL — SIGNIFICANT CHANGE UP (ref 0–0.2)
BASOPHILS NFR BLD AUTO: 0.1 % — SIGNIFICANT CHANGE UP (ref 0–2)
BILIRUB DIRECT SERPL-MCNC: 0.1 MG/DL — SIGNIFICANT CHANGE UP (ref 0–0.3)
BILIRUB INDIRECT FLD-MCNC: SIGNIFICANT CHANGE UP MG/DL (ref 0.2–1)
BILIRUB SERPL-MCNC: <0.2 MG/DL — LOW (ref 0.4–2)
BUN SERPL-MCNC: 33 MG/DL — HIGH (ref 8–20)
BUN SERPL-MCNC: 36 MG/DL — HIGH (ref 8–20)
CALCIUM SERPL-MCNC: 5.6 MG/DL — CRITICAL LOW (ref 8.6–10.2)
CALCIUM SERPL-MCNC: 8.2 MG/DL — LOW (ref 8.6–10.2)
CHLORIDE SERPL-SCNC: 100 MMOL/L — SIGNIFICANT CHANGE UP (ref 98–107)
CHLORIDE SERPL-SCNC: 115 MMOL/L — HIGH (ref 98–107)
CO2 SERPL-SCNC: 13 MMOL/L — LOW (ref 22–29)
CO2 SERPL-SCNC: 19 MMOL/L — LOW (ref 22–29)
COVID-19 SPIKE DOMAIN AB INTERP: POSITIVE
COVID-19 SPIKE DOMAIN ANTIBODY RESULT: >250 U/ML — HIGH
CREAT SERPL-MCNC: 0.96 MG/DL — SIGNIFICANT CHANGE UP (ref 0.5–1.3)
CREAT SERPL-MCNC: 1.46 MG/DL — HIGH (ref 0.5–1.3)
EOSINOPHIL # BLD AUTO: 0.28 K/UL — SIGNIFICANT CHANGE UP (ref 0–0.5)
EOSINOPHIL NFR BLD AUTO: 1.9 % — SIGNIFICANT CHANGE UP (ref 0–6)
GLUCOSE SERPL-MCNC: 120 MG/DL — HIGH (ref 70–99)
GLUCOSE SERPL-MCNC: 89 MG/DL — SIGNIFICANT CHANGE UP (ref 70–99)
HCT VFR BLD CALC: 40.6 % — SIGNIFICANT CHANGE UP (ref 39–50)
HGB BLD-MCNC: 12.9 G/DL — LOW (ref 13–17)
IMM GRANULOCYTES NFR BLD AUTO: 0.3 % — SIGNIFICANT CHANGE UP (ref 0–1.5)
LYMPHOCYTES # BLD AUTO: 0.95 K/UL — LOW (ref 1–3.3)
LYMPHOCYTES # BLD AUTO: 6.6 % — LOW (ref 13–44)
MAGNESIUM SERPL-MCNC: 2.1 MG/DL — SIGNIFICANT CHANGE UP (ref 1.6–2.6)
MCHC RBC-ENTMCNC: 26.3 PG — LOW (ref 27–34)
MCHC RBC-ENTMCNC: 31.8 GM/DL — LOW (ref 32–36)
MCV RBC AUTO: 82.9 FL — SIGNIFICANT CHANGE UP (ref 80–100)
MONOCYTES # BLD AUTO: 1.05 K/UL — HIGH (ref 0–0.9)
MONOCYTES NFR BLD AUTO: 7.3 % — SIGNIFICANT CHANGE UP (ref 2–14)
NEUTROPHILS # BLD AUTO: 12.14 K/UL — HIGH (ref 1.8–7.4)
NEUTROPHILS NFR BLD AUTO: 83.8 % — HIGH (ref 43–77)
OSMOLALITY SERPL: 298 MOSMOL/KG — SIGNIFICANT CHANGE UP (ref 280–301)
PLATELET # BLD AUTO: 284 K/UL — SIGNIFICANT CHANGE UP (ref 150–400)
POTASSIUM SERPL-MCNC: 3.6 MMOL/L — SIGNIFICANT CHANGE UP (ref 3.5–5.3)
POTASSIUM SERPL-MCNC: 5.5 MMOL/L — HIGH (ref 3.5–5.3)
POTASSIUM SERPL-SCNC: 3.6 MMOL/L — SIGNIFICANT CHANGE UP (ref 3.5–5.3)
POTASSIUM SERPL-SCNC: 5.5 MMOL/L — HIGH (ref 3.5–5.3)
PROT SERPL-MCNC: 4.6 G/DL — LOW (ref 6.6–8.7)
RBC # BLD: 4.9 M/UL — SIGNIFICANT CHANGE UP (ref 4.2–5.8)
RBC # FLD: 15.8 % — HIGH (ref 10.3–14.5)
SARS-COV-2 IGG+IGM SERPL QL IA: >250 U/ML — HIGH
SARS-COV-2 IGG+IGM SERPL QL IA: POSITIVE
SODIUM SERPL-SCNC: 133 MMOL/L — LOW (ref 135–145)
SODIUM SERPL-SCNC: 139 MMOL/L — SIGNIFICANT CHANGE UP (ref 135–145)
WBC # BLD: 14.48 K/UL — HIGH (ref 3.8–10.5)
WBC # FLD AUTO: 14.48 K/UL — HIGH (ref 3.8–10.5)

## 2021-04-22 PROCEDURE — 99024 POSTOP FOLLOW-UP VISIT: CPT

## 2021-04-22 RX ORDER — SODIUM CHLORIDE 9 MG/ML
1000 INJECTION INTRAMUSCULAR; INTRAVENOUS; SUBCUTANEOUS
Refills: 0 | Status: DISCONTINUED | OUTPATIENT
Start: 2021-04-22 | End: 2021-04-23

## 2021-04-22 RX ORDER — SIMETHICONE 80 MG/1
80 TABLET, CHEWABLE ORAL
Refills: 0 | Status: DISCONTINUED | OUTPATIENT
Start: 2021-04-22 | End: 2021-04-25

## 2021-04-22 RX ORDER — CALCIUM GLUCONATE 100 MG/ML
2 VIAL (ML) INTRAVENOUS ONCE
Refills: 0 | Status: COMPLETED | OUTPATIENT
Start: 2021-04-22 | End: 2021-04-22

## 2021-04-22 RX ORDER — HEPARIN SODIUM 5000 [USP'U]/ML
5000 INJECTION INTRAVENOUS; SUBCUTANEOUS EVERY 8 HOURS
Refills: 0 | Status: DISCONTINUED | OUTPATIENT
Start: 2021-04-22 | End: 2021-04-25

## 2021-04-22 RX ORDER — SODIUM ZIRCONIUM CYCLOSILICATE 10 G/10G
5 POWDER, FOR SUSPENSION ORAL ONCE
Refills: 0 | Status: COMPLETED | OUTPATIENT
Start: 2021-04-22 | End: 2021-04-22

## 2021-04-22 RX ORDER — HEPARIN SODIUM 5000 [USP'U]/ML
5000 INJECTION INTRAVENOUS; SUBCUTANEOUS ONCE
Refills: 0 | Status: COMPLETED | OUTPATIENT
Start: 2021-04-22 | End: 2021-04-22

## 2021-04-22 RX ORDER — ONDANSETRON 8 MG/1
4 TABLET, FILM COATED ORAL ONCE
Refills: 0 | Status: COMPLETED | OUTPATIENT
Start: 2021-04-22 | End: 2021-04-22

## 2021-04-22 RX ORDER — SODIUM CHLORIDE 9 MG/ML
500 INJECTION INTRAMUSCULAR; INTRAVENOUS; SUBCUTANEOUS ONCE
Refills: 0 | Status: COMPLETED | OUTPATIENT
Start: 2021-04-22 | End: 2021-04-22

## 2021-04-22 RX ADMIN — OXYCODONE HYDROCHLORIDE 5 MILLIGRAM(S): 5 TABLET ORAL at 06:19

## 2021-04-22 RX ADMIN — Medication 975 MILLIGRAM(S): at 17:01

## 2021-04-22 RX ADMIN — Medication 30 MILLILITER(S): at 04:45

## 2021-04-22 RX ADMIN — CHLORHEXIDINE GLUCONATE 1 APPLICATION(S): 213 SOLUTION TOPICAL at 05:45

## 2021-04-22 RX ADMIN — MORPHINE SULFATE 2 MILLIGRAM(S): 50 CAPSULE, EXTENDED RELEASE ORAL at 08:12

## 2021-04-22 RX ADMIN — OXYCODONE HYDROCHLORIDE 5 MILLIGRAM(S): 5 TABLET ORAL at 13:27

## 2021-04-22 RX ADMIN — Medication 975 MILLIGRAM(S): at 18:00

## 2021-04-22 RX ADMIN — HEPARIN SODIUM 5000 UNIT(S): 5000 INJECTION INTRAVENOUS; SUBCUTANEOUS at 10:17

## 2021-04-22 RX ADMIN — Medication 975 MILLIGRAM(S): at 23:45

## 2021-04-22 RX ADMIN — TAMSULOSIN HYDROCHLORIDE 0.4 MILLIGRAM(S): 0.4 CAPSULE ORAL at 23:08

## 2021-04-22 RX ADMIN — OXYCODONE HYDROCHLORIDE 5 MILLIGRAM(S): 5 TABLET ORAL at 05:42

## 2021-04-22 RX ADMIN — HEPARIN SODIUM 5000 UNIT(S): 5000 INJECTION INTRAVENOUS; SUBCUTANEOUS at 23:09

## 2021-04-22 RX ADMIN — Medication 975 MILLIGRAM(S): at 00:21

## 2021-04-22 RX ADMIN — ONDANSETRON 4 MILLIGRAM(S): 8 TABLET, FILM COATED ORAL at 04:44

## 2021-04-22 RX ADMIN — Medication 200 GRAM(S): at 19:55

## 2021-04-22 RX ADMIN — Medication 975 MILLIGRAM(S): at 13:35

## 2021-04-22 RX ADMIN — OXYCODONE HYDROCHLORIDE 5 MILLIGRAM(S): 5 TABLET ORAL at 14:23

## 2021-04-22 RX ADMIN — SIMETHICONE 80 MILLIGRAM(S): 80 TABLET, CHEWABLE ORAL at 23:08

## 2021-04-22 RX ADMIN — ATORVASTATIN CALCIUM 40 MILLIGRAM(S): 80 TABLET, FILM COATED ORAL at 12:36

## 2021-04-22 RX ADMIN — SENNA PLUS 2 TABLET(S): 8.6 TABLET ORAL at 23:09

## 2021-04-22 RX ADMIN — Medication 975 MILLIGRAM(S): at 12:37

## 2021-04-22 RX ADMIN — Medication 975 MILLIGRAM(S): at 05:42

## 2021-04-22 RX ADMIN — Medication 30 MILLILITER(S): at 22:53

## 2021-04-22 RX ADMIN — MORPHINE SULFATE 2 MILLIGRAM(S): 50 CAPSULE, EXTENDED RELEASE ORAL at 09:09

## 2021-04-22 RX ADMIN — SODIUM CHLORIDE 1000 MILLILITER(S): 9 INJECTION INTRAMUSCULAR; INTRAVENOUS; SUBCUTANEOUS at 11:02

## 2021-04-22 RX ADMIN — Medication 975 MILLIGRAM(S): at 06:06

## 2021-04-22 RX ADMIN — Medication 975 MILLIGRAM(S): at 23:09

## 2021-04-22 RX ADMIN — CARVEDILOL PHOSPHATE 12.5 MILLIGRAM(S): 80 CAPSULE, EXTENDED RELEASE ORAL at 05:42

## 2021-04-22 RX ADMIN — SODIUM ZIRCONIUM CYCLOSILICATE 5 GRAM(S): 10 POWDER, FOR SUSPENSION ORAL at 09:52

## 2021-04-22 NOTE — PHYSICAL THERAPY INITIAL EVALUATION ADULT - GAIT DISTANCE, PT EVAL
150 feet x 2 with standing rest break, mild SOB. States he is having a hard time taking a deep breath 2/2 abdominal binder.

## 2021-04-22 NOTE — PHYSICAL THERAPY INITIAL EVALUATION ADULT - ADDITIONAL COMMENTS
Pt [place] a ranch [place], no CHANDANA. Owns a RW, cane, commode. Spouse is available to assist as needed 24/7.

## 2021-04-23 ENCOUNTER — TRANSCRIPTION ENCOUNTER (OUTPATIENT)
Age: 71
End: 2021-04-23

## 2021-04-23 LAB
ANION GAP SERPL CALC-SCNC: 13 MMOL/L — SIGNIFICANT CHANGE UP (ref 5–17)
ANION GAP SERPL CALC-SCNC: 13 MMOL/L — SIGNIFICANT CHANGE UP (ref 5–17)
BASOPHILS # BLD AUTO: 0.02 K/UL — SIGNIFICANT CHANGE UP (ref 0–0.2)
BASOPHILS # BLD AUTO: 0.02 K/UL — SIGNIFICANT CHANGE UP (ref 0–0.2)
BASOPHILS NFR BLD AUTO: 0.2 % — SIGNIFICANT CHANGE UP (ref 0–2)
BASOPHILS NFR BLD AUTO: 0.2 % — SIGNIFICANT CHANGE UP (ref 0–2)
BUN SERPL-MCNC: 32 MG/DL — HIGH (ref 8–20)
BUN SERPL-MCNC: 38 MG/DL — HIGH (ref 8–20)
CALCIUM SERPL-MCNC: 8.4 MG/DL — LOW (ref 8.6–10.2)
CALCIUM SERPL-MCNC: 8.4 MG/DL — LOW (ref 8.6–10.2)
CHLORIDE SERPL-SCNC: 100 MMOL/L — SIGNIFICANT CHANGE UP (ref 98–107)
CHLORIDE SERPL-SCNC: 102 MMOL/L — SIGNIFICANT CHANGE UP (ref 98–107)
CO2 SERPL-SCNC: 21 MMOL/L — LOW (ref 22–29)
CO2 SERPL-SCNC: 21 MMOL/L — LOW (ref 22–29)
CREAT SERPL-MCNC: 1.3 MG/DL — SIGNIFICANT CHANGE UP (ref 0.5–1.3)
CREAT SERPL-MCNC: 1.32 MG/DL — HIGH (ref 0.5–1.3)
EOSINOPHIL # BLD AUTO: 0.06 K/UL — SIGNIFICANT CHANGE UP (ref 0–0.5)
EOSINOPHIL # BLD AUTO: 0.1 K/UL — SIGNIFICANT CHANGE UP (ref 0–0.5)
EOSINOPHIL NFR BLD AUTO: 0.6 % — SIGNIFICANT CHANGE UP (ref 0–6)
EOSINOPHIL NFR BLD AUTO: 1 % — SIGNIFICANT CHANGE UP (ref 0–6)
GLUCOSE SERPL-MCNC: 105 MG/DL — HIGH (ref 70–99)
GLUCOSE SERPL-MCNC: 127 MG/DL — HIGH (ref 70–99)
HCT VFR BLD CALC: 33.8 % — LOW (ref 39–50)
HCT VFR BLD CALC: 35.5 % — LOW (ref 39–50)
HGB BLD-MCNC: 10.6 G/DL — LOW (ref 13–17)
HGB BLD-MCNC: 11.5 G/DL — LOW (ref 13–17)
IMM GRANULOCYTES NFR BLD AUTO: 0.4 % — SIGNIFICANT CHANGE UP (ref 0–1.5)
IMM GRANULOCYTES NFR BLD AUTO: 0.4 % — SIGNIFICANT CHANGE UP (ref 0–1.5)
LYMPHOCYTES # BLD AUTO: 1.17 K/UL — SIGNIFICANT CHANGE UP (ref 1–3.3)
LYMPHOCYTES # BLD AUTO: 1.47 K/UL — SIGNIFICANT CHANGE UP (ref 1–3.3)
LYMPHOCYTES # BLD AUTO: 11.4 % — LOW (ref 13–44)
LYMPHOCYTES # BLD AUTO: 15.2 % — SIGNIFICANT CHANGE UP (ref 13–44)
MAGNESIUM SERPL-MCNC: 2.5 MG/DL — SIGNIFICANT CHANGE UP (ref 1.6–2.6)
MCHC RBC-ENTMCNC: 26.6 PG — LOW (ref 27–34)
MCHC RBC-ENTMCNC: 26.9 PG — LOW (ref 27–34)
MCHC RBC-ENTMCNC: 31.4 GM/DL — LOW (ref 32–36)
MCHC RBC-ENTMCNC: 32.4 GM/DL — SIGNIFICANT CHANGE UP (ref 32–36)
MCV RBC AUTO: 82.9 FL — SIGNIFICANT CHANGE UP (ref 80–100)
MCV RBC AUTO: 84.7 FL — SIGNIFICANT CHANGE UP (ref 80–100)
MONOCYTES # BLD AUTO: 0.63 K/UL — SIGNIFICANT CHANGE UP (ref 0–0.9)
MONOCYTES # BLD AUTO: 0.83 K/UL — SIGNIFICANT CHANGE UP (ref 0–0.9)
MONOCYTES NFR BLD AUTO: 6.5 % — SIGNIFICANT CHANGE UP (ref 2–14)
MONOCYTES NFR BLD AUTO: 8.1 % — SIGNIFICANT CHANGE UP (ref 2–14)
NEUTROPHILS # BLD AUTO: 7.46 K/UL — HIGH (ref 1.8–7.4)
NEUTROPHILS # BLD AUTO: 8.07 K/UL — HIGH (ref 1.8–7.4)
NEUTROPHILS NFR BLD AUTO: 77.1 % — HIGH (ref 43–77)
NEUTROPHILS NFR BLD AUTO: 78.9 % — HIGH (ref 43–77)
PHOSPHATE SERPL-MCNC: 2.6 MG/DL — SIGNIFICANT CHANGE UP (ref 2.4–4.7)
PLATELET # BLD AUTO: 219 K/UL — SIGNIFICANT CHANGE UP (ref 150–400)
PLATELET # BLD AUTO: 240 K/UL — SIGNIFICANT CHANGE UP (ref 150–400)
POTASSIUM SERPL-MCNC: 4.6 MMOL/L — SIGNIFICANT CHANGE UP (ref 3.5–5.3)
POTASSIUM SERPL-MCNC: 5.1 MMOL/L — SIGNIFICANT CHANGE UP (ref 3.5–5.3)
POTASSIUM SERPL-SCNC: 4.6 MMOL/L — SIGNIFICANT CHANGE UP (ref 3.5–5.3)
POTASSIUM SERPL-SCNC: 5.1 MMOL/L — SIGNIFICANT CHANGE UP (ref 3.5–5.3)
RBC # BLD: 3.99 M/UL — LOW (ref 4.2–5.8)
RBC # BLD: 4.28 M/UL — SIGNIFICANT CHANGE UP (ref 4.2–5.8)
RBC # FLD: 15.9 % — HIGH (ref 10.3–14.5)
RBC # FLD: 15.9 % — HIGH (ref 10.3–14.5)
SODIUM SERPL-SCNC: 134 MMOL/L — LOW (ref 135–145)
SODIUM SERPL-SCNC: 136 MMOL/L — SIGNIFICANT CHANGE UP (ref 135–145)
SURGICAL PATHOLOGY STUDY: SIGNIFICANT CHANGE UP
WBC # BLD: 10.23 K/UL — SIGNIFICANT CHANGE UP (ref 3.8–10.5)
WBC # BLD: 9.68 K/UL — SIGNIFICANT CHANGE UP (ref 3.8–10.5)
WBC # FLD AUTO: 10.23 K/UL — SIGNIFICANT CHANGE UP (ref 3.8–10.5)
WBC # FLD AUTO: 9.68 K/UL — SIGNIFICANT CHANGE UP (ref 3.8–10.5)

## 2021-04-23 PROCEDURE — 99024 POSTOP FOLLOW-UP VISIT: CPT

## 2021-04-23 RX ORDER — SODIUM CHLORIDE 9 MG/ML
1000 INJECTION, SOLUTION INTRAVENOUS ONCE
Refills: 0 | Status: COMPLETED | OUTPATIENT
Start: 2021-04-23 | End: 2021-04-23

## 2021-04-23 RX ORDER — SENNA PLUS 8.6 MG/1
2 TABLET ORAL
Qty: 30 | Refills: 0
Start: 2021-04-23 | End: 2021-05-07

## 2021-04-23 RX ORDER — ACETAMINOPHEN 500 MG
3 TABLET ORAL
Qty: 0 | Refills: 0 | DISCHARGE
Start: 2021-04-23

## 2021-04-23 RX ORDER — METHOCARBAMOL 500 MG/1
1 TABLET, FILM COATED ORAL
Qty: 10 | Refills: 0
Start: 2021-04-23 | End: 2021-04-27

## 2021-04-23 RX ORDER — TRAMADOL HYDROCHLORIDE 50 MG/1
0.5 TABLET ORAL
Qty: 10 | Refills: 0
Start: 2021-04-23

## 2021-04-23 RX ADMIN — ZOLPIDEM TARTRATE 5 MILLIGRAM(S): 10 TABLET ORAL at 01:35

## 2021-04-23 RX ADMIN — CHLORHEXIDINE GLUCONATE 1 APPLICATION(S): 213 SOLUTION TOPICAL at 08:53

## 2021-04-23 RX ADMIN — Medication 975 MILLIGRAM(S): at 23:36

## 2021-04-23 RX ADMIN — Medication 975 MILLIGRAM(S): at 13:00

## 2021-04-23 RX ADMIN — SODIUM CHLORIDE 1000 MILLILITER(S): 9 INJECTION, SOLUTION INTRAVENOUS at 08:53

## 2021-04-23 RX ADMIN — HEPARIN SODIUM 5000 UNIT(S): 5000 INJECTION INTRAVENOUS; SUBCUTANEOUS at 12:00

## 2021-04-23 RX ADMIN — Medication 85 MILLIMOLE(S): at 16:57

## 2021-04-23 RX ADMIN — Medication 975 MILLIGRAM(S): at 05:32

## 2021-04-23 RX ADMIN — CARVEDILOL PHOSPHATE 12.5 MILLIGRAM(S): 80 CAPSULE, EXTENDED RELEASE ORAL at 16:57

## 2021-04-23 RX ADMIN — SENNA PLUS 2 TABLET(S): 8.6 TABLET ORAL at 21:20

## 2021-04-23 RX ADMIN — HEPARIN SODIUM 5000 UNIT(S): 5000 INJECTION INTRAVENOUS; SUBCUTANEOUS at 21:20

## 2021-04-23 RX ADMIN — TAMSULOSIN HYDROCHLORIDE 0.4 MILLIGRAM(S): 0.4 CAPSULE ORAL at 21:20

## 2021-04-23 RX ADMIN — SODIUM CHLORIDE 1000 MILLILITER(S): 9 INJECTION, SOLUTION INTRAVENOUS at 13:35

## 2021-04-23 RX ADMIN — ZOLPIDEM TARTRATE 5 MILLIGRAM(S): 10 TABLET ORAL at 23:36

## 2021-04-23 RX ADMIN — Medication 975 MILLIGRAM(S): at 12:00

## 2021-04-23 RX ADMIN — HEPARIN SODIUM 5000 UNIT(S): 5000 INJECTION INTRAVENOUS; SUBCUTANEOUS at 05:32

## 2021-04-23 RX ADMIN — Medication 975 MILLIGRAM(S): at 23:37

## 2021-04-23 RX ADMIN — SODIUM CHLORIDE 1000 MILLILITER(S): 9 INJECTION, SOLUTION INTRAVENOUS at 15:52

## 2021-04-23 RX ADMIN — OXYCODONE HYDROCHLORIDE 5 MILLIGRAM(S): 5 TABLET ORAL at 15:59

## 2021-04-23 RX ADMIN — Medication 975 MILLIGRAM(S): at 16:57

## 2021-04-23 RX ADMIN — ATORVASTATIN CALCIUM 40 MILLIGRAM(S): 80 TABLET, FILM COATED ORAL at 12:00

## 2021-04-23 NOTE — DISCHARGE NOTE PROVIDER - NSDCCPCAREPLAN_GEN_ALL_CORE_FT
PRINCIPAL DISCHARGE DIAGNOSIS  Diagnosis: S/P repair of ventral hernia  Assessment and Plan of Treatment: Follow up: Please call and make an appointment with the Dr. Ramírez for Thursday 4/29/21 after discharge. Also, please call and make an appointment with your primary care physician as per your usual schedule.   Activity: May return to normal activities as tolerated, however refrain from heavy lifting > 10-15 lbs.  Diet: May continue regular diet.  Medications: Please take all medications listed on your discharge paperwork as prescribed. Pain medication has been prescribed for you. Please, take it as it has been prescribed, do not drive or operate heavy machinery while taking narcotics.  You are encouraged to take over-the-counter tylenol and/or ibuprofen for pain relief when you feel your pain no longer warrants the use of narcotic pain medications.  Wound Care: Please, keep wound site clean and dry. You may shower, but do not bathe. VNS is set up for drain care and management, daily strip of the drain and record daily output. Please provide data on your follow up appointment on 4/29/21.  Patient is advised to RETURN TO THE EMERGENCY DEPARTMENT for any of the following - worsening pain, fever/chills, nausea/vomiting, altered mental status, chest pain, shortness of breath, or any other new / worsening symptom.

## 2021-04-23 NOTE — DISCHARGE NOTE PROVIDER - NSDCFUADDINST_GEN_ALL_CORE_FT
VNS for drain care management and care, requires daily strips of right and left ALEXY drain in lower abdomen, record output and description. Patient to provide data on follow appointment.

## 2021-04-23 NOTE — DISCHARGE NOTE PROVIDER - CARE PROVIDER_API CALL
Pablo Ramírez)  Surgery  Bariatric  [address] [place] [id number]  Phone: [phone]  Fax: [phone]  Established Patient  Scheduled Appointment: 04/29/2021

## 2021-04-23 NOTE — DISCHARGE NOTE NURSING/CASE MANAGEMENT/SOCIAL WORK - PATIENT PORTAL LINK FT
You can access the FollowMyHealth Patient Portal offered by [facility] by registering at the following website: [email or web address]. By joining [employer]’s FollowMyHealth portal, you will also be able to view your health information using other applications (apps) compatible with our system.

## 2021-04-23 NOTE — PROGRESS NOTE ADULT - ATTENDING COMMENTS
Tolerating liquid diet, pain well controlled. Ambulating well  Abdomen soft, nontender. Drains with serosaguinous output  Labs reviewed - will provide bolus LR and repeat BMP and CBC at noon  Will confirm H/H stable prior to resuming aspirin  Advance diet  DVT ppx  Possible discharge this afternoon if d/c criteria are met
POD1 following retrorectus incisional hernia repair with mesh and anterior component separation  Pain well controlled, pt reports belching, denies flatus  NPO with sips/chips only until return of bowel function  Ambulation, IS encouraged  IV hydration, recheck BMP this afternoon  DVT ppx

## 2021-04-23 NOTE — DISCHARGE NOTE PROVIDER - HOSPITAL COURSE
70 year old male PMH of HTN, HLD, BPH, dual chamber AICD placed 2013 last interrogated 2 months ago, chronic back pain recent surgery in July for small bowel obstruction and lysis of adhesions followed by open left inguinal hernia repair with mesh in October 2021. Following surgery patient developed incisional hernia. He presents to PST today following an abdominal botox injection in anticipation of upcoming incisional hernia repair. Patient reports injections were successful, area of injection is pain free, patient notices improved elasticity in his abdomen. Patient underwent open ventral hernia repair with retrorectus mesh placement left 2 ALEXY drains, no acute events postoperatively. On POD 1 patient c/o of belching and gas pain, he was made NPO and then advanced to CLD when symptoms improved, labs noticeable to ZANE and was placed on IVF, resolving to Cr 0.9. On POD 2 patient tolerated CLD, and was passing gas and having BM, labs with findings of ZANE once mor of 1.3, urine lytes were sent. VNS was set up for drain care and management. 70 year old male PMH of HTN, HLD, BPH, dual chamber AICD placed 2013 last interrogated 2 months ago, chronic back pain recent surgery in July for small bowel obstruction and lysis of adhesions followed by open left inguinal hernia repair with mesh in October 2021. Following surgery patient developed incisional hernia. He presents to PST today following an abdominal botox injection in anticipation of upcoming incisional hernia repair. Patient reports injections were successful, area of injection is pain free, patient notices improved elasticity in his abdomen. Patient underwent open ventral hernia repair with retrorectus mesh placement left 2 ALEXY drains, no acute events postoperatively. On POD 1 patient c/o of belching and gas pain, he was made NPO and then advanced to CLD when symptoms improved, labs noticeable to ZANE and was placed on IVF, resolving to Cr 0.9. On POD 2 patient tolerated CLD, and was passing gas and having BM, labs with findings of ZANE once mor of 1.3, urine lytes were sent, resolved with IVF. Hg trending down and stabilized. VNS was set up for drain care and management. Patient on 4/25, seen and evaluated at bedside found hemodynamically stable and in no acute distress, tolerating diet, having bowel function. Educated on drain care. Patient stable and cleared for discharge today.,

## 2021-04-23 NOTE — DISCHARGE NOTE PROVIDER - NSDCFUADDAPPT_GEN_ALL_CORE_FT
Please contact your Primary Care Provider to make an appointment within one week to followup on your renal function.

## 2021-04-23 NOTE — DISCHARGE NOTE PROVIDER - NSDCMRMEDTOKEN_GEN_ALL_CORE_FT
acetaminophen 325 mg oral tablet: 3 tab(s) orally every 6 hours  aspirin 81 mg oral tablet, chewable: 1 tab(s) orally once a day  atorvastatin 40 mg oral tablet: 1 tab(s) orally once a day  carvedilol 12.5 mg oral tablet: 1 tab(s) orally 2 times a day  chlorzoxazone 500 mg oral tablet: 1 tab(s) orally 2 times a day  enalapril 10 mg oral tablet: 1  orally 2 times a day  hydrocortisone 20 mg oral tablet:   magnesium oxide 400 mg (240 mg elemental magnesium) oral tablet: 1 tab(s) orally once a day  methocarbamol 500 mg oral tablet: 1 tab(s) orally 2 times a day, As needed, Muscle Spasm  senna oral tablet: 2 tab(s) orally once a day (at bedtime)  tamsulosin 0.4 mg oral capsule: 1 cap(s) orally once a day  traMADol 50 mg oral tablet: 0.5 tab(s) orally every 6 hours, As Needed -Severe Pain (4 - 6) MDD:Max 4  zolpidem 10 mg oral tablet: 1 tab(s) orally once a day (at bedtime)

## 2021-04-23 NOTE — DISCHARGE NOTE PROVIDER - NSDCHHASSISTILLNESS_GEN_ALL_CORE
Patient does not require assistance to leave residence nor it is contraindicated to leave residence.

## 2021-04-24 LAB
ANION GAP SERPL CALC-SCNC: 14 MMOL/L — SIGNIFICANT CHANGE UP (ref 5–17)
BLD GP AB SCN SERPL QL: SIGNIFICANT CHANGE UP
BUN SERPL-MCNC: 19 MG/DL — SIGNIFICANT CHANGE UP (ref 8–20)
CALCIUM SERPL-MCNC: 8.4 MG/DL — LOW (ref 8.6–10.2)
CHLORIDE SERPL-SCNC: 101 MMOL/L — SIGNIFICANT CHANGE UP (ref 98–107)
CO2 SERPL-SCNC: 21 MMOL/L — LOW (ref 22–29)
CREAT SERPL-MCNC: 1.08 MG/DL — SIGNIFICANT CHANGE UP (ref 0.5–1.3)
GAS PNL BLDA: SIGNIFICANT CHANGE UP
GLUCOSE SERPL-MCNC: 99 MG/DL — SIGNIFICANT CHANGE UP (ref 70–99)
HCT VFR BLD CALC: 28.6 % — LOW (ref 39–50)
HGB BLD-MCNC: 9 G/DL — LOW (ref 13–17)
MAGNESIUM SERPL-MCNC: 2 MG/DL — SIGNIFICANT CHANGE UP (ref 1.6–2.6)
MCHC RBC-ENTMCNC: 26.5 PG — LOW (ref 27–34)
MCHC RBC-ENTMCNC: 31.5 GM/DL — LOW (ref 32–36)
MCV RBC AUTO: 84.4 FL — SIGNIFICANT CHANGE UP (ref 80–100)
PHOSPHATE SERPL-MCNC: 2.7 MG/DL — SIGNIFICANT CHANGE UP (ref 2.4–4.7)
PLATELET # BLD AUTO: 197 K/UL — SIGNIFICANT CHANGE UP (ref 150–400)
PLATELET # BLD AUTO: SIGNIFICANT CHANGE UP K/UL (ref 150–400)
POTASSIUM SERPL-MCNC: 4.2 MMOL/L — SIGNIFICANT CHANGE UP (ref 3.5–5.3)
POTASSIUM SERPL-SCNC: 4.2 MMOL/L — SIGNIFICANT CHANGE UP (ref 3.5–5.3)
RBC # BLD: 3.39 M/UL — LOW (ref 4.2–5.8)
RBC # FLD: 15.7 % — HIGH (ref 10.3–14.5)
SODIUM SERPL-SCNC: 136 MMOL/L — SIGNIFICANT CHANGE UP (ref 135–145)
WBC # BLD: 7.06 K/UL — SIGNIFICANT CHANGE UP (ref 3.8–10.5)
WBC # FLD AUTO: 7.06 K/UL — SIGNIFICANT CHANGE UP (ref 3.8–10.5)

## 2021-04-24 RX ADMIN — Medication 975 MILLIGRAM(S): at 23:30

## 2021-04-24 RX ADMIN — Medication 975 MILLIGRAM(S): at 05:27

## 2021-04-24 RX ADMIN — CHLORHEXIDINE GLUCONATE 1 APPLICATION(S): 213 SOLUTION TOPICAL at 05:28

## 2021-04-24 RX ADMIN — HEPARIN SODIUM 5000 UNIT(S): 5000 INJECTION INTRAVENOUS; SUBCUTANEOUS at 23:00

## 2021-04-24 RX ADMIN — Medication 975 MILLIGRAM(S): at 22:56

## 2021-04-24 RX ADMIN — OXYCODONE HYDROCHLORIDE 5 MILLIGRAM(S): 5 TABLET ORAL at 22:59

## 2021-04-24 RX ADMIN — OXYCODONE HYDROCHLORIDE 5 MILLIGRAM(S): 5 TABLET ORAL at 06:26

## 2021-04-24 RX ADMIN — HEPARIN SODIUM 5000 UNIT(S): 5000 INJECTION INTRAVENOUS; SUBCUTANEOUS at 15:21

## 2021-04-24 RX ADMIN — OXYCODONE HYDROCHLORIDE 5 MILLIGRAM(S): 5 TABLET ORAL at 11:00

## 2021-04-24 RX ADMIN — OXYCODONE HYDROCHLORIDE 5 MILLIGRAM(S): 5 TABLET ORAL at 05:26

## 2021-04-24 RX ADMIN — OXYCODONE HYDROCHLORIDE 5 MILLIGRAM(S): 5 TABLET ORAL at 10:15

## 2021-04-24 RX ADMIN — Medication 975 MILLIGRAM(S): at 18:18

## 2021-04-24 RX ADMIN — HEPARIN SODIUM 5000 UNIT(S): 5000 INJECTION INTRAVENOUS; SUBCUTANEOUS at 05:24

## 2021-04-24 RX ADMIN — Medication 975 MILLIGRAM(S): at 05:23

## 2021-04-24 RX ADMIN — Medication 975 MILLIGRAM(S): at 17:28

## 2021-04-24 RX ADMIN — SENNA PLUS 2 TABLET(S): 8.6 TABLET ORAL at 22:51

## 2021-04-24 RX ADMIN — Medication 975 MILLIGRAM(S): at 11:00

## 2021-04-24 RX ADMIN — OXYCODONE HYDROCHLORIDE 5 MILLIGRAM(S): 5 TABLET ORAL at 23:30

## 2021-04-24 RX ADMIN — Medication 975 MILLIGRAM(S): at 10:15

## 2021-04-24 RX ADMIN — TAMSULOSIN HYDROCHLORIDE 0.4 MILLIGRAM(S): 0.4 CAPSULE ORAL at 22:51

## 2021-04-24 RX ADMIN — ATORVASTATIN CALCIUM 40 MILLIGRAM(S): 80 TABLET, FILM COATED ORAL at 22:58

## 2021-04-24 NOTE — CHART NOTE - NSCHARTNOTEFT_GEN_A_CORE
patient with continued downtrending of Hgb.   Remains asymptomatic at this time  Vitals are stable  ALEXY output remains serosang  Order CTA of Abd/pelvis to r/o bleed

## 2021-04-25 VITALS
OXYGEN SATURATION: 93 % | TEMPERATURE: 98 F | RESPIRATION RATE: 18 BRPM | SYSTOLIC BLOOD PRESSURE: 119 MMHG | DIASTOLIC BLOOD PRESSURE: 76 MMHG | HEART RATE: 90 BPM

## 2021-04-25 LAB
BASOPHILS # BLD AUTO: 0.02 K/UL — SIGNIFICANT CHANGE UP (ref 0–0.2)
BASOPHILS # BLD AUTO: 0.03 K/UL — SIGNIFICANT CHANGE UP (ref 0–0.2)
BASOPHILS NFR BLD AUTO: 0.4 % — SIGNIFICANT CHANGE UP (ref 0–2)
BASOPHILS NFR BLD AUTO: 0.4 % — SIGNIFICANT CHANGE UP (ref 0–2)
EOSINOPHIL # BLD AUTO: 0.12 K/UL — SIGNIFICANT CHANGE UP (ref 0–0.5)
EOSINOPHIL # BLD AUTO: 0.13 K/UL — SIGNIFICANT CHANGE UP (ref 0–0.5)
EOSINOPHIL NFR BLD AUTO: 1.4 % — SIGNIFICANT CHANGE UP (ref 0–6)
EOSINOPHIL NFR BLD AUTO: 2.3 % — SIGNIFICANT CHANGE UP (ref 0–6)
HCT VFR BLD CALC: 27.9 % — LOW (ref 39–50)
HCT VFR BLD CALC: 32.1 % — LOW (ref 39–50)
HGB BLD-MCNC: 10 G/DL — LOW (ref 13–17)
HGB BLD-MCNC: 8.9 G/DL — LOW (ref 13–17)
IMM GRANULOCYTES NFR BLD AUTO: 0.4 % — SIGNIFICANT CHANGE UP (ref 0–1.5)
IMM GRANULOCYTES NFR BLD AUTO: 0.5 % — SIGNIFICANT CHANGE UP (ref 0–1.5)
LYMPHOCYTES # BLD AUTO: 1.2 K/UL — SIGNIFICANT CHANGE UP (ref 1–3.3)
LYMPHOCYTES # BLD AUTO: 1.32 K/UL — SIGNIFICANT CHANGE UP (ref 1–3.3)
LYMPHOCYTES # BLD AUTO: 15.9 % — SIGNIFICANT CHANGE UP (ref 13–44)
LYMPHOCYTES # BLD AUTO: 21.3 % — SIGNIFICANT CHANGE UP (ref 13–44)
MCHC RBC-ENTMCNC: 26.5 PG — LOW (ref 27–34)
MCHC RBC-ENTMCNC: 26.7 PG — LOW (ref 27–34)
MCHC RBC-ENTMCNC: 31.2 GM/DL — LOW (ref 32–36)
MCHC RBC-ENTMCNC: 31.9 GM/DL — LOW (ref 32–36)
MCV RBC AUTO: 83.8 FL — SIGNIFICANT CHANGE UP (ref 80–100)
MCV RBC AUTO: 85.1 FL — SIGNIFICANT CHANGE UP (ref 80–100)
MONOCYTES # BLD AUTO: 0.5 K/UL — SIGNIFICANT CHANGE UP (ref 0–0.9)
MONOCYTES # BLD AUTO: 0.57 K/UL — SIGNIFICANT CHANGE UP (ref 0–0.9)
MONOCYTES NFR BLD AUTO: 6.9 % — SIGNIFICANT CHANGE UP (ref 2–14)
MONOCYTES NFR BLD AUTO: 8.9 % — SIGNIFICANT CHANGE UP (ref 2–14)
NEUTROPHILS # BLD AUTO: 3.76 K/UL — SIGNIFICANT CHANGE UP (ref 1.8–7.4)
NEUTROPHILS # BLD AUTO: 6.24 K/UL — SIGNIFICANT CHANGE UP (ref 1.8–7.4)
NEUTROPHILS NFR BLD AUTO: 66.7 % — SIGNIFICANT CHANGE UP (ref 43–77)
NEUTROPHILS NFR BLD AUTO: 74.9 % — SIGNIFICANT CHANGE UP (ref 43–77)
PLATELET # BLD AUTO: 229 K/UL — SIGNIFICANT CHANGE UP (ref 150–400)
RBC # BLD: 3.33 M/UL — LOW (ref 4.2–5.8)
RBC # BLD: 3.77 M/UL — LOW (ref 4.2–5.8)
RBC # FLD: 16 % — HIGH (ref 10.3–14.5)
RBC # FLD: 16.1 % — HIGH (ref 10.3–14.5)
WBC # BLD: 5.63 K/UL — SIGNIFICANT CHANGE UP (ref 3.8–10.5)
WBC # BLD: 8.32 K/UL — SIGNIFICANT CHANGE UP (ref 3.8–10.5)
WBC # FLD AUTO: 5.63 K/UL — SIGNIFICANT CHANGE UP (ref 3.8–10.5)
WBC # FLD AUTO: 8.32 K/UL — SIGNIFICANT CHANGE UP (ref 3.8–10.5)

## 2021-04-25 PROCEDURE — 80048 BASIC METABOLIC PNL TOTAL CA: CPT

## 2021-04-25 PROCEDURE — 83735 ASSAY OF MAGNESIUM: CPT

## 2021-04-25 PROCEDURE — 97116 GAIT TRAINING THERAPY: CPT

## 2021-04-25 PROCEDURE — 85025 COMPLETE CBC W/AUTO DIFF WBC: CPT

## 2021-04-25 PROCEDURE — 97110 THERAPEUTIC EXERCISES: CPT

## 2021-04-25 PROCEDURE — 82435 ASSAY OF BLOOD CHLORIDE: CPT

## 2021-04-25 PROCEDURE — 86769 SARS-COV-2 COVID-19 ANTIBODY: CPT

## 2021-04-25 PROCEDURE — 80076 HEPATIC FUNCTION PANEL: CPT

## 2021-04-25 PROCEDURE — 74174 CTA ABD&PLVS W/CONTRAST: CPT | Mod: 26

## 2021-04-25 PROCEDURE — 85014 HEMATOCRIT: CPT

## 2021-04-25 PROCEDURE — 84100 ASSAY OF PHOSPHORUS: CPT

## 2021-04-25 PROCEDURE — 36415 COLL VENOUS BLD VENIPUNCTURE: CPT

## 2021-04-25 PROCEDURE — 85027 COMPLETE CBC AUTOMATED: CPT

## 2021-04-25 PROCEDURE — 86900 BLOOD TYPING SEROLOGIC ABO: CPT

## 2021-04-25 PROCEDURE — 84295 ASSAY OF SERUM SODIUM: CPT

## 2021-04-25 PROCEDURE — 82947 ASSAY GLUCOSE BLOOD QUANT: CPT

## 2021-04-25 PROCEDURE — 82803 BLOOD GASES ANY COMBINATION: CPT

## 2021-04-25 PROCEDURE — 85018 HEMOGLOBIN: CPT

## 2021-04-25 PROCEDURE — 86901 BLOOD TYPING SEROLOGIC RH(D): CPT

## 2021-04-25 PROCEDURE — 88302 TISSUE EXAM BY PATHOLOGIST: CPT

## 2021-04-25 PROCEDURE — 74174 CTA ABD&PLVS W/CONTRAST: CPT

## 2021-04-25 PROCEDURE — C1781: CPT

## 2021-04-25 PROCEDURE — 86850 RBC ANTIBODY SCREEN: CPT

## 2021-04-25 PROCEDURE — 82330 ASSAY OF CALCIUM: CPT

## 2021-04-25 PROCEDURE — 83930 ASSAY OF BLOOD OSMOLALITY: CPT

## 2021-04-25 PROCEDURE — 83605 ASSAY OF LACTIC ACID: CPT

## 2021-04-25 PROCEDURE — 84132 ASSAY OF SERUM POTASSIUM: CPT

## 2021-04-25 RX ADMIN — HEPARIN SODIUM 5000 UNIT(S): 5000 INJECTION INTRAVENOUS; SUBCUTANEOUS at 06:33

## 2021-04-25 RX ADMIN — ZOLPIDEM TARTRATE 5 MILLIGRAM(S): 10 TABLET ORAL at 00:58

## 2021-04-25 RX ADMIN — OXYCODONE HYDROCHLORIDE 5 MILLIGRAM(S): 5 TABLET ORAL at 06:27

## 2021-04-25 RX ADMIN — CARVEDILOL PHOSPHATE 12.5 MILLIGRAM(S): 80 CAPSULE, EXTENDED RELEASE ORAL at 06:27

## 2021-04-25 RX ADMIN — Medication 975 MILLIGRAM(S): at 07:00

## 2021-04-25 RX ADMIN — Medication 975 MILLIGRAM(S): at 06:27

## 2021-04-25 NOTE — PROGRESS NOTE ADULT - ASSESSMENT
A/P: 69 y/o M s/p open ventral hernia repair with rectorectus mesh, with hospital course notable for ZANE (improving). Tolerating CLD and pain significantly improved.    -Continue CLD; advance as tolerated  -F/U AM labs; trend creatinine  -Monitor drain outputs  -Abdominal binder at all times  -Potential discharge once tolerating regular diet
70yoM s/p ventral hernia repair w/ mesh complicated by decreasing Hgb. Remains HDS.  - f/u CTA of abd/pelvis  - trend h/h  - continue diet  - continue abd binder at all times  - continue prevena  - VNS for ALEXY management at home  
69 y/o M s/p open ventral hernia repair with rectorectus mesh, with hospital course notable for ZANE. Tolerating CLD and pain significantly improved.    Plan:  Monitor diet tolerance, advance diet as tolerated   - Monitor renal function  - Monitor drain outputs  - Keep Abdominal binder at all times  
Patient is a 71yo M now s/p ventral hernia repair with retrorectus mesh placement and JPx2    Regular diet  Pain control  Monitor ALEXY output  OOB and ambulating   PT consult   DVT PPX.

## 2021-04-25 NOTE — PROGRESS NOTE ADULT - SUBJECTIVE AND OBJECTIVE BOX
HPI/OVERNIGHT EVENTS: NAEON. Patient feels improved overall from day prior and has no new complaints. Pain controlled. Tolerating clear liquid diet, voiding and passing flatus. Upper GI discomfort decreased. Ambulating extensively. No f/c/n/v, chest pain, SOB, urinary symptoms, hematochezia, melena.    MEDICATIONS  (STANDING):  acetaminophen   Tablet .. 975 milliGRAM(s) Oral every 6 hours  aspirin  chewable 81 milliGRAM(s) Oral daily  atorvastatin Oral Tab/Cap - Peds 40 milliGRAM(s) Oral daily  carvedilol Oral Tab/Cap - Peds 12.5 milliGRAM(s) Oral two times a day  ceFAZolin   IVPB 2000 milliGRAM(s) IV Intermittent once  chlorhexidine 4% Liquid 1 Application(s) Topical <User Schedule>  heparin   Injectable 5000 Unit(s) SubCutaneous every 8 hours  senna 2 Tablet(s) Oral at bedtime  sodium chloride 0.9%. 1000 milliLiter(s) (75 mL/Hr) IV Continuous <Continuous>  tamsulosin 0.4 milliGRAM(s) Oral at bedtime    MEDICATIONS  (PRN):  aluminum hydroxide/magnesium hydroxide/simethicone Suspension 30 milliLiter(s) Oral every 4 hours PRN Dyspepsia  methocarbamol 500 milliGRAM(s) Oral two times a day PRN Muscle Spasm  morphine  - Injectable 2 milliGRAM(s) IV Push every 4 hours PRN Severe Pain (7 - 10)  oxyCODONE    IR 5 milliGRAM(s) Oral every 4 hours PRN Severe Pain (7 - 10)  simethicone 80 milliGRAM(s) Chew four times a day PRN Gas  traMADol 50 milliGRAM(s) Oral every 6 hours PRN Moderate Pain (4 - 6)  zolpidem 5 milliGRAM(s) Oral at bedtime PRN Insomnia      Vital Signs Last 24 Hrs  T(C): 36.7 (22 Apr 2021 19:38), Max: 36.8 (22 Apr 2021 16:17)  T(F): 98.1 (22 Apr 2021 19:38), Max: 98.3 (22 Apr 2021 16:17)  HR: 97 (22 Apr 2021 19:38) (95 - 101)  BP: 103/67 (22 Apr 2021 19:38) (94/61 - 119/72)  BP(mean): --  RR: 17 (22 Apr 2021 19:38) (16 - 18)  SpO2: 93% (22 Apr 2021 19:38) (92% - 93%)    Gen: patient laying in bed, A&Ox3, NAD  HEENT: EOMI / PERRL b/l  Pulm: regular respiratory rate, no distress  CV: RRR  GI: Prevena vac in place. ALEXY drains in place draining serosanguinous fluid. Abdomen soft, nondistended, minimal TTP without rebound or guarding  Neurological: no gross sensory / motor deficits  Ext: Warm, pink, no edema or lesions noted      I&O's Detail    21 Apr 2021 07:01  -  22 Apr 2021 07:00  --------------------------------------------------------  IN:  Total IN: 0 mL    OUT:    Bulb (mL): 60 mL    Bulb (mL): 30 mL    Voided (mL): 910 mL  Total OUT: 1000 mL    Total NET: -1000 mL      22 Apr 2021 07:01  -  23 Apr 2021 00:20  --------------------------------------------------------  IN:  Total IN: 0 mL    OUT:    Bulb (mL): 75 mL    Bulb (mL): 75 mL    Voided (mL): 350 mL  Total OUT: 500 mL    Total NET: -500 mL          LABS:                        12.9   14.48 )-----------( 284      ( 22 Apr 2021 07:23 )             40.6     04-22    139  |  115<H>  |  36.0<H>  ----------------------------<  89  3.6   |  13.0<L>  |  0.96    Ca    5.6<LL>      22 Apr 2021 13:34  Mg     2.1     04-22    TPro  4.6<L>  /  Alb  2.6<L>  /  TBili  <0.2<L>  /  DBili  0.1  /  AST  11  /  ALT  10  /  AlkPhos  74  04-22        
Subjective/Overnight event: Evaluated at bedside found laying down in no distress AM. No overnight events. Patient reports that pain is under control with no new complaints or symptoms. Tolerating diet, having regular bowel function. Urine output adequate. Denies any fever/chills, nausea/vomiting, dizziness, SOB chest pain, constipation/diarrhea, weakness, numbness.       MEDICATIONS  (STANDING):  acetaminophen   Tablet .. 975 milliGRAM(s) Oral every 6 hours  atorvastatin Oral Tab/Cap - Peds 40 milliGRAM(s) Oral daily  carvedilol Oral Tab/Cap - Peds 12.5 milliGRAM(s) Oral two times a day  ceFAZolin   IVPB 2000 milliGRAM(s) IV Intermittent once  chlorhexidine 4% Liquid 1 Application(s) Topical <User Schedule>  heparin   Injectable 5000 Unit(s) SubCutaneous every 8 hours  senna 2 Tablet(s) Oral at bedtime  tamsulosin 0.4 milliGRAM(s) Oral at bedtime    MEDICATIONS  (PRN):  aluminum hydroxide/magnesium hydroxide/simethicone Suspension 30 milliLiter(s) Oral every 4 hours PRN Dyspepsia  methocarbamol 500 milliGRAM(s) Oral two times a day PRN Muscle Spasm  morphine  - Injectable 2 milliGRAM(s) IV Push every 4 hours PRN Severe Pain (7 - 10)  oxyCODONE    IR 5 milliGRAM(s) Oral every 4 hours PRN Severe Pain (7 - 10)  simethicone 80 milliGRAM(s) Chew four times a day PRN Gas  traMADol 50 milliGRAM(s) Oral every 6 hours PRN Moderate Pain (4 - 6)  zolpidem 5 milliGRAM(s) Oral at bedtime PRN Insomnia      Vital Signs:  Vital Signs Last 24 Hrs  T(C): 36.9 (24 Apr 2021 04:54), Max: 36.9 (23 Apr 2021 23:25)  T(F): 98.5 (24 Apr 2021 04:54), Max: 98.5 (24 Apr 2021 04:54)  HR: 100 (24 Apr 2021 04:54) (80 - 105)  BP: 102/65 (24 Apr 2021 04:54) (102/65 - 127/76)  BP(mean): --  RR: 18 (24 Apr 2021 04:54) (16 - 19)  SpO2: 94% (24 Apr 2021 04:54) (91% - 94%)  I&O's Detail    22 Apr 2021 07:01  -  23 Apr 2021 07:00  --------------------------------------------------------  IN:    sodium chloride 0.9%: 975 mL  Total IN: 975 mL    OUT:    Bulb (mL): 135 mL    Bulb (mL): 95 mL    Voided (mL): 550 mL  Total OUT: 780 mL    Total NET: 195 mL      23 Apr 2021 07:01  -  24 Apr 2021 06:57  --------------------------------------------------------  IN:  Total IN: 0 mL    OUT:    Bulb (mL): 40 mL    Bulb (mL): 160 mL    Voided (mL): 400 mL  Total OUT: 600 mL    Total NET: -600 mL    Physical Examination:  General: alert, oriented x 3 in no acute distress   CV: normal S1/S2, RRR, no gallops, murmurs or rubs   Lungs: clear to auscultation b/l, symmetric chest movement, no rales, rhonchi or wheezing   Abdomen: Prevena vac in place. ALEXY drains in place draining serosanguinous fluid. soft, non-tender, non-distended, +BS  EXT: sensation intact, full ROM     Labs:    04-23    134<L>  |  100  |  32.0<H>  ----------------------------<  127<H>  4.6   |  21.0<L>  |  1.30    Ca    8.4<L>      23 Apr 2021 11:41  Phos  2.6     04-23  Mg     2.5     04-23    TPro  4.6<L>  /  Alb  2.6<L>  /  TBili  <0.2<L>  /  DBili  0.1  /  AST  11  /  ALT  10  /  AlkPhos  74  04-22    LIVER FUNCTIONS - ( 22 Apr 2021 13:34 )  Alb: 2.6 g/dL / Pro: 4.6 g/dL / ALK PHOS: 74 U/L / ALT: 10 U/L / AST: 11 U/L / GGT: x                                 10.6   10.23 )-----------( 219      ( 23 Apr 2021 11:41 )             33.8                               
HPI/OVERNIGHT EVENTS:  No acute events overnight. Pt stayed due to persistent decrease in hgb. Otherwise has no complaints. Pain is well controlled. Tolerating a diet. Remains afebrile, and HDS. JP1 and JP2 both with serosang output. Denies f/c/n/v/cp/sob.    MEDICATIONS  (STANDING):  acetaminophen   Tablet .. 975 milliGRAM(s) Oral every 6 hours  atorvastatin Oral Tab/Cap - Peds 40 milliGRAM(s) Oral daily  carvedilol Oral Tab/Cap - Peds 12.5 milliGRAM(s) Oral two times a day  ceFAZolin   IVPB 2000 milliGRAM(s) IV Intermittent once  chlorhexidine 4% Liquid 1 Application(s) Topical <User Schedule>  heparin   Injectable 5000 Unit(s) SubCutaneous every 8 hours  senna 2 Tablet(s) Oral at bedtime  tamsulosin 0.4 milliGRAM(s) Oral at bedtime    MEDICATIONS  (PRN):  aluminum hydroxide/magnesium hydroxide/simethicone Suspension 30 milliLiter(s) Oral every 4 hours PRN Dyspepsia  methocarbamol 500 milliGRAM(s) Oral two times a day PRN Muscle Spasm  morphine  - Injectable 2 milliGRAM(s) IV Push every 4 hours PRN Severe Pain (7 - 10)  oxyCODONE    IR 5 milliGRAM(s) Oral every 4 hours PRN Severe Pain (7 - 10)  simethicone 80 milliGRAM(s) Chew four times a day PRN Gas  traMADol 50 milliGRAM(s) Oral every 6 hours PRN Moderate Pain (4 - 6)  zolpidem 5 milliGRAM(s) Oral at bedtime PRN Insomnia      Vital Signs Last 24 Hrs  T(C): 36.8 (24 Apr 2021 19:42), Max: 36.9 (24 Apr 2021 04:54)  T(F): 98.3 (24 Apr 2021 19:42), Max: 98.5 (24 Apr 2021 04:54)  HR: 83 (24 Apr 2021 19:42) (83 - 100)  BP: 116/70 (24 Apr 2021 19:42) (102/65 - 120/78)  BP(mean): --  RR: 18 (24 Apr 2021 19:42) (18 - 19)  SpO2: 94% (24 Apr 2021 19:42) (93% - 94%)    gen: nad, a&ox3  cv: rrr  resp: nonlabored breathing  gi: soft, nd, nttp. Prevena in place. Both ALEXY's with serosang output.  msk: no c/c/e      I&O's Detail    23 Apr 2021 07:01  -  24 Apr 2021 07:00  --------------------------------------------------------  IN:  Total IN: 0 mL    OUT:    Bulb (mL): 40 mL    Bulb (mL): 160 mL    Voided (mL): 400 mL  Total OUT: 600 mL    Total NET: -600 mL      24 Apr 2021 07:01  -  25 Apr 2021 00:05  --------------------------------------------------------  IN:    Oral Fluid: 700 mL  Total IN: 700 mL    OUT:    Bulb (mL): 50 mL    Bulb (mL): 15 mL  Total OUT: 65 mL    Total NET: 635 mL          LABS:                        9.0    7.06  )-----------( 197      ( 24 Apr 2021 15:03 )             28.6     04-24    136  |  101  |  19.0  ----------------------------<  99  4.2   |  21.0<L>  |  1.08    Ca    8.4<L>      24 Apr 2021 08:06  Phos  2.7     04-24  Mg     2.0     04-24          
INTERVAL HPI/OVERNIGHT EVENTS:      Patient seen and evaluated at bedside and found hemodynamically stable and in no acute distress. No acute events overnight. Pain is well controlled, tolerating diet, without nausea or emesis. Drains with serosanguinous output, stripped. Denies fevers, chills, chest pain, SOB, coughing, dizziness, n/v/d, or generalized malaise.       SUBJECTIVE:      MEDICATIONS  (STANDING):  acetaminophen   Tablet .. 975 milliGRAM(s) Oral every 6 hours  aspirin  chewable 81 milliGRAM(s) Oral daily  atorvastatin Oral Tab/Cap - Peds 40 milliGRAM(s) Oral daily  carvedilol Oral Tab/Cap - Peds 12.5 milliGRAM(s) Oral two times a day  ceFAZolin   IVPB 2000 milliGRAM(s) IV Intermittent once  chlorhexidine 4% Liquid 1 Application(s) Topical <User Schedule>  enoxaparin Injectable 40 milliGRAM(s) SubCutaneous at bedtime  senna 2 Tablet(s) Oral at bedtime  tamsulosin 0.4 milliGRAM(s) Oral at bedtime    MEDICATIONS  (PRN):  methocarbamol 500 milliGRAM(s) Oral two times a day PRN Muscle Spasm  morphine  - Injectable 2 milliGRAM(s) IV Push every 4 hours PRN Severe Pain (7 - 10)  oxyCODONE    IR 5 milliGRAM(s) Oral every 4 hours PRN Severe Pain (7 - 10)  traMADol 50 milliGRAM(s) Oral every 6 hours PRN Moderate Pain (4 - 6)  zolpidem 5 milliGRAM(s) Oral at bedtime PRN Insomnia      Vital Signs Last 24 Hrs  T(C): 36.8 (21 Apr 2021 23:17), Max: 37.1 (21 Apr 2021 18:24)  T(F): 98.3 (21 Apr 2021 23:17), Max: 98.8 (21 Apr 2021 18:24)  HR: 88 (21 Apr 2021 23:17) (72 - 90)  BP: 109/71 (21 Apr 2021 23:17) (85/65 - 118/76)  BP(mean): --  RR: 18 (21 Apr 2021 23:17) (12 - 18)  SpO2: 97% (21 Apr 2021 23:17) (92% - 100%)    PHYSICAL EXAM:  General: lying in bed in no acute distress  HEENT: Neck supple  Chest: non-labored breathing or conversational dyspnea   Abdomen: prevena with good seal, JPx2 with srosanguinous output, abdomen non-distended, soft and depressible, non-tender. No guarding or rebound, non-peritonitic  Ext: no edema or cyanosis        I&O's Detail    21 Apr 2021 07:01  -  22 Apr 2021 01:00  --------------------------------------------------------  IN:  Total IN: 0 mL    OUT:    Bulb (mL): 40 mL    Bulb (mL): 20 mL    Voided (mL): 350 mL  Total OUT: 410 mL    Total NET: -410 mL          LABS:                RADIOLOGY & ADDITIONAL STUDIES:    ASSESSMENT AND PLAN:

## 2021-04-28 NOTE — DISCHARGE NOTE NURSING/CASE MANAGEMENT/SOCIAL WORK - WILL THE PATIENT ACCEPT THE PFIZER COVID-19 VACCINE IF ELIGIBLE AND IT IS AVAILABLE?
You have been referred to orthopedics.  I have recommended Dr. Pulido.  Please call [phone] for an appointment if one isn't already made.    They will call your cell phone if they find a time on Monday to be seen.    Wear splint as much as possible.  Okay to remove splint for hygiene (washing hands, bathing, etc.)     If you have any Workers' Compensation questions or needs, please call:  Luiza RN in Workers' Compensation Case Management at [phone].     
No

## 2021-04-29 ENCOUNTER — APPOINTMENT (OUTPATIENT)
Dept: SURGERY | Facility: CLINIC | Age: 71
End: 2021-04-29
Payer: MEDICARE

## 2021-04-29 VITALS
OXYGEN SATURATION: 94 % | DIASTOLIC BLOOD PRESSURE: 72 MMHG | SYSTOLIC BLOOD PRESSURE: 112 MMHG | TEMPERATURE: 98.8 F | HEART RATE: 85 BPM | WEIGHT: 210 LBS | HEIGHT: 75 IN | BODY MASS INDEX: 26.11 KG/M2

## 2021-04-29 PROCEDURE — 99024 POSTOP FOLLOW-UP VISIT: CPT

## 2021-04-29 NOTE — HISTORY OF PRESENT ILLNESS
[de-identified] : Mr. GRANT is a 69 year old man s/p diagnostic laparoscopy converted to exploratory laparotomy, extensive lysis of adhesions, and small bowel resection with primary anastomosis on 7/29/20 and s/p open left inguinal hernia repair on 10/21/20, with incisional hernia now s/p open retrorectus incisional hernia repair with mesh and anterior component separation on 4/21/21 who returns today for postop visit.  Today, pt reports that he is doing well. Complains of "soreness" which is improving. Denies sharp pain.  Denies fever/chills. Tolerating diet. Normal bowel movements. Prevena wound VAC has been in place with good suction since surgery. AELXY drain outputs have decreased since discharge. Left drain with minimal output for past 3 days. Right drain output is ~30-50mL per day. \par \par Of note, pt had bilateral botox injection with IR on 3/15/2021.\par CT again reviewed - hernia defect 12cm between medial borders of each rectus muscle

## 2021-04-29 NOTE — ASSESSMENT
[FreeTextEntry1] : Mr. GRANT is a 69 year old man s/p diagnostic laparoscopy converted to exploratory laparotomy, extensive lysis of adhesions, and small bowel resection with primary anastomosis on 7/29/20 and s/p open left inguinal hernia repair on 10/21/20, with incisional hernia now s/p open retrorectus incisional hernia repair with mesh and anterior component separation on 4/21/21, doing well\par \par Left ALEXY drain removed. Prevena wound VAC removed

## 2021-04-29 NOTE — PLAN
[FreeTextEntry1] : Continue to monitor right ALEXY drain output, track daily output\par Continue abdominal binder\par Return to office on 5/3/21 for staple removal and possible ALEXY drain removal

## 2021-04-29 NOTE — PHYSICAL EXAM
[JVD] : no jugular venous distention  [No Rash or Lesion] : No rash or lesion [Alert] : alert [Oriented to Person] : oriented to person [Oriented to Place] : oriented to place [Oriented to Time] : oriented to time [Calm] : calm [de-identified] : No acute distress [de-identified] : No respiratory distress [de-identified] : Regular rate [de-identified] : soft, nontender. midline incision c/d/i. ALEXY drains with serosanguinous output. left ALEXY drain removed [de-identified] : normal range of motion

## 2021-05-03 ENCOUNTER — APPOINTMENT (OUTPATIENT)
Dept: SURGERY | Facility: CLINIC | Age: 71
End: 2021-05-03
Payer: MEDICARE

## 2021-05-03 VITALS
TEMPERATURE: 98.6 F | SYSTOLIC BLOOD PRESSURE: 107 MMHG | OXYGEN SATURATION: 94 % | BODY MASS INDEX: 26.11 KG/M2 | HEART RATE: 72 BPM | DIASTOLIC BLOOD PRESSURE: 70 MMHG | HEIGHT: 75 IN | WEIGHT: 210 LBS

## 2021-05-03 PROCEDURE — 99024 POSTOP FOLLOW-UP VISIT: CPT

## 2021-05-03 NOTE — ASSESSMENT
[FreeTextEntry1] : Mr. GRANT is a 69 year old man s/p diagnostic laparoscopy converted to exploratory laparotomy, extensive lysis of adhesions, and small bowel resection with primary anastomosis on 7/29/20 and s/p open left inguinal hernia repair on 10/21/20, with incisional hernia now s/p open retrorectus incisional hernia repair with mesh and anterior component separation on 4/21/21, doing well\par \par Right ALEXY drain removed. Staples removed

## 2021-05-03 NOTE — HISTORY OF PRESENT ILLNESS
[de-identified] : Mr. GRANT is a 70 year old man s/p diagnostic laparoscopy converted to exploratory laparotomy, extensive lysis of adhesions, and small bowel resection with primary anastomosis on 7/29/20 and s/p open left inguinal hernia repair on 10/21/20, with incisional hernia now s/p open retrorectus incisional hernia repair with mesh and anterior component separation on 4/21/21, seen in office on 4/29/21 who returns today for followup. Right-sided ALEXY drain output has decreased to 5-10mL per day. Denies pain. Tolerating diet. Normal bowel movements.\par

## 2021-05-03 NOTE — PHYSICAL EXAM
[JVD] : no jugular venous distention  [No Rash or Lesion] : No rash or lesion [Alert] : alert [Oriented to Person] : oriented to person [Oriented to Place] : oriented to place [Oriented to Time] : oriented to time [Calm] : calm [de-identified] : No acute distress [de-identified] : No respiratory distress [de-identified] : Regular rate [de-identified] : soft, nontender. midline incision c/d/i. ALEXY drain with serosanguinous output. drain removed. staples removed [de-identified] : normal range of motion

## 2021-06-13 NOTE — ASU PATIENT PROFILE, ADULT - NS PRO PASSIVE SMOKE EXP
Patient: Anna Hamilton  FASCIOTOMY, LEFT THIGH  Anesthesia type: general    Patient location: PACU  Last vitals:   Vitals:    10/20/17 1450   BP: 130/67   Pulse: (!) 108   Resp: 12   Temp: 37.1  C (98.7  F)   SpO2: 97%     Post vital signs: stable  Level of consciousness: awake and responds to simple questions  Post-anesthesia pain: pain controlled  Post-anesthesia nausea and vomiting: no  Pulmonary: unassisted, return to baseline  Cardiovascular: stable and blood pressure at baseline  Hydration: adequate  Anesthetic events: no    QCDR Measures:  ASA# 11 - Jayashree-op Cardiac Arrest: ASA11B - Patient did NOT experience unanticipated cardiac arrest  ASA# 12 - Jayashree-op Mortality Rate: ASA12B - Patient did NOT die  ASA# 13 - PACU Re-Intubation Rate: ASA13B - Patient did NOT require a new airway mgmt  ASA# 10 - Composite Anes Safety: ASA10A - No serious adverse event    Additional Notes: will check HGB and notify floor of the check     No

## 2021-07-07 NOTE — ASU PREOP CHECKLIST - 1.
Daughter Calin Serrano was made aware of the discharge to [facility] and daughter will be here for patient's wheel chair. Wheel chair is at the nursing station with the . Patient is going to 14 A not 16A.     Pt refused to remove dentures

## 2021-08-30 NOTE — ED PROVIDER NOTE - NS ED MD DISPO SPECIAL CONSIDERATION1
Patient will be calling back to likely schedule hysteroscopy D&C.  Please have [facility] available just in case.
None

## 2021-12-07 NOTE — ED ADULT TRIAGE NOTE - PAIN RATING/NUMBER SCALE (0-10): REST
Sonal Cortez is a 12year old female who was brought in for this visit. History was provided by Mother    HPI:   Patient presents with:  Cough: Started 12/4   Ear Pain: pressure in ears + nasal congestion    Runny nose onset today.    Congested cough x : 53.5 kg (118 lb) (44 %, Z= -0.14)*    * Growth percentiles are based on CDC (Girls, 2-20 Years) data.     PHYSICAL EXAM:     Pulse 89   Temp 98.4 °F (36.9 °C) (Tympanic)   Wt 53.5 kg (118 lb)   SpO2 98%   BMI 21.39 kg/m²     Constitutional: Appears well-n visit:    Viral respiratory illness  -     SARS-COV-2 BY PCR (GATO); Future        Jossie Schaffer is a well hydrated appearing child with symptoms of a viral cold.  Due to the current pandemic, need for notation for school and risks to other family memb 4

## 2022-02-01 NOTE — ASU PREOP CHECKLIST - BOWEL PREP
Patient is doing well. She plans to follow up with her orthopedic physician for complaint of left knee pain and hip pain. She understands that should the buttocks to left knee persists or worsen we can obtain lumbar MRI for further evaluation of which she states presently she would like to hold as the pain has progressively improved since its onset in November. She also states that when she sleeps in her wrist splint her hand numbness is improved. Follow up will otherwise be on an as needed basis and she understands to contact the office should any symptoms worsen or arise.     
n/a

## 2022-12-20 ENCOUNTER — TRANSCRIPTION ENCOUNTER (OUTPATIENT)
Age: 72
End: 2022-12-20

## 2022-12-20 ENCOUNTER — INPATIENT (INPATIENT)
Facility: HOSPITAL | Age: 72
LOS: 0 days | Discharge: HOME | DRG: 247 | End: 2022-12-21
Attending: INTERNAL MEDICINE | Admitting: INTERNAL MEDICINE
Payer: MEDICARE

## 2022-12-20 VITALS
DIASTOLIC BLOOD PRESSURE: 72 MMHG | HEART RATE: 72 BPM | RESPIRATION RATE: 16 BRPM | SYSTOLIC BLOOD PRESSURE: 118 MMHG | OXYGEN SATURATION: 98 % | TEMPERATURE: 98 F

## 2022-12-20 DIAGNOSIS — Z95.810 PRESENCE OF AUTOMATIC (IMPLANTABLE) CARDIAC DEFIBRILLATOR: Chronic | ICD-10-CM

## 2022-12-20 DIAGNOSIS — K56.609 UNSPECIFIED INTESTINAL OBSTRUCTION, UNSPECIFIED AS TO PARTIAL VERSUS COMPLETE OBSTRUCTION: Chronic | ICD-10-CM

## 2022-12-20 DIAGNOSIS — R94.39 ABNORMAL RESULT OF OTHER CARDIOVASCULAR FUNCTION STUDY: ICD-10-CM

## 2022-12-20 DIAGNOSIS — Z98.890 OTHER SPECIFIED POSTPROCEDURAL STATES: Chronic | ICD-10-CM

## 2022-12-20 DIAGNOSIS — K46.9 UNSPECIFIED ABDOMINAL HERNIA WITHOUT OBSTRUCTION OR GANGRENE: Chronic | ICD-10-CM

## 2022-12-20 LAB
ALBUMIN SERPL ELPH-MCNC: 4 G/DL — SIGNIFICANT CHANGE UP (ref 3.3–5.2)
ALP SERPL-CCNC: 135 U/L — HIGH (ref 40–120)
ALT FLD-CCNC: 22 U/L — SIGNIFICANT CHANGE UP
ANION GAP SERPL CALC-SCNC: 7 MMOL/L — SIGNIFICANT CHANGE UP (ref 5–17)
AST SERPL-CCNC: 17 U/L — SIGNIFICANT CHANGE UP
BASOPHILS # BLD AUTO: 0.03 K/UL — SIGNIFICANT CHANGE UP (ref 0–0.2)
BASOPHILS NFR BLD AUTO: 0.4 % — SIGNIFICANT CHANGE UP (ref 0–2)
BILIRUB SERPL-MCNC: 0.3 MG/DL — LOW (ref 0.4–2)
BUN SERPL-MCNC: 17 MG/DL — SIGNIFICANT CHANGE UP (ref 8–20)
CALCIUM SERPL-MCNC: 9.2 MG/DL — SIGNIFICANT CHANGE UP (ref 8.4–10.5)
CHLORIDE SERPL-SCNC: 107 MMOL/L — SIGNIFICANT CHANGE UP (ref 96–108)
CO2 SERPL-SCNC: 24 MMOL/L — SIGNIFICANT CHANGE UP (ref 22–29)
CREAT SERPL-MCNC: 1.17 MG/DL — SIGNIFICANT CHANGE UP (ref 0.5–1.3)
EGFR: 66 ML/MIN/1.73M2 — SIGNIFICANT CHANGE UP
EOSINOPHIL # BLD AUTO: 0.21 K/UL — SIGNIFICANT CHANGE UP (ref 0–0.5)
EOSINOPHIL NFR BLD AUTO: 2.7 % — SIGNIFICANT CHANGE UP (ref 0–6)
GLUCOSE SERPL-MCNC: 98 MG/DL — SIGNIFICANT CHANGE UP (ref 70–99)
HCT VFR BLD CALC: 44.1 % — SIGNIFICANT CHANGE UP (ref 39–50)
HGB BLD-MCNC: 13.8 G/DL — SIGNIFICANT CHANGE UP (ref 13–17)
IMM GRANULOCYTES NFR BLD AUTO: 0.3 % — SIGNIFICANT CHANGE UP (ref 0–0.9)
LYMPHOCYTES # BLD AUTO: 1.69 K/UL — SIGNIFICANT CHANGE UP (ref 1–3.3)
LYMPHOCYTES # BLD AUTO: 22.1 % — SIGNIFICANT CHANGE UP (ref 13–44)
MAGNESIUM SERPL-MCNC: 1.9 MG/DL — SIGNIFICANT CHANGE UP (ref 1.8–2.6)
MCHC RBC-ENTMCNC: 26.6 PG — LOW (ref 27–34)
MCHC RBC-ENTMCNC: 31.3 GM/DL — LOW (ref 32–36)
MCV RBC AUTO: 85 FL — SIGNIFICANT CHANGE UP (ref 80–100)
MONOCYTES # BLD AUTO: 0.46 K/UL — SIGNIFICANT CHANGE UP (ref 0–0.9)
MONOCYTES NFR BLD AUTO: 6 % — SIGNIFICANT CHANGE UP (ref 2–14)
NEUTROPHILS # BLD AUTO: 5.25 K/UL — SIGNIFICANT CHANGE UP (ref 1.8–7.4)
NEUTROPHILS NFR BLD AUTO: 68.5 % — SIGNIFICANT CHANGE UP (ref 43–77)
PLATELET # BLD AUTO: 188 K/UL — SIGNIFICANT CHANGE UP (ref 150–400)
POTASSIUM SERPL-MCNC: 5.7 MMOL/L — HIGH (ref 3.5–5.3)
POTASSIUM SERPL-SCNC: 5.7 MMOL/L — HIGH (ref 3.5–5.3)
PROT SERPL-MCNC: 7.1 G/DL — SIGNIFICANT CHANGE UP (ref 6.6–8.7)
RBC # BLD: 5.19 M/UL — SIGNIFICANT CHANGE UP (ref 4.2–5.8)
RBC # FLD: 14.7 % — HIGH (ref 10.3–14.5)
SODIUM SERPL-SCNC: 138 MMOL/L — SIGNIFICANT CHANGE UP (ref 135–145)
WBC # BLD: 7.66 K/UL — SIGNIFICANT CHANGE UP (ref 3.8–10.5)
WBC # FLD AUTO: 7.66 K/UL — SIGNIFICANT CHANGE UP (ref 3.8–10.5)

## 2022-12-20 PROCEDURE — 93010 ELECTROCARDIOGRAM REPORT: CPT | Mod: 76

## 2022-12-20 RX ORDER — CLOPIDOGREL BISULFATE 75 MG/1
1 TABLET, FILM COATED ORAL
Qty: 90 | Refills: 3
Start: 2022-12-20 | End: 2023-12-14

## 2022-12-20 RX ORDER — TAMSULOSIN HYDROCHLORIDE 0.4 MG/1
0.4 CAPSULE ORAL
Refills: 0 | Status: DISCONTINUED | OUTPATIENT
Start: 2022-12-20 | End: 2022-12-21

## 2022-12-20 RX ORDER — ZOLPIDEM TARTRATE 10 MG/1
5 TABLET ORAL AT BEDTIME
Refills: 0 | Status: DISCONTINUED | OUTPATIENT
Start: 2022-12-20 | End: 2022-12-21

## 2022-12-20 RX ORDER — HYDROCORTISONE 20 MG
0 TABLET ORAL
Qty: 0 | Refills: 0 | DISCHARGE

## 2022-12-20 RX ORDER — ATORVASTATIN CALCIUM 80 MG/1
80 TABLET, FILM COATED ORAL AT BEDTIME
Refills: 0 | Status: DISCONTINUED | OUTPATIENT
Start: 2022-12-20 | End: 2022-12-20

## 2022-12-20 RX ORDER — ASPIRIN/CALCIUM CARB/MAGNESIUM 324 MG
81 TABLET ORAL DAILY
Refills: 0 | Status: DISCONTINUED | OUTPATIENT
Start: 2022-12-21 | End: 2022-12-21

## 2022-12-20 RX ORDER — TAMSULOSIN HYDROCHLORIDE 0.4 MG/1
0.4 CAPSULE ORAL AT BEDTIME
Refills: 0 | Status: DISCONTINUED | OUTPATIENT
Start: 2022-12-20 | End: 2022-12-20

## 2022-12-20 RX ORDER — SODIUM CHLORIDE 9 MG/ML
250 INJECTION INTRAMUSCULAR; INTRAVENOUS; SUBCUTANEOUS ONCE
Refills: 0 | Status: COMPLETED | OUTPATIENT
Start: 2022-12-20 | End: 2022-12-20

## 2022-12-20 RX ORDER — ATORVASTATIN CALCIUM 80 MG/1
80 TABLET, FILM COATED ORAL
Refills: 0 | Status: DISCONTINUED | OUTPATIENT
Start: 2022-12-20 | End: 2022-12-21

## 2022-12-20 RX ORDER — CLOPIDOGREL BISULFATE 75 MG/1
75 TABLET, FILM COATED ORAL DAILY
Refills: 0 | Status: DISCONTINUED | OUTPATIENT
Start: 2022-12-21 | End: 2022-12-21

## 2022-12-20 RX ORDER — ATORVASTATIN CALCIUM 80 MG/1
1 TABLET, FILM COATED ORAL
Qty: 0 | Refills: 0 | DISCHARGE

## 2022-12-20 RX ORDER — CARVEDILOL PHOSPHATE 80 MG/1
12.5 CAPSULE, EXTENDED RELEASE ORAL EVERY 12 HOURS
Refills: 0 | Status: DISCONTINUED | OUTPATIENT
Start: 2022-12-20 | End: 2022-12-21

## 2022-12-20 RX ORDER — CHLORHEXIDINE GLUCONATE 213 G/1000ML
1 SOLUTION TOPICAL ONCE
Refills: 0 | Status: DISCONTINUED | OUTPATIENT
Start: 2022-12-20 | End: 2022-12-21

## 2022-12-20 RX ORDER — ASPIRIN/CALCIUM CARB/MAGNESIUM 324 MG
1 TABLET ORAL
Qty: 90 | Refills: 0
Start: 2022-12-20 | End: 2023-03-19

## 2022-12-20 RX ORDER — HYDROCORTISONE 20 MG
10 TABLET ORAL DAILY
Refills: 0 | Status: DISCONTINUED | OUTPATIENT
Start: 2022-12-20 | End: 2022-12-21

## 2022-12-20 RX ORDER — ACETAMINOPHEN 500 MG
650 TABLET ORAL EVERY 6 HOURS
Refills: 0 | Status: DISCONTINUED | OUTPATIENT
Start: 2022-12-20 | End: 2022-12-21

## 2022-12-20 RX ADMIN — SODIUM CHLORIDE 62.5 MILLILITER(S): 9 INJECTION INTRAMUSCULAR; INTRAVENOUS; SUBCUTANEOUS at 17:53

## 2022-12-20 RX ADMIN — Medication 5 MILLIGRAM(S): at 21:34

## 2022-12-20 RX ADMIN — CARVEDILOL PHOSPHATE 12.5 MILLIGRAM(S): 80 CAPSULE, EXTENDED RELEASE ORAL at 21:34

## 2022-12-20 RX ADMIN — TAMSULOSIN HYDROCHLORIDE 0.4 MILLIGRAM(S): 0.4 CAPSULE ORAL at 16:03

## 2022-12-20 RX ADMIN — ZOLPIDEM TARTRATE 5 MILLIGRAM(S): 10 TABLET ORAL at 23:10

## 2022-12-20 RX ADMIN — SODIUM CHLORIDE 125 MILLILITER(S): 9 INJECTION INTRAMUSCULAR; INTRAVENOUS; SUBCUTANEOUS at 13:46

## 2022-12-20 RX ADMIN — ATORVASTATIN CALCIUM 80 MILLIGRAM(S): 80 TABLET, FILM COATED ORAL at 17:47

## 2022-12-20 NOTE — DISCHARGE NOTE PROVIDER - NSDCMRMEDTOKEN_GEN_ALL_CORE_FT
acetaminophen 325 mg oral tablet: 3 tab(s) orally every 6 hours, As Needed  aspirin 81 mg oral tablet, chewable: 1 tab(s) orally once a day  atorvastatin 80 mg oral tablet: 1 tab(s) orally once a day (at bedtime)  carvedilol 12.5 mg oral tablet: 1 tab(s) orally 2 times a day  chlorzoxazone 500 mg oral tablet: 1 tab(s) orally 2 times a day  clopidogrel 75 mg oral tablet: 1 tab(s) orally once a day  enalapril 10 mg oral tablet: 1  orally once a day  hydrocortisone 20 mg oral tablet: orally once a day, As Needed  magnesium oxide 400 mg (240 mg elemental magnesium) oral tablet: 1 tab(s) orally once a day  tamsulosin 0.4 mg oral capsule: 1 cap(s) orally once a day  zolpidem 10 mg oral tablet: 1 tab(s) orally once a day (at bedtime)   acetaminophen 325 mg oral tablet: 3 tab(s) orally every 6 hours, As Needed  aspirin 81 mg oral tablet, chewable: 1 tab(s) orally once a day  atorvastatin 80 mg oral tablet: 1 tab(s) orally once a day (at bedtime)  carvedilol 12.5 mg oral tablet: 1 tab(s) orally 2 times a day  chlorzoxazone 500 mg oral tablet: 1 tab(s) orally 2 times a day  clopidogrel 75 mg oral tablet: 1 tab(s) orally once a day  enalapril 5 mg oral tablet: 1 tab(s) orally every 12 hours  hydrocortisone 20 mg oral tablet: orally once a day, As Needed  magnesium oxide 400 mg (240 mg elemental magnesium) oral tablet: 1 tab(s) orally once a day  tamsulosin 0.4 mg oral capsule: 1 cap(s) orally once a day  zolpidem 10 mg oral tablet: 1 tab(s) orally once a day (at bedtime)

## 2022-12-20 NOTE — DISCHARGE NOTE PROVIDER - CARE PROVIDER_API CALL
Libertad Jha)  Cardiac Electrophysiology; Cardiovascular Disease; Internal Medicine  [address] [place] [address]  Phone: [phone]  Fax: [phone]  Established Patient  Follow Up Time: 1 week

## 2022-12-20 NOTE — DISCHARGE NOTE PROVIDER - NSDCFUADDINST_GEN_ALL_CORE_FT
Restricted use with no heavy lifting of affected arm for 48 hours.  No submerging the arm in water for 48 hours.  You may start showering today.  Call your doctor for any bleeding, swelling, loss of sensation in the hand or fingers, or fingers turning blue.  If heavy bleeding or large lumps form, hold pressure at the spot and come to the Emergency Room.   Restricted use with no heavy lifting of affected arm for 48 hours.  No submerging the arm in water for 48 hours.  You may start showering today.  Call your doctor for any bleeding, swelling, loss of sensation in the hand or fingers, or fingers turning blue.  If heavy bleeding or large lumps form, hold pressure at the spot and come to the Emergency Room.     DASH Diet (to lower high blood pressure):  - Try to eat foods rich in potassium, calcium, magnesium, fiber, and protein  - Limit salt (sodium) intake to less than 1,500 mg per day  - Eat vegetables, fruits, and whole grains  - Include fat-free or low-fat dairy products, fish, poultry, beans, nuts, and vegetable oils  - Limit foods that are high in saturated or trans fat, such as fatty meats, full-fat dairy products, and tropical oils such as coconut, palm kernel, and palm oils  - Limit sugar-sweetened beverages and sweets    For more information: https://www.nhlbi.nih.gov/education/dash-eating-plan

## 2022-12-20 NOTE — DISCHARGE NOTE NURSING/CASE MANAGEMENT/SOCIAL WORK - PATIENT PORTAL LINK FT
You can access the FollowMyHealth Patient Portal offered by [facility] by registering at the following website: [email or web address]. By joining [facility]’s FollowMyHealth portal, you will also be able to view your health information using other applications (apps) compatible with our system.

## 2022-12-20 NOTE — DISCHARGE NOTE NURSING/CASE MANAGEMENT/SOCIAL WORK - NSDCPEFALRISK_GEN_ALL_CORE
For information on Fall & Injury Prevention, visit: [email or web address] OR  [email or web address] OR  https://www.cdc.gov/steadi/patient.html

## 2022-12-20 NOTE — PROGRESS NOTE ADULT - SUBJECTIVE AND OBJECTIVE BOX
INTERVENTIONAL CARDIOLOGY POST PROCEDURE NOTE                                                                                            History obtained by: Patient and medical record  Community Cardiologist: Dr. Jha  Reason for Consultation: Evaluation for cardiac catheterization  Available pt records reviewed: Yes [ x ] No [  ]    HPI: 70M PMH of former smoker 26 pack years (quit 3 months ago), silent MI, CAD (no previous stents but [facility] 2012 revealing 50% RCA stenosis) NICM, HFrEF 35%, class II-III NYHA HF, chronic systolic heart failure, dilated cardiomyopathy, VTACH , dual chamber AICD placed 6/2013, HTN, HLD, BPH, lumbar disc herniations, chronic back pain on hydrocortisone and chlorzoxazone,  ex-lap found with lysis of adhesions and enterotomy with SBO 7/2020,  left inguinal hernia repair with mesh 10/2021, open repair of large ventral hernia 4/2021, Prostate ca s/p radical prostatectomy 2008, urinary incontinence reports recent firing of his ICD in October. Patient reports that he did not feel any shocks but his cardiologist, Dr. Jha told him that his device went off twice.  Recent TTE performed 11/11/22 revealing ED 35-40%, Grade 1 DD, no WMA, trace MVR, trace TR. Recent abnormal nuclear stress test 10/24/22 revealed moderate hypokinesis in the inferior and apical walls and mild hypokinesis of the septal wall.   Significant family history of MI ( Father nand Brother- both alive). Patient reports that he has more "fatigue" and tiredness than usual. He denies active chest pain, chest pressure, dyspnea, dyspnea on exertion, palpitations, dizziness and syncope. Patient also denies active COVID symptoms including fever, chills,  headache, dizziness, congestion, sneezing, runny nose, sore throat, loss of taste or smell, shortness of breath, or abdominal complaints including nausea, vomiting, diarrhea    Symptoms:        Anigina (Class): CCS III       Ischemic Symptoms:  LUO      Noninvasive Testing: Regadenoson Myocardial Perfusion SPECT  Stress Test: Date:  10/24/22       Protocol: Regadenoson       Symptoms: none       EKG Changes: no ST segment depression       Myocardial Imaging: small reversible defect in the anteroseptal and apical walls. Suggests artifact and attenuation. A small reversible defect in the inferior wall, mostly fixed. hypokinetic wall.        Risk Assessment (Low, Medium, High):     Echo (Date, Findings): 11/11/2022   EF 35% Grade 1 diastolic dysfunction.       Antianginal Therapies:        Beta Blockers:  coreg 12.5 PO BID       Calcium Channel Blockers: n/a       Long Acting Nitrates: n/a       Ranexa: n/a    Associated Risk Factors:        Cerebrovascular Disease: N/A       Chronic Lung Disease: N/A       Peripheral Arterial Disease: N/A       Chronic Kidney Disease (if yes, what is GFR): N/A       Uncontrolled Diabetes (if yes, what is HgbA1C or FBS): N/A       Poorly Controlled Hypertension (if yes, what is SBP): N/A       Morbid Obesity (if yes, what is BMI): N/A       History of Recent Ventricular Arrhythmia: N/A       Inability to Ambulate Safely: N/A       Need for Therapeutic Anticoagulation: N/A       Antiplatelet or Contrast Allergy: N/A      PAST MEDICAL HISTORY  HTN (hypertension)    Insomnia    Weak heart    Prostate CA    BPH (benign prostatic hyperplasia)    Hyperlipidemia    H/O Clostridium difficile infection    Post traumatic stress disorder (PTSD)    Chronic back pain    Incisional hernia        PAST SURGICAL HISTORY  Abdominal hernia    ICD (implantable cardioverter-defibrillator) in place    History of back surgery    S/P right inguinal herniorrhaphy    History of prostate surgery    SBO (small bowel obstruction)        SOCIAL HISTORY:  former smoker 26 pack years quit 3 months ago, no etoh use, no illicit drug use. lives at home with wife    FAMILY HISTORY:  FHx: myocardial infarction  father, 6 brother had MI    FH: diabetes mellitus  mother      Family History of Premature Cardiovascular Disease:  Yes [  ] No [  ]    HOME MEDICATIONS:  acetaminophen 325 mg oral tablet: 3 tab(s) orally every 6 hours (20 Dec 2022 11:03)  aspirin 81 mg oral tablet, chewable: 1 tab(s) orally once a day (20 Dec 2022 11:03)  atorvastatin 80 mg oral tablet: 1 tab(s) orally once a day (at bedtime) (20 Dec 2022 11:03)  carvedilol 12.5 mg oral tablet: 1 tab(s) orally 2 times a day (20 Dec 2022 11:03)  chlorzoxazone 500 mg oral tablet: 1 tab(s) orally 2 times a day (20 Dec 2022 11:03)  enalapril 10 mg oral tablet: 1  orally once a day (20 Dec 2022 11:03)  hydrocortisone 20 mg oral tablet: orally once a day, As Needed (20 Dec 2022 11:03)  magnesium oxide 400 mg (240 mg elemental magnesium) oral tablet: 1 tab(s) orally once a day (20 Dec 2022 11:03)  tamsulosin 0.4 mg oral capsule: 1 cap(s) orally once a day (20 Dec 2022 11:03)  zolpidem 10 mg oral tablet: 1 tab(s) orally once a day (at bedtime) (20 Dec 2022 11:03)      CURRENT CARDIAC MEDICATIONS:  carvedilol 12.5 milliGRAM(s) Oral every 12 hours  enalapril 10 milliGRAM(s) Oral daily      ALLERGIES:   cucumber (Anaphylaxis; Hives; Urticaria; Short breath)  No Known Drug Allergies      REVIEW OF SYMPTOMS:   CONSTITUTIONAL: no fever, no chills, no weight loss, no weight gain, no fatigue   CARDIOVASCULAR: no exertional dyspnea, no chest pain, no chest pressure, no palpitations, syncope  RESPIRATORY: no Shortness of breath, no cough, no wheezing  : No dysuria, no hematuria   GI: No dark color stool, no nausea, no diarrhea, no constipation, no abdominal pain   NEURO: No headache, no slurred speech   ALL OTHER REVIEW OF SYSTEMS ARE NEGATIVE.    VITAL SIGNS:  T(C): 36.6 (12-20-22 @ 10:50), Max: 36.6 (12-20-22 @ 10:50)  T(F): 97.9 (12-20-22 @ 10:50), Max: 97.9 (12-20-22 @ 10:50)  HR: 65 (12-20-22 @ 14:00) (65 - 72)  BP: 102/66 (12-20-22 @ 14:00) (102/66 - 118/72)  RR: 17 (12-20-22 @ 14:00) (16 - 17)  SpO2: 96% (12-20-22 @ 14:00) (96% - 98%)    INTAKE AND OUTPUT:       PHYSICAL EXAM:  Constitutional: Comfortable . No acute distress.   HEENT: Atraumatic and normocephalic , neck is supple . no JVD. No carotid bruit.  CNS: A&Ox3. No focal deficits.   Respiratory: CTAB, unlabored   Cardiovascular: RRR normal s1 s2. No murmur. No rubs or gallop.  Gastrointestinal: Soft, non-tender. +Bowel sounds.   Extremities: 2+ Peripheral Pulses, No clubbing, cyanosis, or edema  Psychiatric: Calm . no agitation.   Skin: Warm and dry, no ulcers on extremities   POST PROCEDURE SITE: RRA site with radial band in place. c/d/i. no active bleeding, no hematoma    LABS:                            13.8   7.66  )-----------( 188      ( 20 Dec 2022 11:00 )             44.1     12-20    138  |  107  |  17.0  ----------------------------<  98  5.7<H>   |  24.0  |  1.17    Ca    9.2      20 Dec 2022 11:38  Mg     1.9     12-20    TPro  7.1  /  Alb  4.0  /  TBili  0.3<L>  /  DBili  x   /  AST  17  /  ALT  22  /  AlkPhos  135<H>  12-20

## 2022-12-20 NOTE — PROGRESS NOTE ADULT - ASSESSMENT
Now s/p LHC via RRA with Dr. Edwards, tolerated procedure well. Pt arrived to recovery in NAD and HDS, RRA access site stable with radial band in place, no bleed/hematoma, distal pulse +, RUE/ Right hand warm to touch, acyanotic. motor/sensory function intact  Intraprocedurally: Lidocaine 1% 5ml; Midazolam 1mg; Fentanyl 50mcg; Verapamil 5mg IA; Heparin 4000 units IA; Heparin 6000 units IV; Nitroglycerin 100mcg IC; NS bolus 550 ml; Clopidogrel 600mg; Omnipaque 116ml  Findings: 80% RCA stenosis s/p 1 EVELYN (XIENCE SKYPOINT 4.5MM X 15MM)  (PRELIMINARY REPORT, PENDING OFFICIAL REPORT)  Post Cath EKG: NSR with age indeterminate inferior infarct    Plan:  -Formal cath report pending  -Post procedure management/monitoring per protocol  -Access site precautions  -Radial compression band removal at 1530  -Bedrest x 2 hours post procedure  -Labs and EKG in am  -NS 0.9% 250ml/hr x 1 bolus: post procedure CHRISTY ppx   -Repeat ECG if any clinical indication or change on tele  -Continue current medical therapy  -Dual anti platelet therapy with aspirin/plavix   -Cont BB with Carvedilol 12.5MG po BID  -Cont statin therapy with Lipitor 80mg po qHS   -Educated regarding strict adherence with DAPT   -Educated regarding post procedure management and care  -Discussed the importance of RF modification  -Cardiac rehab info provided/referral and communication to cardiac rehab completed  -F/U outpt in 1-2 weeks with Cardiologist Dr. Jha  -DISPO:  Plan for D/C in am if remains HDS, ECG and labs in am stable and without complications    1. CAD  GDMT:        DAPT: aspirin 81mg daily/ plavis 75mg daily       Statin: Atorvastatin 80mg QHS       Beta Blocker: Cont BB with Carvedilol 12.5MG po BID       Aggressive cardiovascular risk reduction and lifestyle modification.  Cardiac rehab info provided/referral and communication to cardiac rehab completed    2. HTN:  Beta Blocker: Cont BB with Carvedilol 12.5MG po BID  RAASi: Continue enalapril 10mg daily  Other:  DASH diet    3. HLD:  Goal Non-HDL < 100, LDL < 70  Statin: Continue atorvastatin 80mg QHS    4. HFrEF:  GDMT       Beta Blocker: Cont BB with Carvedilol 12.5MG po BID       RAAS Inhibitor: Continue enalapril 10mg daily       MRA: N/a       Diuretic: n/a       SGLT2i: n/a       Other:   Strict I&O's  Daily standing weights (if able)

## 2022-12-20 NOTE — DISCHARGE NOTE PROVIDER - NSDCCPCAREPLAN_GEN_ALL_CORE_FT
PRINCIPAL DISCHARGE DIAGNOSIS  Diagnosis: CAD (coronary artery disease)  Assessment and Plan of Treatment: Coronary artery disease is the buildup of plaque in the arteries that supply oxygen-rich blood to your heart. Plaque causes a narrowing or blockage that could result in a heart attack. Symptoms include chest pain or discomfort, shortness of breath, dizziness, palpitations, fatigue or reduced exercise tolerance. .  Go to the ED with any acute onset of chest pain, palpitations, shortness of breath or dizziness. Do NOT miss a dose or stop taking your Aspirin and Plavix, these medications keep your stent open and prevent a heart attack. If anyone tells you to stop these medications, speak to your cardiologist immediately.  Managing risk factors will help keep your stent open and prevent future blockages, risk factors may include: high blood pressure, high cholesterol, diabetes, obesity, sedentary life style and smoking.    Your diet should be low in fat, cholesterol, salt and carbohydrates, increase fruits (caution if diabetic), vegetables and whole grains/fiber rich foods.   Take all your cardiac  medications as prescribed.    Exercise is a very important factor in heart health. Once your post procedure restrictions have passed, you should engage in heart healthy, aerobic exercise. Be sure to have clearance from your cardiologist. Cardiac rehab programs could be extremely beneficial and your cardiologist could help set this up.   Follow up with your cardiologist within 1-2 weeks after your procedure.   Call your cardiologist or our unit ([phone]) with any questions or concerns that may arise.      SECONDARY DISCHARGE DIAGNOSES  Diagnosis: HTN (hypertension)  Assessment and Plan of Treatment: Beta Blocker: Cont BB with Carvedilol 12.5MG po BID  RAASi: Continue enalapril 10mg daily  Other:  DASH diet   DASH Diet (to lower high blood pressure):  - Try to eat foods rich in potassium, calcium, magnesium, fiber, and protein  - Limit salt (sodium) intake to less than 1,500 mg per day  - Eat vegetables, fruits, and whole grains  - Include fat-free or low-fat dairy products, fish, poultry, beans, nuts, and vegetable oils  - Limit foods that are high in saturated or trans fat, such as fatty meats, full-fat dairy products, and tropical oils such as coconut, palm kernel, and palm oils  - Limit sugar-sweetened beverages and sweets  For more information: https://www.nhlbi.nih.gov/education/dash-eating-plan    Diagnosis: HLD (hyperlipidemia)  Assessment and Plan of Treatment: Statin: Continue atorvastatin 80mg daily  -continue low salt, low cholesterol diet    Diagnosis: HFrEF (heart failure with reduced ejection fraction)  Assessment and Plan of Treatment: GDMT       Beta Blocker: Cont BB with Carvedilol 12.5MG po BID       RAAS Inhibitor: continue your enalapril 10mg daily  Return to nearest ED if you develop any chest pain, chest pressure, shortness of breath, palpitations, dizziness or leg swelling

## 2022-12-20 NOTE — DISCHARGE NOTE PROVIDER - NSDCCPTREATMENT_GEN_ALL_CORE_FT
PRINCIPAL PROCEDURE  Procedure: Left heart cardiac cath  Findings and Treatment: -You had a cardiac catheterization with  today on 12/20/22. Dr Edwards accessed your right radial artery during the procedure. That is the artery in your right wrist.   -Please continue to monitor your right wrist when you go home. If you develop any bleeding, lay down where you are and apply direct firm pressure until the bleeding stops. If the bleeding is excessive, please call 911.   -If heavy bleeding or large lumps form, hold pressure at the spot and come to the Emergency Room.  -No submerging the arm in water for 48 hours. This includes hot tubs, swimming pools and jacuzzis.  You may start showering tomorrow on 12/21. Prior to showering, remove dressing from right wrist. Shower with warm water and soap. Pat dry with towel. You may place a bandaid over the site. change the bandaid every day.   -Restricted use with no heavy lifting of affected arm for 48 hours. No heavy lifting greater than 10lbs.  -You may walk indoors/ outdoors as tolerated. No strenuous exercise, gym, sports or heavy lifting x5 days.  -Please return to nearest ED if you develop any fever, chills, drainage from the incision site, or any change of temperature, color, sensation of the affected extremity.  -Call your doctor for any bleeding, swelling, loss of sensation in the hand or fingers, or fingers turning blue.  -Please return to nearest ED if you develop any chest pain, chest pressure, shortness of breath, jaw pain, pain radiating down the arm, palpitations, dizziness, palpitations, abdominal complaints including abdominal pain, nausea and vomiting.   -Please follow up with your cardiologist in 1-2 weeks

## 2022-12-20 NOTE — DISCHARGE NOTE PROVIDER - HOSPITAL COURSE
History obtained by: Patient and medical record  Community Cardiologist: Dr. Jha  Reason for Consultation: Evaluation for cardiac catheterization  Available pt records reviewed: Yes [ x ] No [  ]    HPI: 70M PMH of former smoker 26 pack years (quit 3 months ago), silent MI, CAD (no previous stents but [facility] 2012 revealing 50% RCA stenosis) NICM, HFrEF 35%, class II-III NYHA HF, chronic systolic heart failure, dilated cardiomyopathy, VTACH , dual chamber AICD placed 6/2013, HTN, HLD, BPH, lumbar disc herniations, chronic back pain on hydrocortisone and chlorzoxazone,  ex-lap found with lysis of adhesions and enterotomy with SBO 7/2020,  left inguinal hernia repair with mesh 10/2021, open repair of large ventral hernia 4/2021, Prostate ca s/p radical prostatectomy 2008, urinary incontinence reports recent firing of his ICD in October. Patient reports that he did not feel any shocks but his cardiologist, Dr. Jha told him that his device went off twice.  Recent TTE performed 11/11/22 revealing ED 35-40%, Grade 1 DD, no WMA, trace MVR, trace TR. Recent abnormal nuclear stress test 10/24/22 revealed moderate hypokinesis in the inferior and apical walls and mild hypokinesis of the septal wall.   Significant family history of MI ( Father nand Brother- both alive). Patient reports that he has more "fatigue" and tiredness than usual. He denies active chest pain, chest pressure, dyspnea, dyspnea on exertion, palpitations, dizziness and syncope. Patient also denies active COVID symptoms including fever, chills,  headache, dizziness, congestion, sneezing, runny nose, sore throat, loss of taste or smell, shortness of breath, or abdominal complaints including nausea, vomiting, diarrhea    Symptoms:        Anigina (Class): CCS III       Ischemic Symptoms:  LUO      Noninvasive Testing: Regadenoson Myocardial Perfusion SPECT  Stress Test: Date:  10/24/22       Protocol: Regadenoson       Symptoms: none       EKG Changes: no ST segment depression       Myocardial Imaging: small reversible defect in the anteroseptal and apical walls. Suggests artifact and attenuation. A small reversible defect in the inferior wall, mostly fixed. hypokinetic wall.        Risk Assessment (Low, Medium, High):     Echo (Date, Findings): 11/11/2022   EF 35% Grade 1 diastolic dysfunction.       Antianginal Therapies:        Beta Blockers:  coreg 12.5 PO BID       Calcium Channel Blockers: n/a       Long Acting Nitrates: n/a       Ranexa: n/a    Associated Risk Factors:        Cerebrovascular Disease: N/A       Chronic Lung Disease: N/A       Peripheral Arterial Disease: N/A       Chronic Kidney Disease (if yes, what is GFR): N/A       Uncontrolled Diabetes (if yes, what is HgbA1C or FBS): N/A       Poorly Controlled Hypertension (if yes, what is SBP): N/A       Morbid Obesity (if yes, what is BMI): N/A       History of Recent Ventricular Arrhythmia: N/A       Inability to Ambulate Safely: N/A       Need for Therapeutic Anticoagulation: N/A       Antiplatelet or Contrast Allergy: N/A      Now s/p LHC via RRA with Dr. Edwards, tolerated procedure well. Pt arrived to recovery in NAD and HDS, RRA access site stable, no bleed/hematoma, distal pulse +, RUE/ Right hand warm to touch, acyanotic. motor/sensory function intact  Intraprocedurally: Lidocaine 1% 5ml; Midazolam 1mg; Fentanyl 50mcg; Verapamil 5mg IA; Heparin 4000 units IA; Heparin 6000 units IV; Nitroglycerin 100mcg IC; NS bolus 550 ml; Clopidogrel 600mg; Omnipaque 116ml  Findings: 80% RCA stenosis s/p 1 EVELYN (XIENCE SKYPOINT 4.5MM X 15MM)  (PRELIMINARY REPORT, PENDING OFFICIAL REPORT)  Post Cath EKG: NSR with age indeterminate inferior infarct    Plan:  -Formal cath report pending  -Post procedure management/monitoring per protocol  -Access site precautions  -NS 0.9% 250ml/hr x 1 bolus: post procedure CHRISTY ppx   -Repeat ECG if any clinical indication or change on tele  -Continue current medical therapy  -Dual anti platelet therapy with aspirin/plavix   -Cont BB with Carvedilol 12.5MG po BID  -Cont statin therapy with Lipitor 80mg po qHS   -Educated regarding strict adherence with DAPT   -Educated regarding post procedure management and care  -Discussed the importance of RF modification  -Cardiac rehab info provided/referral and communication to cardiac rehab completed  -F/U outpt in 1-2 weeks with Cardiologist Dr. Jha  -DISPO:  Plan for D/C in am if remains HDS, ECG and labs in am stable and without complications    1. CAD  GDMT:        DAPT: aspirin 81mg daily/ plavis 75mg daily       Statin: Atorvastatin 80mg QHS       Beta Blocker: Cont BB with Carvedilol 12.5MG po BID       Aggressive cardiovascular risk reduction and lifestyle modification.  Cardiac rehab info provided/referral and communication to cardiac rehab completed    2. HTN:  Beta Blocker: Cont BB with Carvedilol 12.5MG po BID  RAASi: Continue enalapril 10mg daily  Other:  DASH diet    3. HLD:  Goal Non-HDL < 100, LDL < 70  Statin: Continue atorvastatin 80mg QHS    4. HFrEF:  GDMT       Beta Blocker: Cont BB with Carvedilol 12.5MG po BID       RAAS Inhibitor: Continue enalapril 10mg daily       MRA: N/a       Diuretic: n/a       SGLT2i: n/a  Daily weights

## 2022-12-21 VITALS
OXYGEN SATURATION: 98 % | SYSTOLIC BLOOD PRESSURE: 112 MMHG | HEART RATE: 81 BPM | RESPIRATION RATE: 16 BRPM | DIASTOLIC BLOOD PRESSURE: 69 MMHG

## 2022-12-21 LAB
ALBUMIN SERPL ELPH-MCNC: 3.8 G/DL — SIGNIFICANT CHANGE UP (ref 3.3–5.2)
ALP SERPL-CCNC: 125 U/L — HIGH (ref 40–120)
ALT FLD-CCNC: 21 U/L — SIGNIFICANT CHANGE UP
ANION GAP SERPL CALC-SCNC: 10 MMOL/L — SIGNIFICANT CHANGE UP (ref 5–17)
AST SERPL-CCNC: 16 U/L — SIGNIFICANT CHANGE UP
BILIRUB SERPL-MCNC: 0.3 MG/DL — LOW (ref 0.4–2)
BUN SERPL-MCNC: 17 MG/DL — SIGNIFICANT CHANGE UP (ref 8–20)
CALCIUM SERPL-MCNC: 8.8 MG/DL — SIGNIFICANT CHANGE UP (ref 8.4–10.5)
CHLORIDE SERPL-SCNC: 106 MMOL/L — SIGNIFICANT CHANGE UP (ref 96–108)
CO2 SERPL-SCNC: 21 MMOL/L — LOW (ref 22–29)
CREAT SERPL-MCNC: 1.19 MG/DL — SIGNIFICANT CHANGE UP (ref 0.5–1.3)
EGFR: 65 ML/MIN/1.73M2 — SIGNIFICANT CHANGE UP
GLUCOSE SERPL-MCNC: 100 MG/DL — HIGH (ref 70–99)
HCT VFR BLD CALC: 40.8 % — SIGNIFICANT CHANGE UP (ref 39–50)
HGB BLD-MCNC: 12.9 G/DL — LOW (ref 13–17)
MAGNESIUM SERPL-MCNC: 2 MG/DL — SIGNIFICANT CHANGE UP (ref 1.6–2.6)
MCHC RBC-ENTMCNC: 26.8 PG — LOW (ref 27–34)
MCHC RBC-ENTMCNC: 31.6 GM/DL — LOW (ref 32–36)
MCV RBC AUTO: 84.8 FL — SIGNIFICANT CHANGE UP (ref 80–100)
PLATELET # BLD AUTO: 188 K/UL — SIGNIFICANT CHANGE UP (ref 150–400)
POTASSIUM SERPL-MCNC: 4.2 MMOL/L — SIGNIFICANT CHANGE UP (ref 3.5–5.3)
POTASSIUM SERPL-SCNC: 4.2 MMOL/L — SIGNIFICANT CHANGE UP (ref 3.5–5.3)
PROT SERPL-MCNC: 7.1 G/DL — SIGNIFICANT CHANGE UP (ref 6.6–8.7)
RBC # BLD: 4.81 M/UL — SIGNIFICANT CHANGE UP (ref 4.2–5.8)
RBC # FLD: 14.9 % — HIGH (ref 10.3–14.5)
SODIUM SERPL-SCNC: 137 MMOL/L — SIGNIFICANT CHANGE UP (ref 135–145)
WBC # BLD: 8.41 K/UL — SIGNIFICANT CHANGE UP (ref 3.8–10.5)
WBC # FLD AUTO: 8.41 K/UL — SIGNIFICANT CHANGE UP (ref 3.8–10.5)

## 2022-12-21 PROCEDURE — C1874: CPT

## 2022-12-21 PROCEDURE — C1725: CPT

## 2022-12-21 PROCEDURE — 85027 COMPLETE CBC AUTOMATED: CPT

## 2022-12-21 PROCEDURE — C9600: CPT | Mod: RC

## 2022-12-21 PROCEDURE — C1769: CPT

## 2022-12-21 PROCEDURE — 85025 COMPLETE CBC W/AUTO DIFF WBC: CPT

## 2022-12-21 PROCEDURE — 36415 COLL VENOUS BLD VENIPUNCTURE: CPT

## 2022-12-21 PROCEDURE — 93005 ELECTROCARDIOGRAM TRACING: CPT

## 2022-12-21 PROCEDURE — 83735 ASSAY OF MAGNESIUM: CPT

## 2022-12-21 PROCEDURE — 93010 ELECTROCARDIOGRAM REPORT: CPT

## 2022-12-21 PROCEDURE — 93458 L HRT ARTERY/VENTRICLE ANGIO: CPT | Mod: XU

## 2022-12-21 PROCEDURE — C1887: CPT

## 2022-12-21 PROCEDURE — 80053 COMPREHEN METABOLIC PANEL: CPT

## 2022-12-21 PROCEDURE — 92978 ENDOLUMINL IVUS OCT C 1ST: CPT | Mod: RC

## 2022-12-21 PROCEDURE — C1753: CPT

## 2022-12-21 PROCEDURE — C1894: CPT

## 2022-12-21 RX ADMIN — Medication 81 MILLIGRAM(S): at 08:43

## 2022-12-21 RX ADMIN — Medication 10 MILLIGRAM(S): at 05:30

## 2022-12-21 RX ADMIN — Medication 5 MILLIGRAM(S): at 08:43

## 2022-12-21 RX ADMIN — CARVEDILOL PHOSPHATE 12.5 MILLIGRAM(S): 80 CAPSULE, EXTENDED RELEASE ORAL at 08:43

## 2022-12-21 RX ADMIN — ZOLPIDEM TARTRATE 5 MILLIGRAM(S): 10 TABLET ORAL at 00:25

## 2022-12-21 RX ADMIN — CLOPIDOGREL BISULFATE 75 MILLIGRAM(S): 75 TABLET, FILM COATED ORAL at 08:43

## 2022-12-21 NOTE — PROGRESS NOTE ADULT - ASSESSMENT
Now POD 1 s/p LHC via RRA with proximal RCA 70% stenosis. IVUS was performed showing a 73% by area stenosis. 1 EVELYN (XIENCE SKYPOINT 4.5 x 15MM) was deployed into the proximal RCA with Dr. Edwards, tolerated procedure well with no complaints of chest pain, chest pressure, shortness of breath, palpitations, abdominal complaints including nausea, vomiting, diaphoresis, LUO, or wrist pain/ numbness/ tingling. Right wrist site c/d/i with no active bleeding, no hematoma. RUE/ right hand remains acyanotic, warm to touch, motor and sensory function intact.   Post Cath EKG: stable from pre procedure NSR with no active ischemia.     Plan:  -Post procedure management/monitoring per protocol  -Access site precautions  -Continue current medical therapy  -Dual anti platelet therapy with aspirin 81mg daily and plavix 75mg daily  -Cont BB with Carvedilol 12.5MG po BID  -Continue enalapril 5mg PO BID  -Cont statin therapy with Lipitor 80mg po qHS   -Educated regarding strict adherence with DAPT   -Educated regarding post procedure management and care  -Discussed the importance of RF modification  -Cardiac rehab info provided/referral and communication to cardiac rehab completed  -F/U outpt in 1-2 weeks with Cardiologist Dr. Jha  -DISPO:  Plan for D/C in am if remains HDS, ECG and labs in am stable and without complications    1. CAD  GDMT:        DAPT: aspirin 81mg daily/ plavis 75mg daily       Statin: Atorvastatin 80mg QHS       Beta Blocker: Cont BB with Carvedilol 12.5MG po BID       Aggressive cardiovascular risk reduction and lifestyle modification.  Cardiac rehab info provided/referral and communication to cardiac rehab completed  Coronary artery disease is the buildup of plaque in the arteries that supply oxygen-rich blood to your heart. Plaque causes a narrowing or blockage that could result in a heart attack. Symptoms include chest pain or discomfort, shortness of breath, dizziness, palpitations, fatigue or reduced exercise tolerance. .  Go to the ED with any acute onset of chest pain, palpitations, shortness of breath or dizziness. Do NOT miss a dose or stop taking your Aspirin and ***, these medications keep your stent open and prevent a heart attack. If anyone tells you to stop these medications, speak to your cardiologist immediately.  Managing risk factors will help keep your stent open and prevent future blockages, risk factors may include: high blood pressure, high cholesterol, diabetes, obesity, sedentary life style and smoking.    Your diet should be low in fat, cholesterol, salt and carbohydrates, increase fruits (caution if diabetic), vegetables and whole grains/fiber rich foods.   Take all your cardiac  medications as prescribed.    Exercise is a very important factor in heart health. Once your post procedure restrictions have passed, you should engage in heart healthy, aerobic exercise. Be sure to have clearance from your cardiologist. Cardiac rehab programs could be extremely beneficial and your cardiologist could help set this up.   Follow up with your cardiologist within 1-2 weeks after your procedure.   Call your cardiologist or our unit ([phone]) with any questions or concerns that may arise.     2. HTN:  Beta Blocker: Cont BB with Carvedilol 12.5MG po BID  RAASi: Continue enalapril 10mg daily  Other:  DASH diet   DASH Diet (to lower high blood pressure):  - Try to eat foods rich in potassium, calcium, magnesium, fiber, and protein  - Limit salt (sodium) intake to less than 1,500 mg per day  - Eat vegetables, fruits, and whole grains  - Include fat-free or low-fat dairy products, fish, poultry, beans, nuts, and vegetable oils  - Limit foods that are high in saturated or trans fat, such as fatty meats, full-fat dairy products, and tropical oils such as coconut, palm kernel, and palm oils  - Limit sugar-sweetened beverages and sweets    For more information: https://www.nhlbi.nih.gov/education/dash-eating-plan    3. HLD:  Goal Non-HDL < 100, LDL < 70  Statin: Continue atorvastatin 80mg QHS    4. HFrEF:  GDMT       Beta Blocker: Cont BB with Carvedilol 12.5MG po BID       RAAS Inhibitor: Continue enalapril 10mg daily       MRA: N/a       Diuretic: n/a       SGLT2i: n/a       Other:   Strict I&O's  Daily standing weights (if able)    DISPO: Discharge home today

## 2022-12-21 NOTE — PROGRESS NOTE ADULT - SUBJECTIVE AND OBJECTIVE BOX
INTERVENTIONAL CARDIOLOGY NP NOTE                                                                                            History obtained by: Patient and medical record  Community Cardiologist: Dr. Jha  Reason for Consultation: Evaluation for cardiac catheterization  Available pt records reviewed: Yes [ x ] No [  ]    Chief complaint:    Patient is a 72y old  Male who presents with a chief complaint of [facility] (20 Dec 2022 14:39)      HPI: 70M PMH of former smoker 26 pack years (quit 3 months ago), silent MI, CAD (no previous stents but [facility] 2012 revealing 50% RCA stenosis) NICM, HFrEF 35%, class II-III NYHA HF, chronic systolic heart failure, dilated cardiomyopathy, VTACH , dual chamber AICD placed 6/2013, HTN, HLD, BPH, lumbar disc herniations, chronic back pain on hydrocortisone and chlorzoxazone,  ex-lap found with lysis of adhesions and enterotomy with SBO 7/2020,  left inguinal hernia repair with mesh 10/2021, open repair of large ventral hernia 4/2021, Prostate ca s/p radical prostatectomy 2008, urinary incontinence reports recent firing of his ICD in October. Patient reports that he did not feel any shocks but his cardiologist, Dr. Jha told him that his device went off twice.  Recent TTE performed 11/11/22 revealing ED 35-40%, Grade 1 DD, no WMA, trace MVR, trace TR. Recent abnormal nuclear stress test 10/24/22 revealed moderate hypokinesis in the inferior and apical walls and mild hypokinesis of the septal wall.   Significant family history of MI ( Father nand Brother- both alive). Patient reports that he has more "fatigue" and tiredness than usual. He denies active chest pain, chest pressure, dyspnea, dyspnea on exertion, palpitations, dizziness and syncope. Patient also denies active COVID symptoms including fever, chills,  headache, dizziness, congestion, sneezing, runny nose, sore throat, loss of taste or smell, shortness of breath, or abdominal complaints including nausea, vomiting, diarrhea    Symptoms:        Anigina (Class): CCS III       Ischemic Symptoms:  LUO      Noninvasive Testing: Regadenoson Myocardial Perfusion SPECT  Stress Test: Date:  10/24/22       Protocol: Regadenoson       Symptoms: none       EKG Changes: no ST segment depression       Myocardial Imaging: small reversible defect in the anteroseptal and apical walls. Suggests artifact and attenuation. A small reversible defect in the inferior wall, mostly fixed. hypokinetic wall.        Risk Assessment (Low, Medium, High):     Echo (Date, Findings): 11/11/2022   EF 35% Grade 1 diastolic dysfunction.       Antianginal Therapies:        Beta Blockers:  coreg 12.5 PO BID       Calcium Channel Blockers: n/a       Long Acting Nitrates: n/a       Ranexa: n/a    Associated Risk Factors:        Cerebrovascular Disease: N/A       Chronic Lung Disease: N/A       Peripheral Arterial Disease: N/A       Chronic Kidney Disease (if yes, what is GFR): N/A       Uncontrolled Diabetes (if yes, what is HgbA1C or FBS): N/A       Poorly Controlled Hypertension (if yes, what is SBP): N/A       Morbid Obesity (if yes, what is BMI): N/A       History of Recent Ventricular Arrhythmia: N/A       Inability to Ambulate Safely: N/A       Need for Therapeutic Anticoagulation: N/A       Antiplatelet or Contrast Allergy: N/A      PAST MEDICAL HISTORY  HTN (hypertension)    Insomnia    Weak heart    Prostate CA    BPH (benign prostatic hyperplasia)    Hyperlipidemia    H/O Clostridium difficile infection    Post traumatic stress disorder (PTSD)    Chronic back pain    Incisional hernia        PAST SURGICAL HISTORY  Abdominal hernia    ICD (implantable cardioverter-defibrillator) in place    History of back surgery    S/P right inguinal herniorrhaphy    History of prostate surgery    SBO (small bowel obstruction)        FAMILY HISTORY:  FHx: myocardial infarction  father, 6 brother had MI    FH: diabetes mellitus  mother      Family History of Premature Cardiovascular Disease:  Yes [  ] No [  ]    HOME MEDICATIONS:  acetaminophen 325 mg oral tablet: 3 tab(s) orally every 6 hours, As Needed (20 Dec 2022 18:03)  atorvastatin 80 mg oral tablet: 1 tab(s) orally once a day (at bedtime) (20 Dec 2022 18:03)  carvedilol 12.5 mg oral tablet: 1 tab(s) orally 2 times a day (20 Dec 2022 11:03)  chlorzoxazone 500 mg oral tablet: 1 tab(s) orally 2 times a day (20 Dec 2022 11:03)  enalapril 5 mg oral tablet: 1 tab(s) orally every 12 hours (20 Dec 2022 18:03)  hydrocortisone 20 mg oral tablet: orally once a day, As Needed (20 Dec 2022 18:03)  magnesium oxide 400 mg (240 mg elemental magnesium) oral tablet: 1 tab(s) orally once a day (20 Dec 2022 11:03)  tamsulosin 0.4 mg oral capsule: 1 cap(s) orally once a day (20 Dec 2022 11:03)  zolpidem 10 mg oral tablet: 1 tab(s) orally once a day (at bedtime) (20 Dec 2022 11:03)      CURRENT CARDIAC MEDICATIONS:  carvedilol 12.5 milliGRAM(s) Oral every 12 hours  enalapril 5 milliGRAM(s) Oral every 12 hours      ALLERGIES:   [This allergen will not trigger allergy alert] cucumber (Anaphylaxis; Hives; Urticaria; Short breath)  [This allergen will not trigger allergy alert] cucumber extract (Swelling)      REVIEW OF SYMPTOMS:   CONSTITUTIONAL: no fever, no chills, no weight loss, no weight gain, no fatigue   CARDIOVASCULAR: no active chest pain, chest pressure, shortness of breath, palpitations, no leg edema  RESPIRATORY: no Shortness of breath, no cough, no wheezing  : No dysuria, no hematuria   GI: No dark color stool, no nausea, no diarrhea, no constipation, no abdominal pain, no vomiting  NEURO: No headache, no slurred speech   ALL OTHER REVIEW OF SYSTEMS ARE NEGATIVE.    VITAL SIGNS:  T(C): 36.4 (12-21-22 @ 05:28), Max: 36.6 (12-20-22 @ 10:50)  T(F): 97.5 (12-21-22 @ 05:28), Max: 97.9 (12-20-22 @ 10:50)  HR: 81 (12-21-22 @ 08:37) (65 - 81)  BP: 112/69 (12-21-22 @ 08:37) (97/62 - 151/68)  RR: 16 (12-21-22 @ 08:37) (15 - 17)  SpO2: 98% (12-21-22 @ 08:37) (96% - 98%)    INTAKE AND OUTPUT:     12-20 @ 07:01  -  12-21 @ 07:00  --------------------------------------------------------  IN: 240 mL / OUT: 800 mL / NET: -560 mL        PHYSICAL EXAM:  Constitutional: Comfortable . No acute distress.   HEENT: Atraumatic and normocephalic , neck is supple . no JVD. No carotid bruit.  CNS: A&Ox3. No focal deficits.   Respiratory: CTAB, unlabored   Cardiovascular: RRR normal s1 s2. No murmur. No rubs or gallop.  Gastrointestinal: Soft, non-tender. +Bowel sounds.   Extremities: 2+ Peripheral Pulses, No clubbing, cyanosis, or edema  Psychiatric: Calm . no agitation.   Skin: Warm and dry, no ulcers on extremities   POST PROCEDURE SITE: RRA SITE c/d/i with no active bleeding, no hematoma. 2+ radial pulse. RUE/ right hand remains warm and acyanotic    LABS:                            12.9   8.41  )-----------( 188      ( 21 Dec 2022 05:28 )             40.8     12-21    137  |  106  |  17.0  ----------------------------<  100<H>  4.2   |  21.0<L>  |  1.19    Ca    8.8      21 Dec 2022 05:28  Mg     2.0     12-21    TPro  7.1  /  Alb  3.8  /  TBili  0.3<L>  /  DBili  x   /  AST  16  /  ALT  21  /  AlkPhos  125<H>  12-21

## 2023-04-18 NOTE — ED PROVIDER NOTE - HIV OFFER
Per pt, increased falls x3 months. Pt states he does not feel more weak than normal and denies any lightheadedness. States he just feels like his balance is off. States 3 falls today. Denies hitting head or loc. -thinners.  
Opt out

## 2023-05-17 NOTE — H&P PST ADULT - NSDEVICEINTERDT_GEN_A_OR
TREATMENT PLAN (Medication Management Only)        [place]    Name and Date of Birth:  Zeny Shannon 15 y o  2008  Date of Treatment Plan: May 17, 2023  Diagnosis/Diagnoses:    1  Attention deficit hyperactivity disorder (ADHD), combined type    2  Oppositional defiant disorder      Strengths/Personal Resources for Self-Care: supportive family, taking medications as prescribed, ability to communicate needs, ability to listen  Area/Areas of need (in own words): ADHD symptoms  1  Long Term Goal: improve ADHD symptoms  Target Date: 1 year - 5/17/2024  Person/Persons responsible for completion of goal: ANKITA De La Rosa   2   Short Term Objective (s) - How will we reach this goal?:   A  Provider new recommended medication/dosage changes and/or continue medication(s): continue current medications as prescribed  B   Continue working on healthy diet and exercise  Target Date: 3 months - 8/17/2023  Person/Persons Responsible for Completion of Goal: ANKITA De La Rosa  Progress Towards Goals: continuing treatment  Treatment Modality: medication management every 3 months  Review due 6 months from date of this plan: 6 months - 11/17/2023  Expected length of service: maintenance unless revised  My Physician/PA/NP and I have developed this plan together and I agree to work on the goals and objectives  I understand the treatment goals that were developed for my treatment  14-Oct-2014

## 2024-05-13 RX ORDER — MAGNESIUM OXIDE 400 MG ORAL TABLET 241.3 MG
1 TABLET ORAL
Qty: 0 | Refills: 0 | DISCHARGE

## 2024-05-13 RX ORDER — ATORVASTATIN CALCIUM 80 MG/1
1 TABLET, FILM COATED ORAL
Qty: 0 | Refills: 0 | DISCHARGE

## 2024-05-13 RX ORDER — ZOLPIDEM TARTRATE 10 MG/1
1 TABLET ORAL
Qty: 0 | Refills: 0 | DISCHARGE

## 2024-05-13 RX ORDER — CHLORZOXAZONE 250 MG
1 TABLET ORAL
Qty: 0 | Refills: 0 | DISCHARGE

## 2024-05-13 RX ORDER — TAMSULOSIN HYDROCHLORIDE 0.4 MG/1
1 CAPSULE ORAL
Qty: 0 | Refills: 0 | DISCHARGE

## 2024-05-13 RX ORDER — HYDROCORTISONE 20 MG
0 TABLET ORAL
Qty: 0 | Refills: 0 | DISCHARGE

## 2024-05-13 RX ORDER — ATORVASTATIN CALCIUM 80 MG/1
1 TABLET, FILM COATED ORAL
Refills: 0 | DISCHARGE

## 2024-05-13 RX ORDER — CHLORHEXIDINE GLUCONATE 213 G/1000ML
1 SOLUTION TOPICAL ONCE
Refills: 0 | Status: DISCONTINUED | OUTPATIENT
Start: 2024-05-14 | End: 2024-05-28

## 2024-05-13 NOTE — H&P PST ADULT - ASSESSMENT
HPI: 72 yo male with LUO, recent VT seen on AICD interrogation, abnormal nuclear stress test to undergo left heart catheterization.        ASA  Mallampati  GFR  Creat  Bleeding Risk      Plan/Recommendations:   -plan for LHC on 5/14/24  -patient seen and examined  -ECG and Labs reviewed  -NPO after midnight prior with exception of sip of water with morning medications  -Continue meds HPI: This is a 73 year old with a recent abnormal NST to undergo LHC.     RISK FACTORS:  ASA: 3  Mallampati: 2  GFR: 81  Creat: 0.98  Bleeding Risk: 2.3      Plan/Recommendations:   -plan for LHC on 5/14/24  -patient seen and examined  -ASA 81mg po taken this AM  -NS 250cc bolus ordered   -ECG and Labs reviewed  -NPO after midnight prior with exception of sip of water with morning medications  -Continue meds

## 2024-05-13 NOTE — H&P PST ADULT - HISTORY OF PRESENT ILLNESS
Department of Cardiology                                                                  [facility]/[place] [address]                                                            Telephone: [phone]. Fax:[phone]                                                                                    [facility] H & P     Chief complaint:    Patient is a 73y old  Male who presents with a chief complaint of abnormal nuclear stress test presents for [facility]    HPI: 74 yo male with history of Coronary Artery Disease, AICD.  AICD interrogation revealed ventricular tachycardia which was pace terminated.  He had c/o of LUO and underwent nuclear stress test demonstrating apical ischemia and LVEF 46%.  He presents for left heart catheterization.       Symptoms:        Angina (Class): II       Ischemic Symptoms: LUO    Heart Failure:        Systolic/Diastolic/Combined:        NYHA Class (within 2 weeks):     Assessment of LVEF:       EF: 40-45%       Assessed by: TTE       Date: 4/16/24    Prior Cardiac Interventions: 12/20/22 EVELYN - 70% stenosis pRCA    Prior EP Procedures: AICD         Noninvasive Testing:   Stress Test: Date: 4/16/24       Protocol: regadenoson       Symptoms: dyspnea       EKG Changes: none       Myocardial Imaging: ate sized, moderate severity inferior fixed defect consistent with infarction; small sized moderate severity, apical completely reversible defect consist with ischemia.  EF 46%. Akinesis in the proximal anteroseptal segment. Hypokinesis in the distal anterior, lateral, inferior, and septal; and proximal and mid septal and anterior segments and apex.       Risk Assessment:     Echo: 4/16/24  EF 40-45 % mild to mod segmental hypokinesis  RV moderately enlarged  trace AI, MR, TR  aortic root mildly dilated, 3.7cm; ascending aorta mildly dilated 3.7 cm      Antianginal Therapies:        Beta Blockers:  carvedilol       Calcium Channel Blockers:        Long Acting Nitrates:        Ranexa:     Associated Risk Factors:        Cerebrovascular Disease: N/A       Chronic Lung Disease: N/A       Peripheral Arterial Disease: N/A       Chronic Kidney Disease (if yes, what is GFR): N/A       Uncontrolled Diabetes (if yes, what is HgbA1C or FBS): N/A       Poorly Controlled Hypertension (if yes, what is SBP): N/A       Morbid Obesity (if yes, what is BMI): N/A       History of Recent Ventricular Arrhythmia: N/A       Inability to Ambulate Safely: N/A       Need for Therapeutic Anticoagulation: N/A       Antiplatelet or Contrast Allergy: N/A             Antiarrhythmic Therapies:      Anticoagulation Therapies:      	  ROS: as stated above, otherwise negative      	    LABS:	 	                                                                         Department of Cardiology                                                                  [facility]/[place] [address]                                                            Telephone: [phone]. Fax:[phone]                                                                                    PST H & P     Chief complaint:    Patient is a 73y old  Male who presents with a chief complaint of abnormal nuclear stress test presents for [facility]    HPI: This is a 73 year old former smoker (quit last week, smoked 50 years, 1/2 ppd), silent MI, CAD, NICM, HFrEF, class II-III NYHA HF, chronic systolic HF, dilated cardiomyopathy, VTACH, dual chamber AICD placed 6/2013, HTN, HLD, BPH, lumbar disc herniations, chronic back pain, ex-lap found with lysis of adhersions and enterotomy with SBO 7/2020, left inguinal hernia repair with mesh 10/2021, open repair of large ventral hernia 4/2021, prostate cancer s/p radical prostatectomy 2008, and urinary incontinence.       Symptoms:        Angina (Class): II       Ischemic Symptoms: LUO    Heart Failure:        Systolic/Diastolic/Combined: combined        NYHA Class (within 2 weeks): II-III    Assessment of LVEF:       EF: 40-45%       Assessed by: TTE       Date: 4/16/24    Prior Cardiac Interventions: 12/20/2022  Procedures:               1.   Arterial Access - Right Radial   2.    Diagnostic Coronary Angiography   3.    Left Heart Cath   4.    IVUS   5.    PCI: EVELYN   Indications: Abnormal stress test   Conclusions:   Near normal LV function.   Significant stenosis of the prozimal RCA; successfully stented. XIENCE SKYPOINT [id number]  Normal Left system   Recommendations:   Aspirin and Plavix   Access : Right radial artery  Coronary Angiography   Left Coronary System: A catheter was positioned into the vessel ostia under fluoroscopic guidance. Angiograms were obtained in multiple views.  Right Coronary System: A catheter was positioned into the vessel ostia under fluoroscopic guidance. Angiograms were obtained in multiple views.  Diagnostic Findings:   Coronary Angiography: The coronary circulation is right dominant.    LM : Left main artery: The segment is visually normal in size and structure.  LAD: Left anterior descending artery: The segment is visually normal in size and structure  Circumflex: Angiography shows mild atherosclerosis.    RCA   Proximal right coronary artery: IVUS shows a 73% by area stenosis. There is a 70 % stenosis. Intravascular ultrasound was performed. Based on the results, the lesion was judged to be significant and an intervention was performed. Mid right coronary artery: There is a 20 % stenosis.    Left Heart Cath   Global left ventricular function is borderline normal. Ejection fraction is 50%.  Valves   Aortic Valve: There is no aortic valve stenosis.    Mitral Valve: The mitral valve exhibits trivial (less than 1+) regurgitation.           Noninvasive Testing:   Stress Test: Date: 4/16/24       Protocol: regadenoson       Symptoms: dyspnea       EKG Changes: none       Myocardial Imaging: ate sized, moderate severity inferior fixed defect consistent with infarction; small sized moderate severity, apical completely reversible defect consist with ischemia.  EF 46%. Akinesis in the proximal anteroseptal segment. Hypokinesis in the distal anterior, lateral, inferior, and septal; and proximal and mid septal and anterior segments and apex.       Risk Assessment:     Echo: 4/16/24  Conclusions:  1. Overall left ventricular systolic function is mild-moderately impaired with, an EF between 40-45%  2. There is mild to moderate segmental hypokinesis of LV  3. The diastolic filling pattern indicates impaired relaxation  4. The right ventricle is moderately enlarged   5. Electronic pacemaker lead seen in the right ventricular cavity  6. Trace amount of aortic regurgitation  7. There is trace mitral regurgitation  8. Trace tricuspid regurgitation present  9. The aortic root is mildly dilated measuring 3.7cm  10. The ascending aorta is mildly dilated measuring 3.7cm  11. The SBE ppx required       Antianginal Therapies:        Beta Blockers:  carvedilol       Calcium Channel Blockers:        Long Acting Nitrates:        Ranexa:     Associated Risk Factors:        Cerebrovascular Disease: N/A       Chronic Lung Disease: N/A       Peripheral Arterial Disease: N/A       Chronic Kidney Disease (if yes, what is GFR): N/A       Uncontrolled Diabetes (if yes, what is HgbA1C or FBS): N/A       Poorly Controlled Hypertension (if yes, what is SBP): N/A       Morbid Obesity (if yes, what is BMI): N/A       History of Recent Ventricular Arrhythmia: N/A       Inability to Ambulate Safely: N/A       Need for Therapeutic Anticoagulation: N/A       Antiplatelet or Contrast Allergy: N/A             Antiarrhythmic Therapies:      Anticoagulation Therapies:      	  ROS: as stated above, otherwise negative      	    LABS:	 	                                                                         Department of Cardiology                                                                  [facility]/[place] [address]                                                            Telephone: [phone]. Fax:[phone]                                                                                    PST H & P     Chief complaint:    Patient is a 73y old  Male who presents with a chief complaint of abnormal nuclear stress test presents for [facility]    HPI: This is a 73 year old former smoker (quit last week, smoked 50 years, 1/2 ppd), silent MI, CAD, NICM, HFrEF, class II-III NYHA HF, chronic systolic HF, dilated cardiomyopathy, VTACH, dual chamber AICD placed 6/2013, HTN, HLD, BPH, lumbar disc herniations, chronic back pain, ex-lap found with lysis of adhesions and enterotomy with SBO 7/2020, left inguinal hernia repair with mesh 10/2021, open repair of large ventral hernia 4/2021, prostate cancer s/p radical prostatectomy 2008, and urinary incontinence. Patient had recent NST which demonstrated       Symptoms:        Angina (Class): II       Ischemic Symptoms: LUO    Heart Failure:        Systolic/Diastolic/Combined: combined        NYHA Class (within 2 weeks): II-III    Assessment of LVEF:       EF: 40-45%       Assessed by: TTE       Date: 4/16/24    Prior Cardiac Interventions: 12/20/2022  Procedures:               1.   Arterial Access - Right Radial   2.    Diagnostic Coronary Angiography   3.    Left Heart Cath   4.    IVUS   5.    PCI: EVLEYN   Indications: Abnormal stress test   Conclusions:   Near normal LV function.   Significant stenosis of the prozimal RCA; successfully stented. XIENCE SKYPOINT [id number]  Normal Left system   Recommendations:   Aspirin and Plavix   Access : Right radial artery  Coronary Angiography   Left Coronary System: A catheter was positioned into the vessel ostia under fluoroscopic guidance. Angiograms were obtained in multiple views.  Right Coronary System: A catheter was positioned into the vessel ostia under fluoroscopic guidance. Angiograms were obtained in multiple views.  Diagnostic Findings:   Coronary Angiography: The coronary circulation is right dominant.    LM : Left main artery: The segment is visually normal in size and structure.  LAD: Left anterior descending artery: The segment is visually normal in size and structure  Circumflex: Angiography shows mild atherosclerosis.    RCA   Proximal right coronary artery: IVUS shows a 73% by area stenosis. There is a 70 % stenosis. Intravascular ultrasound was performed. Based on the results, the lesion was judged to be significant and an intervention was performed. Mid right coronary artery: There is a 20 % stenosis.    Left Heart Cath   Global left ventricular function is borderline normal. Ejection fraction is 50%.  Valves   Aortic Valve: There is no aortic valve stenosis.    Mitral Valve: The mitral valve exhibits trivial (less than 1+) regurgitation.           Noninvasive Testing:   Stress Test: Date: 4/16/24       Protocol: regadenoson       Symptoms: dyspnea       EKG Changes: none       Myocardial Imaging: ate sized, moderate severity inferior fixed defect consistent with infarction; small sized moderate severity, apical completely reversible defect consist with ischemia.  EF 46%. Akinesis in the proximal anteroseptal segment. Hypokinesis in the distal anterior, lateral, inferior, and septal; and proximal and mid septal and anterior segments and apex.       Risk Assessment:     Echo: 4/16/24  Conclusions:  1. Overall left ventricular systolic function is mild-moderately impaired with, an EF between 40-45%  2. There is mild to moderate segmental hypokinesis of LV  3. The diastolic filling pattern indicates impaired relaxation  4. The right ventricle is moderately enlarged   5. Electronic pacemaker lead seen in the right ventricular cavity  6. Trace amount of aortic regurgitation  7. There is trace mitral regurgitation  8. Trace tricuspid regurgitation present  9. The aortic root is mildly dilated measuring 3.7cm  10. The ascending aorta is mildly dilated measuring 3.7cm  11. The SBE ppx required       Antianginal Therapies:        Beta Blockers:  carvedilol       Calcium Channel Blockers:        Long Acting Nitrates:        Ranexa:     Associated Risk Factors:        Cerebrovascular Disease: N/A       Chronic Lung Disease: N/A       Peripheral Arterial Disease: N/A       Chronic Kidney Disease (if yes, what is GFR): N/A       Uncontrolled Diabetes (if yes, what is HgbA1C or FBS): N/A       Poorly Controlled Hypertension (if yes, what is SBP): N/A       Morbid Obesity (if yes, what is BMI): N/A       History of Recent Ventricular Arrhythmia: N/A       Inability to Ambulate Safely: N/A       Need for Therapeutic Anticoagulation: N/A       Antiplatelet or Contrast Allergy: N/A             Antiarrhythmic Therapies:      Anticoagulation Therapies:      	  ROS: as stated above, otherwise negative      	    LABS:	 	                                                                         Department of Cardiology                                                                  [facility]/[place] [address]                                                            Telephone: [phone]. Fax:[phone]                                                                                    PST H & P     Chief complaint:    Patient is a 73y old  Male who presents with a chief complaint of abnormal nuclear stress test presents for [facility]    HPI: This is a 73 year old former smoker (quit last week, smoked 50 years, 1/2 ppd), silent MI, CAD s/p XIENCE SKYPOINT [id number] to pRCA, NICM, HFrEF 40-45%, class II-III NYHA HF, chronic systolic HF, dilated cardiomyopathy, VTACH, dual chamber AICD placed 6/2013, HTN, HLD, BPH, lumbar disc herniations, chronic back pain on hydrocodone, ex-lap found with lysis of adhesions and enterotomy with SBO 7/2020, left inguinal hernia repair with mesh 10/2021, open repair of large ventral hernia 4/2021, prostate cancer s/p radical prostatectomy 2008, and urinary incontinence. Patient had recent NST which demonstrated moderate severity inferior fixed defect consistent with infarction; small sized moderate severity, apical completely reversible defect consist with ischemia.  EF 46%. Akinesis in the proximal anteroseptal segment. Hypokinesis in the distal anterior, lateral, inferior, and septal; and proximal and mid septal and anterior segments and apex. Patient's ECHO demonstrated mild to moderate segmental hypokinesis of LV and the right ventricle was moderately enlarged. Patient currently denies any chest pain, palpitations, SOB, LUO, syncope and swelling in the LE's.         Symptoms:        Angina (Class): II       Ischemic Symptoms: LUO    Heart Failure:        Systolic/Diastolic/Combined: combined        NYHA Class (within 2 weeks): II-III    Assessment of LVEF:       EF: 40-45%       Assessed by: TTE       Date: 4/16/24    Prior Cardiac Interventions: 12/20/2022  Procedures:               1.   Arterial Access - Right Radial   2.    Diagnostic Coronary Angiography   3.    Left Heart Cath   4.    IVUS   5.    PCI: EVELYN   Indications: Abnormal stress test   Conclusions:   Near normal LV function.   Significant stenosis of the prozimal RCA; successfully stented. XIENCE SKYPOINT [id number]  Normal Left system   Recommendations:   Aspirin and Plavix   Access : Right radial artery  Coronary Angiography   Left Coronary System: A catheter was positioned into the vessel ostia under fluoroscopic guidance. Angiograms were obtained in multiple views.  Right Coronary System: A catheter was positioned into the vessel ostia under fluoroscopic guidance. Angiograms were obtained in multiple views.  Diagnostic Findings:   Coronary Angiography: The coronary circulation is right dominant.    LM : Left main artery: The segment is visually normal in size and structure.  LAD: Left anterior descending artery: The segment is visually normal in size and structure  Circumflex: Angiography shows mild atherosclerosis.    RCA   Proximal right coronary artery: IVUS shows a 73% by area stenosis. There is a 70 % stenosis. Intravascular ultrasound was performed. Based on the results, the lesion was judged to be significant and an intervention was performed. Mid right coronary artery: There is a 20 % stenosis.    Left Heart Cath   Global left ventricular function is borderline normal. Ejection fraction is 50%.  Valves   Aortic Valve: There is no aortic valve stenosis.    Mitral Valve: The mitral valve exhibits trivial (less than 1+) regurgitation.           Noninvasive Testing:   Stress Test: Date: 4/16/24       Protocol: regadenoson       Symptoms: dyspnea       EKG Changes: none       Myocardial Imaging: ate sized, moderate severity inferior fixed defect consistent with infarction; small sized moderate severity, apical completely reversible defect consist with ischemia.  EF 46%. Akinesis in the proximal anteroseptal segment. Hypokinesis in the distal anterior, lateral, inferior, and septal; and proximal and mid septal and anterior segments and apex.       Risk Assessment:     Echo: 4/16/24  Conclusions:  1. Overall left ventricular systolic function is mild-moderately impaired with, an EF between 40-45%  2. There is mild to moderate segmental hypokinesis of LV  3. The diastolic filling pattern indicates impaired relaxation  4. The right ventricle is moderately enlarged   5. Electronic pacemaker lead seen in the right ventricular cavity  6. Trace amount of aortic regurgitation  7. There is trace mitral regurgitation  8. Trace tricuspid regurgitation present  9. The aortic root is mildly dilated measuring 3.7cm  10. The ascending aorta is mildly dilated measuring 3.7cm  11. The SBE ppx required       Antianginal Therapies:        Beta Blockers:  carvedilol       Calcium Channel Blockers:        Long Acting Nitrates:        Ranexa:     Associated Risk Factors:        Cerebrovascular Disease: N/A       Chronic Lung Disease: patient with a 50 year pack history       Peripheral Arterial Disease: N/A       Chronic Kidney Disease (if yes, what is GFR): N/A       Uncontrolled Diabetes (if yes, what is HgbA1C or FBS): N/A       Poorly Controlled Hypertension (if yes, what is SBP): N/A       Morbid Obesity (if yes, what is BMI): N/A       History of Recent Ventricular Arrhythmia: N/A       Inability to Ambulate Safely: N/A       Need for Therapeutic Anticoagulation: N/A       Antiplatelet or Contrast Allergy: N/A    REVIEW OF SYSTEMS:  CONSTITUTIONAL:  No weakness, fevers, or chills  EYES/ENT:  No visual changes, vertigo, or throat pain   NECK:  No pain or stiffness  RESPIRATORY:  No SOB, cough, wheezing, hemoptysis  CARDIOVASCULAR:  No chest pain or palpitations  GASTROINTESTINAL: patient endorses wearing depends for incontinence related diarrhea, nausea, vomiting, or hematemesis; No constipation. No melena or hematochezia.  GENITOURINARY:  patient endorses urinary incontinence   NEUROLOGICAL:  No numbness or weakness  SKIN:  No itching or rashes    PHYSICAL EXAM:  GENERAL: NAD, lying in bed comfortably  HEAD:  Atraumatic, normocephalic  EYES: EOMI, PERRLA, conjunctiva and sclera clear  NECK: Supple, trachea midline, no JVD  HEART: Regular rate and rhythm, no murmurs, rubs, or gallops  LUNGS: Unlabored respirations.  Clear to auscultation bilaterally, no crackles, wheezing, or rhonchi  ABDOMEN: Soft, nontender, nondistended, +BS  EXTREMITIES: 2+ peripheral pulses bilaterally. No clubbing, cyanosis, or edema  NERVOUS SYSTEM:  A&Ox3, moving all extremities, no focal deficits   SKIN: No rashes or lesions    LABS:                        12.7   6.52  )-----------( 253      ( 14 May 2024 09:58 )             38.8   05-14    147<H>  |  98  |  13.4  ----------------------------<  101<H>  5.0   |  22.0  |  0.98    Ca    8.8      14 May 2024 09:58  Mg     2.1     05-14    TPro  6.8  /  Alb  3.8  /  TBili  0.2<L>  /  DBili  x   /  AST  15  /  ALT  13  /  AlkPhos  127<H>  05-14

## 2024-05-14 ENCOUNTER — OUTPATIENT (OUTPATIENT)
Dept: OUTPATIENT SERVICES | Facility: HOSPITAL | Age: 74
LOS: 1 days | Discharge: HOME | End: 2024-05-14
Payer: MEDICARE

## 2024-05-14 ENCOUNTER — TRANSCRIPTION ENCOUNTER (OUTPATIENT)
Age: 74
End: 2024-05-14

## 2024-05-14 VITALS
RESPIRATION RATE: 16 BRPM | HEART RATE: 66 BPM | OXYGEN SATURATION: 98 % | DIASTOLIC BLOOD PRESSURE: 74 MMHG | SYSTOLIC BLOOD PRESSURE: 117 MMHG

## 2024-05-14 VITALS
RESPIRATION RATE: 16 BRPM | TEMPERATURE: 98 F | SYSTOLIC BLOOD PRESSURE: 109 MMHG | DIASTOLIC BLOOD PRESSURE: 69 MMHG | OXYGEN SATURATION: 98 % | HEART RATE: 59 BPM

## 2024-05-14 DIAGNOSIS — K56.609 UNSPECIFIED INTESTINAL OBSTRUCTION, UNSPECIFIED AS TO PARTIAL VERSUS COMPLETE OBSTRUCTION: Chronic | ICD-10-CM

## 2024-05-14 DIAGNOSIS — Z98.890 OTHER SPECIFIED POSTPROCEDURAL STATES: Chronic | ICD-10-CM

## 2024-05-14 DIAGNOSIS — Z95.810 PRESENCE OF AUTOMATIC (IMPLANTABLE) CARDIAC DEFIBRILLATOR: Chronic | ICD-10-CM

## 2024-05-14 DIAGNOSIS — K46.9 UNSPECIFIED ABDOMINAL HERNIA WITHOUT OBSTRUCTION OR GANGRENE: Chronic | ICD-10-CM

## 2024-05-14 DIAGNOSIS — R94.39 ABNORMAL RESULT OF OTHER CARDIOVASCULAR FUNCTION STUDY: ICD-10-CM

## 2024-05-14 LAB
ALBUMIN SERPL ELPH-MCNC: 3.8 G/DL — SIGNIFICANT CHANGE UP (ref 3.3–5.2)
ALP SERPL-CCNC: 127 U/L — HIGH (ref 40–120)
ALT FLD-CCNC: 13 U/L — SIGNIFICANT CHANGE UP
ANION GAP SERPL CALC-SCNC: 27 MMOL/L — HIGH (ref 5–17)
AST SERPL-CCNC: 15 U/L — SIGNIFICANT CHANGE UP
BASOPHILS # BLD AUTO: 0.02 K/UL — SIGNIFICANT CHANGE UP (ref 0–0.2)
BASOPHILS NFR BLD AUTO: 0.3 % — SIGNIFICANT CHANGE UP (ref 0–2)
BILIRUB SERPL-MCNC: 0.2 MG/DL — LOW (ref 0.4–2)
BUN SERPL-MCNC: 13.4 MG/DL — SIGNIFICANT CHANGE UP (ref 8–20)
CALCIUM SERPL-MCNC: 8.8 MG/DL — SIGNIFICANT CHANGE UP (ref 8.4–10.5)
CHLORIDE SERPL-SCNC: 98 MMOL/L — SIGNIFICANT CHANGE UP (ref 96–108)
CO2 SERPL-SCNC: 22 MMOL/L — SIGNIFICANT CHANGE UP (ref 22–29)
CREAT SERPL-MCNC: 0.98 MG/DL — SIGNIFICANT CHANGE UP (ref 0.5–1.3)
EGFR: 81 ML/MIN/1.73M2 — SIGNIFICANT CHANGE UP
EOSINOPHIL # BLD AUTO: 0.19 K/UL — SIGNIFICANT CHANGE UP (ref 0–0.5)
EOSINOPHIL NFR BLD AUTO: 2.9 % — SIGNIFICANT CHANGE UP (ref 0–6)
GLUCOSE SERPL-MCNC: 101 MG/DL — HIGH (ref 70–99)
HCT VFR BLD CALC: 38.8 % — LOW (ref 39–50)
HGB BLD-MCNC: 12.7 G/DL — LOW (ref 13–17)
IMM GRANULOCYTES NFR BLD AUTO: 0.3 % — SIGNIFICANT CHANGE UP (ref 0–0.9)
LYMPHOCYTES # BLD AUTO: 1.24 K/UL — SIGNIFICANT CHANGE UP (ref 1–3.3)
LYMPHOCYTES # BLD AUTO: 19 % — SIGNIFICANT CHANGE UP (ref 13–44)
MAGNESIUM SERPL-MCNC: 2.1 MG/DL — SIGNIFICANT CHANGE UP (ref 1.6–2.6)
MCHC RBC-ENTMCNC: 27.3 PG — SIGNIFICANT CHANGE UP (ref 27–34)
MCHC RBC-ENTMCNC: 32.7 GM/DL — SIGNIFICANT CHANGE UP (ref 32–36)
MCV RBC AUTO: 83.3 FL — SIGNIFICANT CHANGE UP (ref 80–100)
MONOCYTES # BLD AUTO: 0.41 K/UL — SIGNIFICANT CHANGE UP (ref 0–0.9)
MONOCYTES NFR BLD AUTO: 6.3 % — SIGNIFICANT CHANGE UP (ref 2–14)
NEUTROPHILS # BLD AUTO: 4.64 K/UL — SIGNIFICANT CHANGE UP (ref 1.8–7.4)
NEUTROPHILS NFR BLD AUTO: 71.2 % — SIGNIFICANT CHANGE UP (ref 43–77)
PLATELET # BLD AUTO: 253 K/UL — SIGNIFICANT CHANGE UP (ref 150–400)
POTASSIUM SERPL-MCNC: 5 MMOL/L — SIGNIFICANT CHANGE UP (ref 3.5–5.3)
POTASSIUM SERPL-SCNC: 5 MMOL/L — SIGNIFICANT CHANGE UP (ref 3.5–5.3)
PROT SERPL-MCNC: 6.8 G/DL — SIGNIFICANT CHANGE UP (ref 6.6–8.7)
RBC # BLD: 4.66 M/UL — SIGNIFICANT CHANGE UP (ref 4.2–5.8)
RBC # FLD: 14.2 % — SIGNIFICANT CHANGE UP (ref 10.3–14.5)
SODIUM SERPL-SCNC: 147 MMOL/L — HIGH (ref 135–145)
WBC # BLD: 6.52 K/UL — SIGNIFICANT CHANGE UP (ref 3.8–10.5)
WBC # FLD AUTO: 6.52 K/UL — SIGNIFICANT CHANGE UP (ref 3.8–10.5)

## 2024-05-14 PROCEDURE — 93005 ELECTROCARDIOGRAM TRACING: CPT

## 2024-05-14 PROCEDURE — C1769: CPT

## 2024-05-14 PROCEDURE — C1894: CPT

## 2024-05-14 PROCEDURE — 92978 ENDOLUMINL IVUS OCT C 1ST: CPT | Mod: LC

## 2024-05-14 PROCEDURE — C1753: CPT

## 2024-05-14 PROCEDURE — 85025 COMPLETE CBC W/AUTO DIFF WBC: CPT

## 2024-05-14 PROCEDURE — 93010 ELECTROCARDIOGRAM REPORT: CPT

## 2024-05-14 PROCEDURE — C1887: CPT

## 2024-05-14 PROCEDURE — C1874: CPT

## 2024-05-14 PROCEDURE — C9600: CPT | Mod: LC

## 2024-05-14 PROCEDURE — 93458 L HRT ARTERY/VENTRICLE ANGIO: CPT | Mod: 59

## 2024-05-14 PROCEDURE — 36415 COLL VENOUS BLD VENIPUNCTURE: CPT

## 2024-05-14 PROCEDURE — 80053 COMPREHEN METABOLIC PANEL: CPT

## 2024-05-14 PROCEDURE — C1725: CPT

## 2024-05-14 PROCEDURE — 83735 ASSAY OF MAGNESIUM: CPT

## 2024-05-14 RX ORDER — ASPIRIN/CALCIUM CARB/MAGNESIUM 324 MG
1 TABLET ORAL
Refills: 0 | DISCHARGE

## 2024-05-14 RX ORDER — SODIUM CHLORIDE 9 MG/ML
250 INJECTION INTRAMUSCULAR; INTRAVENOUS; SUBCUTANEOUS ONCE
Refills: 0 | Status: DISCONTINUED | OUTPATIENT
Start: 2024-05-14 | End: 2024-05-28

## 2024-05-14 RX ORDER — ZOLPIDEM TARTRATE 10 MG/1
1 TABLET ORAL
Refills: 0 | DISCHARGE

## 2024-05-14 RX ORDER — HYDROCORTISONE 20 MG
1 TABLET ORAL
Refills: 0 | DISCHARGE

## 2024-05-14 RX ORDER — LANOLIN ALCOHOL/MO/W.PET/CERES
1 CREAM (GRAM) TOPICAL
Refills: 0 | DISCHARGE

## 2024-05-14 RX ORDER — CARVEDILOL PHOSPHATE 80 MG/1
1 CAPSULE, EXTENDED RELEASE ORAL
Qty: 0 | Refills: 0 | DISCHARGE

## 2024-05-14 RX ORDER — CLOPIDOGREL BISULFATE 75 MG/1
1 TABLET, FILM COATED ORAL
Qty: 90 | Refills: 3
Start: 2024-05-14

## 2024-05-14 NOTE — PROGRESS NOTE ADULT - SUBJECTIVE AND OBJECTIVE BOX
Now s/p LHC via RRA with radial band in place. Procedure performed by Dr. Edwards.  Pt arrived to recovery in NAD and HDS. RRA  access site stable, no bleed/hematoma, distal pulse +2.    Procedure results:  ARELIS FRONTIER 2.95w18AF to pCirc    Post procedure EKG: sinus bradycardia 59bpm    Medication received during procedure:  Versed: 1mg  Lidocaine: 10ml  Nitroglycerin: 200mcg  Fentanyl: 50mcg  Verapamil: 5mg  Heparin: 10,000 units  Plavix: 300mg  Omnipaque: 133ml    Exam:   Neuro: A&O X4  CV: RRR  Lungs: CTA  Ext: + palp pulses.  Vascular access: RRA.  Site stable. No bleeding/hematoma/ecchymosis.  + cap refill     Plan:  -Formal cath report pending  -Post procedure management/monitoring per protocol  -Access site precautions  -Radial compression band removal at 13:45, patient may be OOB at 14:45  -Repeat ECG if any clinical indication or change on tele  -NS 250mL bolus post cath ordered   -Continue current medical therapy  -Dual anti platelet therapy with aspirin 81mg po daily/plavix 75mg po daily  -Cont BB with Coreg 12.5mg BID  -Cont statin therapy with Lipitor 40mg po qHS   -Cont with RAASi with Enalapril 10mg po daily  -Educated regarding strict adherence with DAPT   -Educated regarding post procedure management and care  -Discussed the importance of RF modification  -Cardiac rehab info provided/referral and communication to cardiac rehab completed  -F/U outpt in 1-2 weeks with Cardiologist Dr. Jha  -DISPO: Plan for D/C this afternoon if remains HDS, no bleeding, hematoma, cyanosis from RLE     Now s/p LHC via RRA with radial band in place. Procedure performed by Dr. Edwards.  Pt arrived to recovery in NAD and HDS. RRA  access site stable, no bleed/hematoma, distal pulse +2.    Procedure results:  ARELIS FRONTIER 2.67g95NK to LCX  Procedures:                 1.    Arterial Access - Right Radial   2.    Diagnostic Coronary Angiography   3.    Aortogram   4.    PCI: EVELYN   5.    IVUS   Conclusions:   LVEF 45%   Normal Left Main   Minor disease of the LAD   80% stenosis of the proximal LCX; verified by IVUS and stented   Patent stent in the proximal RCA   Recommendations:   Aspirin and Plavix   The coronary circulation is right dominant.    LM: The segment is visually normal in size and  structure.    LAD   Mid left anterior descending: Angiography shows minor irregularities.      CX   Proximal circumflex: There is an 80 % stenosis. Intravascular  ultrasound was performed. Imaging shows minimal cross  sectional area of 2.0 mm2. Based on the results, the lesion was judged  to be significant and an intervention was performed.    RCA   Proximal right coronary artery: The previously placed stent is a  drug-eluting stent and is patent.    Left Heart Cath   Left ventricular function was assessed. Global left ventricular  function is mildly depressed. Ejection fraction was calculated with  a value of 45%. LV to AO pullback was performed and there is no  pressure gradient.  Valves   Aortic Valve: There is no aortic valve stenosis.    Mitral Valve: The mitral valve exhibits mild regurgitation.        Post procedure EKG: sinus bradycardia 59bpm    Medication received during procedure:  Versed: 1mg  Lidocaine: 10ml  Nitroglycerin: 200mcg  Fentanyl: 50mcg  Verapamil: 5mg  Heparin: 10,000 units  Plavix: 300mg  Omnipaque: 133ml    Exam:   Neuro: A&O X4  CV: RRR  Lungs: CTA  Ext: + palp pulses.  Vascular access: RRA.  Site stable. No bleeding/hematoma/ecchymosis.  + cap refill     Plan:  -Formal cath report pending  -Post procedure management/monitoring per protocol  -Access site precautions  -Radial compression band removal at 13:45, patient may be OOB at 14:45  -Repeat ECG if any clinical indication or change on tele  -NS 250mL bolus post cath ordered   -Continue current medical therapy  -Dual anti platelet therapy with aspirin 81mg po daily/plavix 75mg po daily  -Cont BB with Coreg 12.5mg BID  -Cont statin therapy with Lipitor 40mg po qHS   -Cont with RAASi with Enalapril 10mg po daily  -Educated regarding strict adherence with DAPT   -Educated regarding post procedure management and care  -Discussed the importance of RF modification  -Cardiac rehab info provided/referral and communication to cardiac rehab completed  -F/U outpt in 1-2 weeks with Cardiologist Dr. Jha  -DISPO: Plan for D/C this afternoon if remains HDS, no bleeding, hematoma, cyanosis from RLE     Now s/p LHC via RRA with radial band in place. Procedure performed by Dr. Edwards.  Pt arrived to recovery in NAD and HDS. RRA  access site stable, no bleed/hematoma, distal pulse +2.    Procedure results:  ARELIS FRONTIER 2.67j45GK to LCX  Procedures:                 1.    Arterial Access - Right Radial   2.    Diagnostic Coronary Angiography   3.    Aortogram   4.    PCI: EVELYN   5.    IVUS   Conclusions:   LVEF 45%   Normal Left Main   Minor disease of the LAD   80% stenosis of the proximal LCX; verified by IVUS and stented   Patent stent in the proximal RCA   Recommendations:   Aspirin and Plavix   The coronary circulation is right dominant.    LM: The segment is visually normal in size and structure.  LAD: Mid left anterior descending: Angiography shows minor irregularities.  CX: Proximal circumflex: There is an 80 % stenosis. Intravascular ultrasound was performed. Imaging shows minimal cross sectional area of 2.0 mm2. Based on the results, the lesion was judged to be significant and an intervention was performed.  RCA: Proximal right coronary artery: The previously placed stent is a drug-eluting stent and is patent.  Left Heart Cath   Left ventricular function was assessed. Global left ventricular function is mildly depressed. Ejection fraction was calculated with a value of 45%. LV to AO pullback was performed and there is no pressure gradient.  Valves   Aortic Valve: There is no aortic valve stenosis.    Mitral Valve: The mitral valve exhibits mild regurgitation.        Post procedure EKG: sinus bradycardia 59bpm    Medication received during procedure:  Versed: 1mg  Lidocaine: 10ml  Nitroglycerin: 200mcg  Fentanyl: 50mcg  Verapamil: 5mg  Heparin: 10,000 units  Plavix: 300mg  Omnipaque: 133ml    Exam:   Neuro: A&O X4  CV: RRR  Lungs: CTA  Ext: + palp pulses.  Vascular access: RRA.  Site stable. No bleeding/hematoma/ecchymosis.  + cap refill     Plan:  -Formal cath report pending  -Post procedure management/monitoring per protocol  -Access site precautions  -Radial compression band removal at 13:45, patient may be OOB at 14:45  -Repeat ECG if any clinical indication or change on tele  -NS 250mL bolus post cath ordered   -Continue current medical therapy  -Dual anti platelet therapy with aspirin 81mg po daily/plavix 75mg po daily  -Cont BB with Coreg 12.5mg BID  -Cont statin therapy with Lipitor 40mg po qHS   -Cont with RAASi with Enalapril 10mg po daily  -Educated regarding strict adherence with DAPT   -Educated regarding post procedure management and care  -Discussed the importance of RF modification  -Cardiac rehab info provided/referral and communication to cardiac rehab completed  -F/U outpt in 1-2 weeks with Cardiologist Dr. Jha  -DISPO: Plan for D/C this afternoon if remains HDS, no bleeding, hematoma, cyanosis from RLE

## 2024-05-14 NOTE — DISCHARGE NOTE PROVIDER - NSDCMRMEDTOKEN_GEN_ALL_CORE_FT
Ambien 10 mg oral tablet: 1 tab(s) orally once a day (at bedtime)  Aspirin EC 81 mg oral delayed release tablet: 1 tab(s) orally once a day  atorvastatin 40 mg oral tablet: 1 tab(s) orally once a day  carvedilol 12.5 mg oral tablet: 1 tab(s) orally 2 times a day  chlorzoxazone 500 mg oral tablet: 1 tab(s) orally 2 times a day  clopidogrel 75 mg oral tablet: 1 tab(s) orally once a day  enalapril 10 mg oral tablet: 1 tab(s) orally 2 times a day  hydrocortisone 20 mg oral tablet: 1 tab(s) orally once a day  magnesium oxide 400 mg (240 mg elemental magnesium) oral tablet: 1 tab(s) orally once a day  melatonin 3 mg oral tablet, disintegratin tab(s) orally once a day (at bedtime)  tamsulosin 0.4 mg oral capsule: 1 cap(s) orally once a day  zolpidem 10 mg oral tablet: 1 tab(s) orally once a day (at bedtime)   Aspirin EC 81 mg oral delayed release tablet: 1 tab(s) orally once a day  atorvastatin 40 mg oral tablet: 1 tab(s) orally once a day  carvedilol 12.5 mg oral tablet: 1 tab(s) orally 2 times a day  chlorzoxazone 500 mg oral tablet: 1 tab(s) orally 2 times a day  clopidogrel 75 mg oral tablet: 1 tab(s) orally once a day  enalapril 10 mg oral tablet: 1 tab(s) orally 2 times a day  hydrocortisone 20 mg oral tablet: 1 tab(s) orally once a day  magnesium oxide 400 mg (240 mg elemental magnesium) oral tablet: 1 tab(s) orally once a day  melatonin 3 mg oral tablet, disintegratin tab(s) orally once a day (at bedtime)  tamsulosin 0.4 mg oral capsule: 1 cap(s) orally once a day  zolpidem 10 mg oral tablet: 1 tab(s) orally once a day (at bedtime)

## 2024-05-14 NOTE — DISCHARGE NOTE NURSING/CASE MANAGEMENT/SOCIAL WORK - PATIENT PORTAL LINK FT
You can access the FollowMyHealth Patient Portal offered by [facility] by registering at the following website: [email or web address]. By joining [employer]’s FollowMyHealth portal, you will also be able to view your health information using other applications (apps) compatible with our system.

## 2024-05-14 NOTE — DISCHARGE NOTE PROVIDER - NSDCCPCAREPLAN_GEN_ALL_CORE_FT
PRINCIPAL DISCHARGE DIAGNOSIS  Diagnosis: CAD (coronary artery disease)  Assessment and Plan of Treatment: Coronary artery disease is the buildup of plaque in the arteries that supply oxygen-rich blood to your heart. Plaque causes a narrowing or blockage that could result in a heart attack. Symptoms include chest pain or discomfort, shortness of breath, dizziness, palpitations, fatigue or reduced exercise tolerance. .  Go to the ED with any acute onset of chest pain, palpitations, shortness of breath or dizziness. Do NOT miss a dose or stop taking your Aspirin and Plavix, these medications keep your stent open and prevent a heart attack. If anyone tells you to stop these medications, speak to your cardiologist immediately.  Managing risk factors will help keep your stent open and prevent future blockages, risk factors may include: high blood pressure, high cholesterol, diabetes, obesity, sedentary life style and smoking.    Your diet should be low in fat, cholesterol, salt and carbohydrates, increase fruits (caution if diabetic), vegetables and whole grains/fiber rich foods.   Take all your cardiac  medications as prescribed.    Exercise is a very important factor in heart health. Once your post procedure restrictions have passed, you should engage in heart healthy, aerobic exercise. Be sure to have clearance from your cardiologist. Cardiac rehab programs could be extremely beneficial and your cardiologist could help set this up.   Follow up with your cardiologist within 1-2 weeks after your procedure.   Call your cardiologist or our unit ([phone]) with any questions or concerns that may arise.

## 2024-05-14 NOTE — DISCHARGE NOTE NURSING/CASE MANAGEMENT/SOCIAL WORK - NSDCPEFALRISK_GEN_ALL_CORE
For information on Fall & Injury Prevention, visit: [email or web address] OR  [email or web address] OR  https://www.cdc.gov/steadi/patient.html

## 2024-05-14 NOTE — DISCHARGE NOTE PROVIDER - HOSPITAL COURSE
Now s/p LHC via RRA with radial band in place. Procedure performed by Dr. Edwards.  Pt arrived to recovery in NAD and HDS. RRA  access site stable, no bleed/hematoma, distal pulse +2.    Procedure results:  ARELIS FRONTIER 2.38t37RJ to pCirc      Plan:  -Formal cath report pending  -Post procedure management/monitoring per protocol  -Access site precautions  -Continue current medical therapy  -Dual anti platelet therapy with aspirin 81mg po daily/plavix 75mg po daily  -Cont BB with Coreg 12.5mg BID  -Cont statin therapy with Lipitor 40mg po qHS   -Cont with RAASi with Enalapril 10mg po daily  -Educated regarding strict adherence with DAPT   -Educated regarding post procedure management and care  -Discussed the importance of RF modification  -Cardiac rehab info provided/referral and communication to cardiac rehab completed  -F/U outpt in 1-2 weeks with Cardiologist Dr. Jha

## 2024-05-14 NOTE — ASU PATIENT PROFILE, ADULT - FALL HARM RISK - UNIVERSAL INTERVENTIONS
Bed in lowest position, wheels locked, appropriate side rails in place/Call bell, personal items and telephone in reach/Instruct patient to call for assistance before getting out of bed or chair/Non-slip footwear when patient is out of bed/[place] to call system/Physically safe environment - no spills, clutter or unnecessary equipment/Purposeful Proactive Rounding/Room/bathroom lighting operational, light cord in reach

## 2024-05-14 NOTE — DISCHARGE NOTE PROVIDER - CARE PROVIDER_API CALL
Libertad Jha  Cardiovascular Disease  [address] [place] [id number]  Phone: [phone]  Fax: [phone]  Established Patient  Follow Up Time: 1 week

## 2024-08-13 NOTE — PROGRESS NOTE ADULT - SUBJECTIVE AND OBJECTIVE BOX
Detail Level: Detailed Detail Level: Generalized INTERVAL HPI/OVERNIGHT EVENTS:    Patient seen and evaluated at bedside and found hemodynamically stable and in no acute distress. No acute events overnight. Pain is well controlled. Clamp trial yesterday with 500cc residual and placed back to low suction. Patient continues to be NPO/IVF, passing gas and having more regular  bowel movements. Denies fevers, chills, chest pain, SOB, coughing, dizziness, n/v/d, or generalized malaise.     SUBJECTIVE:      MEDICATIONS  (STANDING):  atorvastatin 40 milliGRAM(s) Oral at bedtime  carvedilol 3.125 milliGRAM(s) Oral every 12 hours  chlorhexidine 4% Liquid 1 Application(s) Topical <User Schedule>  dextrose 5% + sodium chloride 0.45% 1000 milliLiter(s) (75 mL/Hr) IV Continuous <Continuous>  heparin   Injectable 5000 Unit(s) SubCutaneous every 8 hours  HYDROmorphone PCA (1 mG/mL) 30 milliLiter(s) PCA Continuous PCA Continuous  metoprolol tartrate Injectable 5 milliGRAM(s) IV Push every 6 hours  nicotine -  14 mG/24Hr(s) Patch 1 patch Transdermal daily    MEDICATIONS  (PRN):  naloxone Injectable 0.1 milliGRAM(s) IV Push every 3 minutes PRN For ANY of the following changes in patient status:  A. RR LESS THAN 10 breaths per minute, B. Oxygen saturation LESS THAN 90%, C. Sedation score of 6  ondansetron Injectable 4 milliGRAM(s) IV Push every 6 hours PRN Nausea      Vital Signs Last 24 Hrs  T(C): 37.2 (02 Aug 2020 23:23), Max: 37.2 (02 Aug 2020 23:23)  T(F): 98.9 (02 Aug 2020 23:23), Max: 98.9 (02 Aug 2020 23:23)  HR: 94 (02 Aug 2020 23:23) (85 - 790)  BP: 106/73 (02 Aug 2020 23:23) (102/68 - 123/69)  BP(mean): --  RR: 17 (02 Aug 2020 23:23) (17 - 20)  SpO2: 91% (02 Aug 2020 23:23) (91% - 97%)    PHYSICAL EXAM:      General: lying in bed in no acute distress  HEENT: NGT in place with high output bilious. Neck supple  Chest: non-labored breathing or conversational dyspnea   Abdomen: non-distended, incision sites well healed, no surrounding erythema nor purulent drainage, soft and depressible, non-tender. No guarding or rebound, non-peritonitic.  Ext: no edema or cyanosis      I&O's Detail    01 Aug 2020 07:01  -  02 Aug 2020 07:00  --------------------------------------------------------  IN:    dextrose 5% + sodium chloride 0.45%: 975 mL  Total IN: 975 mL    OUT:    Indwelling Catheter - Urethral: 1100 mL    Nasoenteral Tube: 1850 mL  Total OUT: 2950 mL    Total NET: -1975 mL      02 Aug 2020 07:01  -  03 Aug 2020 02:09  --------------------------------------------------------  IN:    dextrose 5% + sodium chloride 0.45%: 1275 mL    Lactated Ringers IV Bolus: 620 mL  Total IN: 1895 mL    OUT:    Indwelling Catheter - Urethral: 950 mL    Nasoenteral Tube: 1240 mL  Total OUT: 2190 mL    Total NET: -295 mL          LABS:    08-02    143  |  107  |  15.0  ----------------------------<  119<H>  4.2   |  24.0  |  0.96    Ca    8.1<L>      02 Aug 2020 05:02  Phos  2.6     08-02  Mg     2.2     08-02    TPro  x   /  Alb  2.8<L>  /  TBili  x   /  DBili  x   /  AST  x   /  ALT  x   /  AlkPhos  x   08-01 Detail Level: Simple

## 2024-09-23 NOTE — ASU PATIENT PROFILE, ADULT - CENTRAL VENOUS CATHETER
Venipuncture Blood Specimen Collection  Venipuncture performed in left arm by Kat Carolina MA with good hemostasis. Patient tolerated the procedure well without complications.   09/23/24   Kat Carolina MA   no

## 2025-04-05 ENCOUNTER — NON-APPOINTMENT (OUTPATIENT)
Age: 75
End: 2025-04-05

## 2025-04-07 ENCOUNTER — APPOINTMENT (OUTPATIENT)
Dept: ELECTROPHYSIOLOGY | Facility: CLINIC | Age: 75
End: 2025-04-07
Payer: MEDICARE

## 2025-04-07 VITALS
HEART RATE: 66 BPM | SYSTOLIC BLOOD PRESSURE: 98 MMHG | WEIGHT: 216 LBS | DIASTOLIC BLOOD PRESSURE: 64 MMHG | HEIGHT: 75 IN | BODY MASS INDEX: 26.86 KG/M2 | OXYGEN SATURATION: 96 %

## 2025-04-07 VITALS — DIASTOLIC BLOOD PRESSURE: 64 MMHG | SYSTOLIC BLOOD PRESSURE: 96 MMHG

## 2025-04-07 DIAGNOSIS — I47.20 VENTRICULAR TACHYCARDIA, UNSPECIFIED: ICD-10-CM

## 2025-04-07 PROCEDURE — 93000 ELECTROCARDIOGRAM COMPLETE: CPT | Mod: 59

## 2025-04-07 PROCEDURE — 99204 OFFICE O/P NEW MOD 45 MIN: CPT

## 2025-04-07 PROCEDURE — 93289 INTERROG DEVICE EVAL HEART: CPT

## 2025-04-16 RX ORDER — CHLORZOXAZONE 500 MG/1
500 TABLET ORAL DAILY
Refills: 0 | Status: ACTIVE | COMMUNITY

## 2025-04-16 RX ORDER — AMIODARONE HYDROCHLORIDE 200 MG/1
200 TABLET ORAL DAILY
Refills: 0 | Status: ACTIVE | COMMUNITY

## 2025-04-16 RX ORDER — ASPIRIN 81 MG/1
81 TABLET ORAL
Refills: 0 | Status: ACTIVE | COMMUNITY

## 2025-04-16 RX ORDER — CLOPIDOGREL 75 MG/1
75 TABLET, FILM COATED ORAL DAILY
Refills: 0 | Status: ACTIVE | COMMUNITY

## 2025-05-05 ENCOUNTER — APPOINTMENT (OUTPATIENT)
Dept: OTOLARYNGOLOGY | Facility: CLINIC | Age: 75
End: 2025-05-05
Payer: MEDICARE

## 2025-05-05 VITALS
WEIGHT: 214 LBS | HEART RATE: 67 BPM | DIASTOLIC BLOOD PRESSURE: 72 MMHG | SYSTOLIC BLOOD PRESSURE: 111 MMHG | HEIGHT: 75 IN | BODY MASS INDEX: 26.61 KG/M2

## 2025-05-05 DIAGNOSIS — H61.23 IMPACTED CERUMEN, BILATERAL: ICD-10-CM

## 2025-05-05 DIAGNOSIS — H91.93 UNSPECIFIED HEARING LOSS, BILATERAL: ICD-10-CM

## 2025-05-05 PROCEDURE — 69210 REMOVE IMPACTED EAR WAX UNI: CPT

## 2025-05-05 PROCEDURE — 99202 OFFICE O/P NEW SF 15 MIN: CPT | Mod: 25

## 2025-07-11 ENCOUNTER — NON-APPOINTMENT (OUTPATIENT)
Age: 75
End: 2025-07-11

## 2025-07-11 ENCOUNTER — APPOINTMENT (OUTPATIENT)
Dept: ELECTROPHYSIOLOGY | Facility: CLINIC | Age: 75
End: 2025-07-11

## 2025-07-11 PROCEDURE — 93296 REM INTERROG EVL PM/IDS: CPT

## 2025-07-11 PROCEDURE — 93295 DEV INTERROG REMOTE 1/2/MLT: CPT

## 2025-07-15 ENCOUNTER — APPOINTMENT (OUTPATIENT)
Dept: CARDIOLOGY | Facility: CLINIC | Age: 75
End: 2025-07-15

## 2025-07-15 VITALS
OXYGEN SATURATION: 95 % | BODY MASS INDEX: 26.86 KG/M2 | SYSTOLIC BLOOD PRESSURE: 118 MMHG | HEIGHT: 75 IN | WEIGHT: 216 LBS | DIASTOLIC BLOOD PRESSURE: 62 MMHG | HEART RATE: 74 BPM

## 2025-09-05 ENCOUNTER — APPOINTMENT (OUTPATIENT)
Dept: ORTHOPEDIC SURGERY | Facility: CLINIC | Age: 75
End: 2025-09-05
Payer: MEDICARE

## 2025-09-05 VITALS
DIASTOLIC BLOOD PRESSURE: 79 MMHG | BODY MASS INDEX: 26.73 KG/M2 | SYSTOLIC BLOOD PRESSURE: 124 MMHG | WEIGHT: 215 LBS | HEIGHT: 75 IN | HEART RATE: 68 BPM

## 2025-09-05 DIAGNOSIS — M54.50 LOW BACK PAIN, UNSPECIFIED: ICD-10-CM

## 2025-09-05 DIAGNOSIS — M67.952 UNSPECIFIED DISORDER OF SYNOVIUM AND TENDON, LEFT THIGH: ICD-10-CM

## 2025-09-05 DIAGNOSIS — G89.29 LOW BACK PAIN, UNSPECIFIED: ICD-10-CM

## 2025-09-05 DIAGNOSIS — M47.816 SPONDYLOSIS W/OUT MYELOPATHY OR RADICULOPATHY, LUMBAR REGION: ICD-10-CM

## 2025-09-05 PROCEDURE — 99203 OFFICE O/P NEW LOW 30 MIN: CPT

## 2025-09-17 ENCOUNTER — APPOINTMENT (OUTPATIENT)
Dept: PHYSICAL MEDICINE AND REHAB | Facility: CLINIC | Age: 75
End: 2025-09-17
Payer: MEDICARE

## 2025-09-17 VITALS
SYSTOLIC BLOOD PRESSURE: 121 MMHG | HEART RATE: 64 BPM | HEIGHT: 75 IN | WEIGHT: 217 LBS | BODY MASS INDEX: 26.98 KG/M2 | DIASTOLIC BLOOD PRESSURE: 75 MMHG | RESPIRATION RATE: 14 BRPM

## 2025-09-17 DIAGNOSIS — M67.952 UNSPECIFIED DISORDER OF SYNOVIUM AND TENDON, LEFT THIGH: ICD-10-CM

## 2025-09-17 DIAGNOSIS — M54.50 LOW BACK PAIN, UNSPECIFIED: ICD-10-CM

## 2025-09-17 DIAGNOSIS — G89.29 LOW BACK PAIN, UNSPECIFIED: ICD-10-CM

## 2025-09-17 DIAGNOSIS — M47.816 SPONDYLOSIS W/OUT MYELOPATHY OR RADICULOPATHY, LUMBAR REGION: ICD-10-CM

## 2025-09-17 DIAGNOSIS — Z86.39 PERSONAL HISTORY OF OTHER ENDOCRINE, NUTRITIONAL AND METABOLIC DISEASE: ICD-10-CM

## 2025-09-17 DIAGNOSIS — M79.18 MYALGIA, OTHER SITE: ICD-10-CM

## 2025-09-17 PROCEDURE — 99204 OFFICE O/P NEW MOD 45 MIN: CPT

## 2025-09-17 RX ORDER — GABAPENTIN 100 MG/1
100 CAPSULE ORAL
Qty: 60 | Refills: 0 | Status: ACTIVE | COMMUNITY
Start: 2025-09-17 | End: 1900-01-01